# Patient Record
Sex: MALE | Race: BLACK OR AFRICAN AMERICAN | Employment: OTHER | URBAN - NONMETROPOLITAN AREA
[De-identification: names, ages, dates, MRNs, and addresses within clinical notes are randomized per-mention and may not be internally consistent; named-entity substitution may affect disease eponyms.]

---

## 2018-09-13 ENCOUNTER — HOSPITAL ENCOUNTER (OUTPATIENT)
Dept: CT IMAGING | Age: 63
Discharge: HOME | End: 2018-09-13
Payer: MEDICARE

## 2018-09-13 DIAGNOSIS — J44.9 CHRONIC OBSTRUCTIVE PULMONARY DISEASE, UNSPECIFIED COPD TYPE (HCC): ICD-10-CM

## 2018-09-13 DIAGNOSIS — R93.89 ABNORMAL CXR: ICD-10-CM

## 2018-09-13 LAB — POC CREATININE WHOLE BLOOD: 1 MG/DL (ref 0.5–1.2)

## 2018-09-13 PROCEDURE — 6360000004 HC RX CONTRAST MEDICATION: Performed by: NURSE PRACTITIONER

## 2018-09-13 PROCEDURE — 82565 ASSAY OF CREATININE: CPT

## 2018-09-13 PROCEDURE — 71260 CT THORAX DX C+: CPT

## 2018-09-13 RX ADMIN — IOPAMIDOL 85 ML: 755 INJECTION, SOLUTION INTRAVENOUS at 10:29

## 2018-12-12 ENCOUNTER — HOSPITAL ENCOUNTER (OUTPATIENT)
Dept: PULMONOLOGY | Age: 63
Discharge: HOME | End: 2018-12-12
Payer: MEDICARE

## 2018-12-12 PROCEDURE — 94729 DIFFUSING CAPACITY: CPT

## 2018-12-12 PROCEDURE — 94010 BREATHING CAPACITY TEST: CPT

## 2018-12-12 PROCEDURE — 94726 PLETHYSMOGRAPHY LUNG VOLUMES: CPT

## 2018-12-26 ENCOUNTER — HOSPITAL ENCOUNTER (OUTPATIENT)
Age: 63
Setting detail: SPECIMEN
Discharge: HOME | End: 2018-12-26
Payer: MEDICARE

## 2018-12-26 LAB — TESTOSTERONE TOTAL: 730 NG/DL (ref 220–1000)

## 2019-01-01 ENCOUNTER — TELEPHONE (OUTPATIENT)
Dept: PULMONOLOGY | Age: 64
End: 2019-01-01

## 2019-01-01 DIAGNOSIS — G47.33 OSA (OBSTRUCTIVE SLEEP APNEA): Primary | ICD-10-CM

## 2019-01-10 RX ORDER — BACLOFEN 10 MG/1
10 TABLET ORAL DAILY
COMMUNITY

## 2019-01-10 RX ORDER — PREGABALIN 75 MG/1
75 CAPSULE ORAL 2 TIMES DAILY
COMMUNITY

## 2019-01-10 RX ORDER — PIOGLITAZONEHYDROCHLORIDE 30 MG/1
30 TABLET ORAL DAILY
Status: ON HOLD | COMMUNITY
End: 2020-01-01 | Stop reason: HOSPADM

## 2019-01-10 RX ORDER — METOPROLOL TARTRATE 100 MG/1
100 TABLET ORAL 2 TIMES DAILY
Status: ON HOLD | COMMUNITY
End: 2020-01-01 | Stop reason: HOSPADM

## 2019-01-10 RX ORDER — MODAFINIL 100 MG/1
100 TABLET ORAL DAILY
COMMUNITY

## 2019-01-10 RX ORDER — BUSPIRONE HYDROCHLORIDE 5 MG/1
5 TABLET ORAL 3 TIMES DAILY
COMMUNITY
End: 2020-01-01 | Stop reason: ALTCHOICE

## 2019-01-10 RX ORDER — DUTASTERIDE 0.5 MG/1
0.5 CAPSULE, LIQUID FILLED ORAL DAILY
COMMUNITY

## 2019-01-10 RX ORDER — LOSARTAN POTASSIUM 50 MG/1
50 TABLET ORAL DAILY
Status: ON HOLD | COMMUNITY
End: 2020-01-01 | Stop reason: ALTCHOICE

## 2019-01-10 RX ORDER — HOMATROPINE HYDROBROMIDE OPHTHALMIC 50 MG/ML
1 SOLUTION OPHTHALMIC
COMMUNITY

## 2019-01-10 RX ORDER — ASPIRIN 81 MG/1
81 TABLET ORAL DAILY
COMMUNITY

## 2019-01-10 RX ORDER — ZOLPIDEM TARTRATE 10 MG/1
TABLET ORAL NIGHTLY PRN
COMMUNITY

## 2019-01-10 RX ORDER — SULFAMETHOXAZOLE AND TRIMETHOPRIM 800; 160 MG/1; MG/1
1 TABLET ORAL 2 TIMES DAILY
COMMUNITY
End: 2019-01-11

## 2019-01-10 RX ORDER — FLUTICASONE PROPIONATE 110 UG/1
1 AEROSOL, METERED RESPIRATORY (INHALATION) 2 TIMES DAILY
COMMUNITY
End: 2019-01-11

## 2019-01-10 RX ORDER — ALBUTEROL SULFATE 90 UG/1
2 AEROSOL, METERED RESPIRATORY (INHALATION) EVERY 6 HOURS PRN
COMMUNITY

## 2019-01-10 RX ORDER — METFORMIN HYDROCHLORIDE 500 MG/1
500 TABLET, EXTENDED RELEASE ORAL 2 TIMES DAILY
Status: ON HOLD | COMMUNITY
End: 2020-01-01 | Stop reason: HOSPADM

## 2019-01-10 RX ORDER — OMEPRAZOLE 20 MG/1
20 CAPSULE, DELAYED RELEASE ORAL EVERY OTHER DAY
COMMUNITY

## 2019-01-11 ENCOUNTER — OFFICE VISIT (OUTPATIENT)
Dept: PULMONOLOGY | Age: 64
End: 2019-01-11
Payer: MEDICARE

## 2019-01-11 VITALS
SYSTOLIC BLOOD PRESSURE: 136 MMHG | DIASTOLIC BLOOD PRESSURE: 76 MMHG | HEART RATE: 80 BPM | BODY MASS INDEX: 30.06 KG/M2 | OXYGEN SATURATION: 99 % | TEMPERATURE: 97.7 F | HEIGHT: 70 IN | WEIGHT: 210 LBS

## 2019-01-11 DIAGNOSIS — Z77.9 HISTORY OF INHALATIONAL EXPOSURE TO TOXIN: ICD-10-CM

## 2019-01-11 DIAGNOSIS — G47.33 OSA (OBSTRUCTIVE SLEEP APNEA): ICD-10-CM

## 2019-01-11 DIAGNOSIS — J98.4 MIXED RESTRICTIVE AND OBSTRUCTIVE LUNG DISEASE (HCC): Primary | ICD-10-CM

## 2019-01-11 DIAGNOSIS — J43.9 MIXED RESTRICTIVE AND OBSTRUCTIVE LUNG DISEASE (HCC): Primary | ICD-10-CM

## 2019-01-11 DIAGNOSIS — R94.2 DECREASED DIFFUSION CAPACITY: ICD-10-CM

## 2019-01-11 DIAGNOSIS — R06.02 SOB (SHORTNESS OF BREATH): ICD-10-CM

## 2019-01-11 PROCEDURE — G8484 FLU IMMUNIZE NO ADMIN: HCPCS | Performed by: INTERNAL MEDICINE

## 2019-01-11 PROCEDURE — 1036F TOBACCO NON-USER: CPT | Performed by: INTERNAL MEDICINE

## 2019-01-11 PROCEDURE — 3023F SPIROM DOC REV: CPT | Performed by: INTERNAL MEDICINE

## 2019-01-11 PROCEDURE — 94618 PULMONARY STRESS TESTING: CPT | Performed by: INTERNAL MEDICINE

## 2019-01-11 PROCEDURE — 3017F COLORECTAL CA SCREEN DOC REV: CPT | Performed by: INTERNAL MEDICINE

## 2019-01-11 PROCEDURE — G8926 SPIRO NO PERF OR DOC: HCPCS | Performed by: INTERNAL MEDICINE

## 2019-01-11 PROCEDURE — G8427 DOCREV CUR MEDS BY ELIG CLIN: HCPCS | Performed by: INTERNAL MEDICINE

## 2019-01-11 PROCEDURE — G8417 CALC BMI ABV UP PARAM F/U: HCPCS | Performed by: INTERNAL MEDICINE

## 2019-01-11 PROCEDURE — 99204 OFFICE O/P NEW MOD 45 MIN: CPT | Performed by: INTERNAL MEDICINE

## 2019-01-11 RX ORDER — FLUTICASONE PROPIONATE 220 UG/1
2 AEROSOL, METERED RESPIRATORY (INHALATION) 2 TIMES DAILY
Qty: 1 INHALER | Refills: 3 | Status: SHIPPED | OUTPATIENT
Start: 2019-01-11 | End: 2019-03-05 | Stop reason: ALTCHOICE

## 2019-01-11 ASSESSMENT — ENCOUNTER SYMPTOMS
GASTROINTESTINAL NEGATIVE: 1
WHEEZING: 1
SHORTNESS OF BREATH: 1
COUGH: 1

## 2019-01-17 ENCOUNTER — HOSPITAL ENCOUNTER (OUTPATIENT)
Dept: NON INVASIVE DIAGNOSTICS | Age: 64
Discharge: HOME | End: 2019-01-17
Payer: MEDICARE

## 2019-01-17 LAB
LV EF: 33 %
LVEF MODALITY: NORMAL

## 2019-01-17 PROCEDURE — 93306 TTE W/DOPPLER COMPLETE: CPT

## 2019-03-05 ENCOUNTER — TELEPHONE (OUTPATIENT)
Dept: PULMONOLOGY | Age: 64
End: 2019-03-05

## 2019-03-05 ENCOUNTER — INITIAL CONSULT (OUTPATIENT)
Dept: PULMONOLOGY | Age: 64
End: 2019-03-05
Payer: MEDICARE

## 2019-03-05 VITALS
BODY MASS INDEX: 29.84 KG/M2 | WEIGHT: 208.4 LBS | SYSTOLIC BLOOD PRESSURE: 112 MMHG | HEART RATE: 74 BPM | DIASTOLIC BLOOD PRESSURE: 64 MMHG | OXYGEN SATURATION: 99 % | HEIGHT: 70 IN

## 2019-03-05 DIAGNOSIS — J44.1 COPD EXACERBATION (HCC): Primary | ICD-10-CM

## 2019-03-05 DIAGNOSIS — G47.33 OSA (OBSTRUCTIVE SLEEP APNEA): ICD-10-CM

## 2019-03-05 PROCEDURE — 3023F SPIROM DOC REV: CPT | Performed by: INTERNAL MEDICINE

## 2019-03-05 PROCEDURE — 3017F COLORECTAL CA SCREEN DOC REV: CPT | Performed by: INTERNAL MEDICINE

## 2019-03-05 PROCEDURE — G8417 CALC BMI ABV UP PARAM F/U: HCPCS | Performed by: INTERNAL MEDICINE

## 2019-03-05 PROCEDURE — 99214 OFFICE O/P EST MOD 30 MIN: CPT | Performed by: INTERNAL MEDICINE

## 2019-03-05 PROCEDURE — G8926 SPIRO NO PERF OR DOC: HCPCS | Performed by: INTERNAL MEDICINE

## 2019-03-05 PROCEDURE — 1036F TOBACCO NON-USER: CPT | Performed by: INTERNAL MEDICINE

## 2019-03-05 PROCEDURE — G8484 FLU IMMUNIZE NO ADMIN: HCPCS | Performed by: INTERNAL MEDICINE

## 2019-03-05 PROCEDURE — G8427 DOCREV CUR MEDS BY ELIG CLIN: HCPCS | Performed by: INTERNAL MEDICINE

## 2019-03-05 RX ORDER — PREDNISONE 20 MG/1
40 TABLET ORAL DAILY
Qty: 12 TABLET | Refills: 0 | Status: SHIPPED | OUTPATIENT
Start: 2019-03-05 | End: 2019-03-11

## 2019-03-05 RX ORDER — GUAIFENESIN AND DEXTROMETHORPHAN HYDROBROMIDE 100; 10 MG/5ML; MG/5ML
5 SOLUTION ORAL EVERY 6 HOURS PRN
COMMUNITY
End: 2020-01-01 | Stop reason: ALTCHOICE

## 2019-03-19 ENCOUNTER — HOSPITAL ENCOUNTER (OUTPATIENT)
Age: 64
Setting detail: SPECIMEN
Discharge: HOME | End: 2019-03-19
Payer: MEDICARE

## 2019-03-19 LAB
SEX HORMONE BINDING GLOBULIN: 21 NMOL/L (ref 11–80)
TESTOSTERONE FREE-NONMALE: 120.9 PG/ML (ref 47–244)
TESTOSTERONE TOTAL: 462 NG/DL (ref 220–1000)

## 2019-03-21 ENCOUNTER — OFFICE VISIT (OUTPATIENT)
Dept: PULMONOLOGY | Age: 64
End: 2019-03-21
Payer: MEDICARE

## 2019-03-21 VITALS
OXYGEN SATURATION: 99 % | BODY MASS INDEX: 29.95 KG/M2 | DIASTOLIC BLOOD PRESSURE: 80 MMHG | HEIGHT: 70 IN | HEART RATE: 85 BPM | RESPIRATION RATE: 18 BRPM | WEIGHT: 209.2 LBS | SYSTOLIC BLOOD PRESSURE: 110 MMHG

## 2019-03-21 DIAGNOSIS — G47.33 OSA ON CPAP: ICD-10-CM

## 2019-03-21 DIAGNOSIS — J44.9 CHRONIC OBSTRUCTIVE PULMONARY DISEASE, UNSPECIFIED COPD TYPE (HCC): Primary | ICD-10-CM

## 2019-03-21 DIAGNOSIS — Z99.89 OSA ON CPAP: ICD-10-CM

## 2019-03-21 PROCEDURE — 3023F SPIROM DOC REV: CPT | Performed by: INTERNAL MEDICINE

## 2019-03-21 PROCEDURE — G8417 CALC BMI ABV UP PARAM F/U: HCPCS | Performed by: INTERNAL MEDICINE

## 2019-03-21 PROCEDURE — 99213 OFFICE O/P EST LOW 20 MIN: CPT | Performed by: INTERNAL MEDICINE

## 2019-03-21 PROCEDURE — 3017F COLORECTAL CA SCREEN DOC REV: CPT | Performed by: INTERNAL MEDICINE

## 2019-03-21 PROCEDURE — 1036F TOBACCO NON-USER: CPT | Performed by: INTERNAL MEDICINE

## 2019-03-21 PROCEDURE — G8427 DOCREV CUR MEDS BY ELIG CLIN: HCPCS | Performed by: INTERNAL MEDICINE

## 2019-03-21 PROCEDURE — G8926 SPIRO NO PERF OR DOC: HCPCS | Performed by: INTERNAL MEDICINE

## 2019-03-21 PROCEDURE — G8484 FLU IMMUNIZE NO ADMIN: HCPCS | Performed by: INTERNAL MEDICINE

## 2019-03-21 ASSESSMENT — ENCOUNTER SYMPTOMS
SHORTNESS OF BREATH: 1
COUGH: 1
WHEEZING: 1
GASTROINTESTINAL NEGATIVE: 1

## 2019-05-20 ENCOUNTER — HOSPITAL ENCOUNTER (OUTPATIENT)
Age: 64
Setting detail: SPECIMEN
Discharge: HOME | End: 2019-05-20
Payer: MEDICAID

## 2019-05-20 LAB
-: ABNORMAL
AMORPHOUS: ABNORMAL
BACTERIA: ABNORMAL
BILIRUBIN URINE: NEGATIVE
CASTS UA: ABNORMAL /LPF (ref 0–8)
COLOR: YELLOW
COMMENT UA: ABNORMAL
CRYSTALS, UA: ABNORMAL /HPF
EPITHELIAL CELLS UA: ABNORMAL /HPF (ref 0–5)
GLUCOSE URINE: ABNORMAL
KETONES, URINE: NEGATIVE
LEUKOCYTE ESTERASE, URINE: ABNORMAL
MUCUS: ABNORMAL
NITRITE, URINE: NEGATIVE
OTHER OBSERVATIONS UA: ABNORMAL
PH UA: 6.5 (ref 5–8)
PROTEIN UA: NEGATIVE
RBC UA: ABNORMAL /HPF (ref 0–4)
RENAL EPITHELIAL, UA: ABNORMAL /HPF
SPECIFIC GRAVITY UA: 1.02 (ref 1–1.03)
TRICHOMONAS: ABNORMAL
TURBIDITY: ABNORMAL
URINE HGB: ABNORMAL
UROBILINOGEN, URINE: NORMAL
WBC UA: ABNORMAL /HPF (ref 0–5)
YEAST: ABNORMAL

## 2019-05-22 LAB
CULTURE: ABNORMAL
Lab: ABNORMAL
SPECIMEN DESCRIPTION: ABNORMAL

## 2019-05-23 LAB
CULTURE: ABNORMAL
Lab: ABNORMAL
SPECIMEN DESCRIPTION: ABNORMAL

## 2019-06-05 ENCOUNTER — HOSPITAL ENCOUNTER (OUTPATIENT)
Age: 64
Setting detail: SPECIMEN
Discharge: HOME | End: 2019-06-05
Payer: MEDICARE

## 2019-06-05 LAB
ABSOLUTE EOS #: 0.13 K/UL (ref 0–0.44)
ABSOLUTE IMMATURE GRANULOCYTE: <0.03 K/UL (ref 0–0.3)
ABSOLUTE LYMPH #: 1.7 K/UL (ref 1.1–3.7)
ABSOLUTE MONO #: 0.34 K/UL (ref 0.1–1.2)
ALBUMIN SERPL-MCNC: 4.7 G/DL (ref 3.5–5.2)
ALBUMIN/GLOBULIN RATIO: 1.6 (ref 1–2.5)
ALP BLD-CCNC: 125 U/L (ref 40–129)
ALT SERPL-CCNC: 28 U/L (ref 5–41)
ANION GAP SERPL CALCULATED.3IONS-SCNC: 13 MMOL/L (ref 9–17)
AST SERPL-CCNC: 16 U/L
BASOPHILS # BLD: 1 % (ref 0–2)
BASOPHILS ABSOLUTE: 0.07 K/UL (ref 0–0.2)
BILIRUB SERPL-MCNC: 0.6 MG/DL (ref 0.3–1.2)
BUN BLDV-MCNC: 16 MG/DL (ref 8–23)
BUN/CREAT BLD: ABNORMAL (ref 9–20)
CALCIUM SERPL-MCNC: 9.9 MG/DL (ref 8.6–10.4)
CHLORIDE BLD-SCNC: 104 MMOL/L (ref 98–107)
CO2: 25 MMOL/L (ref 20–31)
CREAT SERPL-MCNC: 1.08 MG/DL (ref 0.7–1.2)
DIFFERENTIAL TYPE: ABNORMAL
EOSINOPHILS RELATIVE PERCENT: 2 % (ref 1–4)
GFR AFRICAN AMERICAN: >60 ML/MIN
GFR NON-AFRICAN AMERICAN: >60 ML/MIN
GFR SERPL CREATININE-BSD FRML MDRD: ABNORMAL ML/MIN/{1.73_M2}
GFR SERPL CREATININE-BSD FRML MDRD: ABNORMAL ML/MIN/{1.73_M2}
GLUCOSE BLD-MCNC: 122 MG/DL (ref 70–99)
HAV IGM SER IA-ACNC: NONREACTIVE
HCT VFR BLD CALC: 47.5 % (ref 40.7–50.3)
HEMOGLOBIN: 13.8 G/DL (ref 13–17)
HEPATITIS B CORE IGM ANTIBODY: NONREACTIVE
HEPATITIS B SURFACE ANTIGEN: NONREACTIVE
HEPATITIS C ANTIBODY: NONREACTIVE
HIV AG/AB: NONREACTIVE
IMMATURE GRANULOCYTES: 0 %
LYMPHOCYTES # BLD: 28 % (ref 24–43)
MCH RBC QN AUTO: 23 PG (ref 25.2–33.5)
MCHC RBC AUTO-ENTMCNC: 29.1 G/DL (ref 28.4–34.8)
MCV RBC AUTO: 79.2 FL (ref 82.6–102.9)
MONOCYTES # BLD: 6 % (ref 3–12)
NRBC AUTOMATED: 0 PER 100 WBC
PDW BLD-RTO: 19.7 % (ref 11.8–14.4)
PLATELET # BLD: ABNORMAL K/UL (ref 138–453)
PLATELET ESTIMATE: ABNORMAL
PLATELET, FLUORESCENCE: 183 K/UL (ref 138–453)
PLATELET, IMMATURE FRACTION: 17.8 % (ref 1.1–10.3)
PMV BLD AUTO: ABNORMAL FL (ref 8.1–13.5)
POTASSIUM SERPL-SCNC: 4.6 MMOL/L (ref 3.7–5.3)
RBC # BLD: 6 M/UL (ref 4.21–5.77)
RBC # BLD: ABNORMAL 10*6/UL
SEG NEUTROPHILS: 63 % (ref 36–65)
SEGMENTED NEUTROPHILS ABSOLUTE COUNT: 3.79 K/UL (ref 1.5–8.1)
SODIUM BLD-SCNC: 142 MMOL/L (ref 135–144)
TOTAL PROTEIN: 7.7 G/DL (ref 6.4–8.3)
WBC # BLD: 6.1 K/UL (ref 3.5–11.3)
WBC # BLD: ABNORMAL 10*3/UL

## 2019-07-11 ENCOUNTER — HOSPITAL ENCOUNTER (OUTPATIENT)
Age: 64
Setting detail: SPECIMEN
Discharge: HOME | End: 2019-07-11
Payer: MEDICARE

## 2019-07-12 ENCOUNTER — HOSPITAL ENCOUNTER (OUTPATIENT)
Age: 64
Setting detail: SPECIMEN
Discharge: HOME | End: 2019-07-12
Payer: MEDICARE

## 2019-07-12 LAB
ABSOLUTE EOS #: 0.23 K/UL (ref 0–0.44)
ABSOLUTE IMMATURE GRANULOCYTE: 0.09 K/UL (ref 0–0.3)
ABSOLUTE LYMPH #: 1.55 K/UL (ref 1.1–3.7)
ABSOLUTE MONO #: 0.61 K/UL (ref 0.1–1.2)
ALBUMIN SERPL-MCNC: 4.4 G/DL (ref 3.5–5.2)
ALBUMIN/GLOBULIN RATIO: 1.5 (ref 1–2.5)
ALP BLD-CCNC: 136 U/L (ref 40–129)
ALT SERPL-CCNC: 23 U/L (ref 5–41)
ANION GAP SERPL CALCULATED.3IONS-SCNC: 13 MMOL/L (ref 9–17)
AST SERPL-CCNC: 19 U/L
BASOPHILS # BLD: 1 % (ref 0–2)
BASOPHILS ABSOLUTE: 0.11 K/UL (ref 0–0.2)
BILIRUB SERPL-MCNC: 0.54 MG/DL (ref 0.3–1.2)
BUN BLDV-MCNC: 10 MG/DL (ref 8–23)
BUN/CREAT BLD: ABNORMAL (ref 9–20)
C. TRACHOMATIS DNA ,URINE: NEGATIVE
CALCIUM SERPL-MCNC: 9.4 MG/DL (ref 8.6–10.4)
CHLORIDE BLD-SCNC: 97 MMOL/L (ref 98–107)
CO2: 26 MMOL/L (ref 20–31)
CREAT SERPL-MCNC: 1.08 MG/DL (ref 0.7–1.2)
DIFFERENTIAL TYPE: ABNORMAL
EOSINOPHILS RELATIVE PERCENT: 2 % (ref 1–4)
FOLATE: >20 NG/ML
GFR AFRICAN AMERICAN: >60 ML/MIN
GFR NON-AFRICAN AMERICAN: >60 ML/MIN
GFR SERPL CREATININE-BSD FRML MDRD: ABNORMAL ML/MIN/{1.73_M2}
GFR SERPL CREATININE-BSD FRML MDRD: ABNORMAL ML/MIN/{1.73_M2}
GLUCOSE BLD-MCNC: 315 MG/DL (ref 70–99)
HCT VFR BLD CALC: 47 % (ref 40.7–50.3)
HEMOGLOBIN: 13.5 G/DL (ref 13–17)
IMMATURE GRANULOCYTES: 1 %
LYMPHOCYTES # BLD: 17 % (ref 24–43)
MAGNESIUM: 2.1 MG/DL (ref 1.6–2.6)
MCH RBC QN AUTO: 23.2 PG (ref 25.2–33.5)
MCHC RBC AUTO-ENTMCNC: 28.7 G/DL (ref 28.4–34.8)
MCV RBC AUTO: 80.8 FL (ref 82.6–102.9)
MONOCYTES # BLD: 7 % (ref 3–12)
N. GONORRHOEAE DNA, URINE: NEGATIVE
NRBC AUTOMATED: 0.2 PER 100 WBC
PDW BLD-RTO: 19.5 % (ref 11.8–14.4)
PLATELET # BLD: ABNORMAL K/UL (ref 138–453)
PLATELET ESTIMATE: ABNORMAL
PLATELET, FLUORESCENCE: 179 K/UL (ref 138–453)
PLATELET, IMMATURE FRACTION: NORMAL % (ref 1.1–10.3)
PMV BLD AUTO: ABNORMAL FL (ref 8.1–13.5)
POTASSIUM SERPL-SCNC: 4.6 MMOL/L (ref 3.7–5.3)
RBC # BLD: 5.82 M/UL (ref 4.21–5.77)
RBC # BLD: ABNORMAL 10*6/UL
SEG NEUTROPHILS: 72 % (ref 36–65)
SEGMENTED NEUTROPHILS ABSOLUTE COUNT: 6.82 K/UL (ref 1.5–8.1)
SODIUM BLD-SCNC: 136 MMOL/L (ref 135–144)
SOURCE: NORMAL
SPECIMEN DESCRIPTION: NORMAL
TESTOSTERONE TOTAL: 928 NG/DL (ref 220–1000)
TOTAL PROTEIN: 7.3 G/DL (ref 6.4–8.3)
TRICHOMONAS VAGINALI, MOLECULAR: NEGATIVE
TSH SERPL DL<=0.05 MIU/L-ACNC: 2.04 MIU/L (ref 0.3–5)
VITAMIN B-12: 551 PG/ML (ref 232–1245)
VITAMIN D 25-HYDROXY: 24.3 NG/ML (ref 30–100)
WBC # BLD: 9.4 K/UL (ref 3.5–11.3)
WBC # BLD: ABNORMAL 10*3/UL

## 2019-07-17 ENCOUNTER — HOSPITAL ENCOUNTER (OUTPATIENT)
Age: 64
Setting detail: SPECIMEN
Discharge: HOME | End: 2019-07-17
Payer: MEDICARE

## 2019-07-17 LAB
-: ABNORMAL
AMORPHOUS: ABNORMAL
BACTERIA: ABNORMAL
BILIRUBIN URINE: NEGATIVE
CASTS UA: ABNORMAL /LPF (ref 0–8)
COLOR: YELLOW
COMMENT UA: ABNORMAL
CRYSTALS, UA: ABNORMAL /HPF
EPITHELIAL CELLS UA: ABNORMAL /HPF (ref 0–5)
GLUCOSE URINE: ABNORMAL
KETONES, URINE: NEGATIVE
LEUKOCYTE ESTERASE, URINE: ABNORMAL
MUCUS: ABNORMAL
NITRITE, URINE: NEGATIVE
OTHER OBSERVATIONS UA: ABNORMAL
PH UA: 5.5 (ref 5–8)
PROTEIN UA: NEGATIVE
RBC UA: ABNORMAL /HPF (ref 0–4)
RENAL EPITHELIAL, UA: ABNORMAL /HPF
SPECIFIC GRAVITY UA: 1.01 (ref 1–1.03)
TRICHOMONAS: ABNORMAL
TURBIDITY: ABNORMAL
URINE HGB: ABNORMAL
UROBILINOGEN, URINE: NORMAL
WBC UA: ABNORMAL /HPF (ref 0–5)
YEAST: ABNORMAL

## 2019-07-19 LAB
CULTURE: ABNORMAL
Lab: ABNORMAL
SPECIMEN DESCRIPTION: ABNORMAL

## 2019-07-22 ENCOUNTER — HOSPITAL ENCOUNTER (OUTPATIENT)
Age: 64
Setting detail: SPECIMEN
Discharge: HOME | End: 2019-07-22
Payer: MEDICARE

## 2019-07-22 LAB
ABSOLUTE RETIC #: 0.17 M/UL (ref 0.03–0.08)
ALBUMIN SERPL-MCNC: 4.7 G/DL (ref 3.5–5.2)
ALBUMIN/GLOBULIN RATIO: 1.6 (ref 1–2.5)
ALP BLD-CCNC: 129 U/L (ref 40–129)
ALT SERPL-CCNC: 25 U/L (ref 5–41)
ANION GAP SERPL CALCULATED.3IONS-SCNC: 14 MMOL/L (ref 9–17)
AST SERPL-CCNC: 23 U/L
BILIRUB SERPL-MCNC: 0.58 MG/DL (ref 0.3–1.2)
BUN BLDV-MCNC: 11 MG/DL (ref 8–23)
BUN/CREAT BLD: ABNORMAL (ref 9–20)
CALCIUM SERPL-MCNC: 9.8 MG/DL (ref 8.6–10.4)
CHLORIDE BLD-SCNC: 99 MMOL/L (ref 98–107)
CO2: 25 MMOL/L (ref 20–31)
CREAT SERPL-MCNC: 1.18 MG/DL (ref 0.7–1.2)
FERRITIN: 29 UG/L (ref 30–400)
GFR AFRICAN AMERICAN: >60 ML/MIN
GFR NON-AFRICAN AMERICAN: >60 ML/MIN
GFR SERPL CREATININE-BSD FRML MDRD: ABNORMAL ML/MIN/{1.73_M2}
GFR SERPL CREATININE-BSD FRML MDRD: ABNORMAL ML/MIN/{1.73_M2}
GLUCOSE BLD-MCNC: 280 MG/DL (ref 70–99)
IMMATURE RETIC FRACT: 29.5 % (ref 2.7–18.3)
IRON SATURATION: 11 % (ref 20–55)
IRON: 50 UG/DL (ref 59–158)
POTASSIUM SERPL-SCNC: 4.7 MMOL/L (ref 3.7–5.3)
RETIC %: 2.9 % (ref 0.5–1.9)
RETIC HEMOGLOBIN: 23 PG (ref 28.2–35.7)
SODIUM BLD-SCNC: 138 MMOL/L (ref 135–144)
TOTAL IRON BINDING CAPACITY: 438 UG/DL (ref 250–450)
TOTAL PROTEIN: 7.7 G/DL (ref 6.4–8.3)
TSH SERPL DL<=0.05 MIU/L-ACNC: 1.43 MIU/L (ref 0.3–5)
UNSATURATED IRON BINDING CAPACITY: 388 UG/DL (ref 112–347)

## 2019-08-02 ENCOUNTER — HOSPITAL ENCOUNTER (OUTPATIENT)
Age: 64
Setting detail: SPECIMEN
Discharge: HOME | End: 2019-08-02
Payer: MEDICARE

## 2019-08-02 LAB
HCT VFR BLD CALC: 48.2 % (ref 40.7–50.3)
HEMOGLOBIN: 13.7 G/DL (ref 13–17)
MCH RBC QN AUTO: 22.6 PG (ref 25.2–33.5)
MCHC RBC AUTO-ENTMCNC: 28.4 G/DL (ref 28.4–34.8)
MCV RBC AUTO: 79.4 FL (ref 82.6–102.9)
NRBC AUTOMATED: 0 PER 100 WBC
PDW BLD-RTO: 19.1 % (ref 11.8–14.4)
PLATELET # BLD: ABNORMAL K/UL (ref 138–453)
PLATELET, FLUORESCENCE: 127 K/UL (ref 138–453)
PLATELET, IMMATURE FRACTION: 16.2 % (ref 1.1–10.3)
PMV BLD AUTO: ABNORMAL FL (ref 8.1–13.5)
PROSTATE SPECIFIC ANTIGEN: 0.45 UG/L
RBC # BLD: 6.07 M/UL (ref 4.21–5.77)
SEX HORMONE BINDING GLOBULIN: 21 NMOL/L (ref 11–80)
TESTOSTERONE FREE-NONMALE: 290.7 PG/ML (ref 47–244)
TESTOSTERONE TOTAL: 985 NG/DL (ref 220–1000)
WBC # BLD: 7.4 K/UL (ref 3.5–11.3)

## 2019-09-06 ENCOUNTER — HOSPITAL ENCOUNTER (OUTPATIENT)
Age: 64
Setting detail: SPECIMEN
Discharge: HOME | End: 2019-09-06
Payer: MEDICARE

## 2019-09-06 LAB
ABSOLUTE EOS #: 0.14 K/UL (ref 0–0.44)
ABSOLUTE IMMATURE GRANULOCYTE: 0.03 K/UL (ref 0–0.3)
ABSOLUTE LYMPH #: 1.65 K/UL (ref 1.1–3.7)
ABSOLUTE MONO #: 0.54 K/UL (ref 0.1–1.2)
BASOPHILS # BLD: 1 % (ref 0–2)
BASOPHILS ABSOLUTE: 0.09 K/UL (ref 0–0.2)
DIFFERENTIAL TYPE: ABNORMAL
EOSINOPHILS RELATIVE PERCENT: 2 % (ref 1–4)
FERRITIN: 95 UG/L (ref 30–400)
HCT VFR BLD CALC: 53.5 % (ref 40.7–50.3)
HEMOGLOBIN: 15.4 G/DL (ref 13–17)
IMMATURE GRANULOCYTES: 0 %
IRON SATURATION: 18 % (ref 20–55)
IRON: 75 UG/DL (ref 59–158)
LYMPHOCYTES # BLD: 24 % (ref 24–43)
MCH RBC QN AUTO: 23.3 PG (ref 25.2–33.5)
MCHC RBC AUTO-ENTMCNC: 28.8 G/DL (ref 28.4–34.8)
MCV RBC AUTO: 81.1 FL (ref 82.6–102.9)
MONOCYTES # BLD: 8 % (ref 3–12)
NRBC AUTOMATED: 0 PER 100 WBC
PDW BLD-RTO: 19.5 % (ref 11.8–14.4)
PLATELET # BLD: ABNORMAL K/UL (ref 138–453)
PLATELET ESTIMATE: ABNORMAL
PLATELET, FLUORESCENCE: 149 K/UL (ref 138–453)
PLATELET, IMMATURE FRACTION: 19.4 % (ref 1.1–10.3)
PMV BLD AUTO: ABNORMAL FL (ref 8.1–13.5)
RBC # BLD: 6.6 M/UL (ref 4.21–5.77)
RBC # BLD: ABNORMAL 10*6/UL
SEG NEUTROPHILS: 64 % (ref 36–65)
SEGMENTED NEUTROPHILS ABSOLUTE COUNT: 4.4 K/UL (ref 1.5–8.1)
TOTAL IRON BINDING CAPACITY: 416 UG/DL (ref 250–450)
UNSATURATED IRON BINDING CAPACITY: 341 UG/DL (ref 112–347)
WBC # BLD: 6.9 K/UL (ref 3.5–11.3)
WBC # BLD: ABNORMAL 10*3/UL

## 2019-09-09 ENCOUNTER — HOSPITAL ENCOUNTER (OUTPATIENT)
Age: 64
Setting detail: SPECIMEN
Discharge: HOME | End: 2019-09-09
Payer: MEDICARE

## 2019-09-10 LAB
HGB ELECTROPHORESIS INTERP: NORMAL
PATHOLOGIST: NORMAL

## 2019-09-26 ENCOUNTER — OFFICE VISIT (OUTPATIENT)
Dept: PULMONOLOGY | Age: 64
End: 2019-09-26
Payer: MEDICARE

## 2019-09-26 ENCOUNTER — HOSPITAL ENCOUNTER (OUTPATIENT)
Dept: INTERVENTIONAL RADIOLOGY/VASCULAR | Age: 64
Discharge: HOME | End: 2019-09-26
Payer: MEDICARE

## 2019-09-26 VITALS
OXYGEN SATURATION: 98 % | DIASTOLIC BLOOD PRESSURE: 84 MMHG | WEIGHT: 226.8 LBS | TEMPERATURE: 97.7 F | HEIGHT: 70 IN | BODY MASS INDEX: 32.47 KG/M2 | SYSTOLIC BLOOD PRESSURE: 136 MMHG | HEART RATE: 82 BPM

## 2019-09-26 DIAGNOSIS — G47.33 OSA ON CPAP: ICD-10-CM

## 2019-09-26 DIAGNOSIS — Z99.89 OSA ON CPAP: ICD-10-CM

## 2019-09-26 DIAGNOSIS — R42 DIZZINESS AND GIDDINESS: ICD-10-CM

## 2019-09-26 DIAGNOSIS — J43.9 MIXED RESTRICTIVE AND OBSTRUCTIVE LUNG DISEASE (HCC): Primary | ICD-10-CM

## 2019-09-26 DIAGNOSIS — J98.4 MIXED RESTRICTIVE AND OBSTRUCTIVE LUNG DISEASE (HCC): Primary | ICD-10-CM

## 2019-09-26 PROCEDURE — 1036F TOBACCO NON-USER: CPT | Performed by: NURSE PRACTITIONER

## 2019-09-26 PROCEDURE — 93880 EXTRACRANIAL BILAT STUDY: CPT

## 2019-09-26 PROCEDURE — G8427 DOCREV CUR MEDS BY ELIG CLIN: HCPCS | Performed by: NURSE PRACTITIONER

## 2019-09-26 PROCEDURE — 3017F COLORECTAL CA SCREEN DOC REV: CPT | Performed by: NURSE PRACTITIONER

## 2019-09-26 PROCEDURE — 99213 OFFICE O/P EST LOW 20 MIN: CPT | Performed by: NURSE PRACTITIONER

## 2019-09-26 PROCEDURE — 4040F PNEUMOC VAC/ADMIN/RCVD: CPT | Performed by: NURSE PRACTITIONER

## 2019-09-26 PROCEDURE — G8926 SPIRO NO PERF OR DOC: HCPCS | Performed by: NURSE PRACTITIONER

## 2019-09-26 PROCEDURE — 3023F SPIROM DOC REV: CPT | Performed by: NURSE PRACTITIONER

## 2019-09-26 PROCEDURE — G8417 CALC BMI ABV UP PARAM F/U: HCPCS | Performed by: NURSE PRACTITIONER

## 2019-09-26 PROCEDURE — 1123F ACP DISCUSS/DSCN MKR DOCD: CPT | Performed by: NURSE PRACTITIONER

## 2019-09-26 ASSESSMENT — ENCOUNTER SYMPTOMS
BACK PAIN: 0
STRIDOR: 0
NAUSEA: 0
WHEEZING: 0
COUGH: 0
DIARRHEA: 0
SHORTNESS OF BREATH: 1
CHEST TIGHTNESS: 0
ALLERGIC/IMMUNOLOGIC NEGATIVE: 1
EYES NEGATIVE: 1

## 2019-09-26 NOTE — PROGRESS NOTES
tablet Take 10 mg by mouth 3 times daily      dutasteride (AVODART) 0.5 MG capsule Take 0.5 mg by mouth daily      Handicap Placard MISC by Does not apply route      homatropine 5 % ophthalmic solution 1 drop every hour       losartan (COZAAR) 50 MG tablet Take 50 mg by mouth daily      pregabalin (LYRICA) 75 MG capsule Take 75 mg by mouth 2 times daily. [place]is [place] metFORMIN (GLUCOPHAGE-XR) 500 MG extended release tablet Take 500 mg by mouth daily (with breakfast)      metoprolol (LOPRESSOR) 100 MG tablet Take 100 mg by mouth 2 times daily      modafinil (PROVIGIL) 100 MG tablet Take 100 mg by mouth daily. [place]is [place] omeprazole (PRILOSEC) 20 MG delayed release capsule Take 20 mg by mouth 4 times daily      pioglitazone (ACTOS) 30 MG tablet Take 30 mg by mouth daily      Respiratory Therapy Supplies (NEBULIZER/ADULT MASK) KIT by Does not apply route      Testosterone (TESTOPEL) 75 MG PLLT by Implant route. [place]is [place] insulin glargine (TOUJEO SOLOSTAR) 300 UNIT/ML injection pen Inject into the skin nightly      blood glucose test strips (TRUE METRIX BLOOD GLUCOSE TEST) strip 1 each by In Vitro route daily As needed.  albuterol sulfate  (90 Base) MCG/ACT inhaler Inhale 2 puffs into the lungs every 6 hours as needed for Wheezing      Liraglutide (VICTOZA) 18 MG/3ML SOPN SC injection Inject 1.2 mg into the skin daily      Dextromethorphan-guaiFENesin  MG/5ML SYRP Take 5 mLs by mouth every 6 hours as needed for Cough      zolpidem (AMBIEN) 10 MG tablet Take by mouth nightly as needed for Sleep. [place]is [place] aspirin 81 MG EC tablet Take 81 mg by mouth daily      busPIRone (BUSPAR) 5 MG tablet Take 5 mg by mouth 3 times daily        No current facility-administered medications for this visit. Demetrius GOMES   Review of Systems   Constitutional: Negative for activity change, appetite change, chills and fever. HENT: Negative for congestion and postnasal drip. Eyes: Negative.     Respiratory: Positive for

## 2019-10-17 ENCOUNTER — HOSPITAL ENCOUNTER (OUTPATIENT)
Age: 64
Setting detail: SPECIMEN
Discharge: HOME | End: 2019-10-17
Payer: MEDICARE

## 2019-10-17 LAB
ABSOLUTE EOS #: 0.12 K/UL (ref 0–0.44)
ABSOLUTE IMMATURE GRANULOCYTE: 0.08 K/UL (ref 0–0.3)
ABSOLUTE LYMPH #: 1.66 K/UL (ref 1.1–3.7)
ABSOLUTE MONO #: 0.55 K/UL (ref 0.1–1.2)
ALBUMIN SERPL-MCNC: 4.4 G/DL (ref 3.5–5.2)
ALBUMIN/GLOBULIN RATIO: 1.5 (ref 1–2.5)
ALP BLD-CCNC: 125 U/L (ref 40–129)
ALT SERPL-CCNC: 34 U/L (ref 5–41)
ANION GAP SERPL CALCULATED.3IONS-SCNC: 13 MMOL/L (ref 9–17)
AST SERPL-CCNC: 30 U/L
BASOPHILS # BLD: 1 % (ref 0–2)
BASOPHILS ABSOLUTE: 0.07 K/UL (ref 0–0.2)
BILIRUB SERPL-MCNC: 1.07 MG/DL (ref 0.3–1.2)
BUN BLDV-MCNC: 11 MG/DL (ref 8–23)
BUN/CREAT BLD: ABNORMAL (ref 9–20)
CALCIUM SERPL-MCNC: 9.4 MG/DL (ref 8.6–10.4)
CHLORIDE BLD-SCNC: 101 MMOL/L (ref 98–107)
CO2: 27 MMOL/L (ref 20–31)
CREAT SERPL-MCNC: 1 MG/DL (ref 0.7–1.2)
DIFFERENTIAL TYPE: ABNORMAL
EOSINOPHILS RELATIVE PERCENT: 2 % (ref 1–4)
GFR AFRICAN AMERICAN: >60 ML/MIN
GFR NON-AFRICAN AMERICAN: >60 ML/MIN
GFR SERPL CREATININE-BSD FRML MDRD: ABNORMAL ML/MIN/{1.73_M2}
GFR SERPL CREATININE-BSD FRML MDRD: ABNORMAL ML/MIN/{1.73_M2}
GLUCOSE BLD-MCNC: 235 MG/DL (ref 70–99)
HCT VFR BLD CALC: 48.8 % (ref 40.7–50.3)
HEMOGLOBIN: 14.4 G/DL (ref 13–17)
IMMATURE GRANULOCYTES: 1 %
IRON SATURATION: 20 % (ref 20–55)
IRON: 73 UG/DL (ref 59–158)
LYMPHOCYTES # BLD: 23 % (ref 24–43)
MCH RBC QN AUTO: 23.6 PG (ref 25.2–33.5)
MCHC RBC AUTO-ENTMCNC: 29.5 G/DL (ref 28.4–34.8)
MCV RBC AUTO: 79.9 FL (ref 82.6–102.9)
MONOCYTES # BLD: 8 % (ref 3–12)
NRBC AUTOMATED: 0.3 PER 100 WBC
PDW BLD-RTO: 19.7 % (ref 11.8–14.4)
PLATELET # BLD: ABNORMAL K/UL (ref 138–453)
PLATELET ESTIMATE: ABNORMAL
PLATELET, FLUORESCENCE: 155 K/UL (ref 138–453)
PLATELET, IMMATURE FRACTION: 17.8 % (ref 1.1–10.3)
PMV BLD AUTO: ABNORMAL FL (ref 8.1–13.5)
POTASSIUM SERPL-SCNC: 4.3 MMOL/L (ref 3.7–5.3)
RBC # BLD: 6.11 M/UL (ref 4.21–5.77)
RBC # BLD: ABNORMAL 10*6/UL
SEG NEUTROPHILS: 66 % (ref 36–65)
SEGMENTED NEUTROPHILS ABSOLUTE COUNT: 4.88 K/UL (ref 1.5–8.1)
SODIUM BLD-SCNC: 141 MMOL/L (ref 135–144)
TOTAL IRON BINDING CAPACITY: 364 UG/DL (ref 250–450)
TOTAL PROTEIN: 7.4 G/DL (ref 6.4–8.3)
TSH SERPL DL<=0.05 MIU/L-ACNC: 1.51 MIU/L (ref 0.3–5)
UNSATURATED IRON BINDING CAPACITY: 291 UG/DL (ref 112–347)
WBC # BLD: 7.4 K/UL (ref 3.5–11.3)
WBC # BLD: ABNORMAL 10*3/UL

## 2020-01-01 ENCOUNTER — APPOINTMENT (OUTPATIENT)
Dept: CT IMAGING | Age: 65
DRG: 871 | End: 2020-01-01
Payer: MEDICARE

## 2020-01-01 ENCOUNTER — APPOINTMENT (OUTPATIENT)
Dept: GENERAL RADIOLOGY | Age: 65
DRG: 177 | End: 2020-01-01
Payer: MEDICARE

## 2020-01-01 ENCOUNTER — TELEPHONE (OUTPATIENT)
Dept: INTERNAL MEDICINE CLINIC | Age: 65
End: 2020-01-01

## 2020-01-01 ENCOUNTER — TELEPHONE (OUTPATIENT)
Dept: CARDIOLOGY CLINIC | Age: 65
End: 2020-01-01

## 2020-01-01 ENCOUNTER — APPOINTMENT (OUTPATIENT)
Dept: GENERAL RADIOLOGY | Age: 65
DRG: 871 | End: 2020-01-01
Payer: MEDICARE

## 2020-01-01 ENCOUNTER — HOSPITAL ENCOUNTER (OUTPATIENT)
Age: 65
Setting detail: SPECIMEN
Discharge: HOME | End: 2020-03-09
Payer: MEDICARE

## 2020-01-01 ENCOUNTER — HOSPITAL ENCOUNTER (OUTPATIENT)
Age: 65
Setting detail: SPECIMEN
Discharge: HOME | End: 2020-07-15
Payer: MEDICARE

## 2020-01-01 ENCOUNTER — HOSPITAL ENCOUNTER (INPATIENT)
Age: 65
LOS: 10 days | Discharge: HOME | DRG: 871 | End: 2020-10-13
Attending: EMERGENCY MEDICINE | Admitting: INTERNAL MEDICINE
Payer: MEDICARE

## 2020-01-01 ENCOUNTER — OFFICE VISIT (OUTPATIENT)
Dept: PULMONOLOGY | Age: 65
End: 2020-01-01
Payer: MEDICARE

## 2020-01-01 ENCOUNTER — PREP FOR PROCEDURE (OUTPATIENT)
Dept: CARDIOLOGY CLINIC | Age: 65
End: 2020-01-01

## 2020-01-01 ENCOUNTER — TELEPHONE (OUTPATIENT)
Dept: UROLOGY | Age: 65
End: 2020-01-01

## 2020-01-01 ENCOUNTER — HOSPITAL ENCOUNTER (OUTPATIENT)
Age: 65
Setting detail: SPECIMEN
Discharge: HOME | End: 2020-10-19
Payer: MEDICARE

## 2020-01-01 ENCOUNTER — PREP FOR PROCEDURE (OUTPATIENT)
Dept: CARDIOLOGY | Age: 65
End: 2020-01-01

## 2020-01-01 ENCOUNTER — HOSPITAL ENCOUNTER (OUTPATIENT)
Dept: INPATIENT UNIT | Age: 65
Discharge: HOME | End: 2020-01-29
Attending: NUCLEAR MEDICINE | Admitting: NUCLEAR MEDICINE
Payer: MEDICARE

## 2020-01-01 ENCOUNTER — OFFICE VISIT (OUTPATIENT)
Dept: CARDIOLOGY CLINIC | Age: 65
End: 2020-01-01
Payer: MEDICARE

## 2020-01-01 ENCOUNTER — HOSPITAL ENCOUNTER (INPATIENT)
Age: 65
LOS: 6 days | DRG: 177 | End: 2020-10-27
Attending: EMERGENCY MEDICINE | Admitting: NURSE PRACTITIONER
Payer: MEDICARE

## 2020-01-01 ENCOUNTER — HOSPITAL ENCOUNTER (OUTPATIENT)
Dept: PULMONOLOGY | Age: 65
Discharge: HOME | End: 2020-02-24
Payer: MEDICARE

## 2020-01-01 ENCOUNTER — APPOINTMENT (OUTPATIENT)
Dept: ULTRASOUND IMAGING | Age: 65
DRG: 177 | End: 2020-01-01
Payer: MEDICARE

## 2020-01-01 ENCOUNTER — HOSPITAL ENCOUNTER (OUTPATIENT)
Dept: INPATIENT UNIT | Age: 65
Discharge: HOME | End: 2020-01-20

## 2020-01-01 ENCOUNTER — HOSPITAL ENCOUNTER (OUTPATIENT)
Age: 65
Setting detail: SPECIMEN
Discharge: HOME | End: 2020-08-19
Payer: MEDICARE

## 2020-01-01 ENCOUNTER — HOSPITAL ENCOUNTER (OUTPATIENT)
Dept: NON INVASIVE DIAGNOSTICS | Age: 65
Discharge: HOME | End: 2020-02-24
Payer: MEDICARE

## 2020-01-01 ENCOUNTER — HOSPITAL ENCOUNTER (OUTPATIENT)
Age: 65
Setting detail: SPECIMEN
Discharge: HOME | End: 2020-01-16
Payer: MEDICARE

## 2020-01-01 ENCOUNTER — VIRTUAL VISIT (OUTPATIENT)
Dept: UROLOGY | Age: 65
End: 2020-01-01

## 2020-01-01 ENCOUNTER — TELEPHONE (OUTPATIENT)
Dept: PULMONOLOGY | Age: 65
End: 2020-01-01

## 2020-01-01 VITALS
OXYGEN SATURATION: 97 % | SYSTOLIC BLOOD PRESSURE: 119 MMHG | TEMPERATURE: 98 F | RESPIRATION RATE: 13 BRPM | BODY MASS INDEX: 32.78 KG/M2 | HEIGHT: 70 IN | WEIGHT: 229 LBS | HEART RATE: 77 BPM | DIASTOLIC BLOOD PRESSURE: 68 MMHG

## 2020-01-01 VITALS
HEIGHT: 70 IN | DIASTOLIC BLOOD PRESSURE: 78 MMHG | BODY MASS INDEX: 30.18 KG/M2 | HEART RATE: 88 BPM | OXYGEN SATURATION: 97 % | RESPIRATION RATE: 24 BRPM | WEIGHT: 210.8 LBS | TEMPERATURE: 98.6 F | SYSTOLIC BLOOD PRESSURE: 198 MMHG

## 2020-01-01 VITALS
HEIGHT: 70 IN | BODY MASS INDEX: 32.78 KG/M2 | WEIGHT: 229 LBS | RESPIRATION RATE: 16 BRPM | HEART RATE: 80 BPM | SYSTOLIC BLOOD PRESSURE: 150 MMHG | TEMPERATURE: 98 F | OXYGEN SATURATION: 100 % | DIASTOLIC BLOOD PRESSURE: 92 MMHG

## 2020-01-01 VITALS
HEIGHT: 70 IN | DIASTOLIC BLOOD PRESSURE: 82 MMHG | SYSTOLIC BLOOD PRESSURE: 138 MMHG | BODY MASS INDEX: 31.12 KG/M2 | HEART RATE: 91 BPM | WEIGHT: 217.4 LBS

## 2020-01-01 VITALS
DIASTOLIC BLOOD PRESSURE: 78 MMHG | HEART RATE: 84 BPM | HEIGHT: 70 IN | BODY MASS INDEX: 32.47 KG/M2 | SYSTOLIC BLOOD PRESSURE: 130 MMHG | WEIGHT: 226.8 LBS | OXYGEN SATURATION: 98 % | TEMPERATURE: 98.7 F

## 2020-01-01 VITALS
HEIGHT: 67 IN | TEMPERATURE: 98 F | WEIGHT: 221 LBS | OXYGEN SATURATION: 95 % | SYSTOLIC BLOOD PRESSURE: 121 MMHG | RESPIRATION RATE: 12 BRPM | HEART RATE: 64 BPM | BODY MASS INDEX: 34.69 KG/M2 | DIASTOLIC BLOOD PRESSURE: 73 MMHG

## 2020-01-01 VITALS — HEIGHT: 70 IN | WEIGHT: 217 LBS | BODY MASS INDEX: 31.07 KG/M2

## 2020-01-01 LAB
-: NORMAL
ABO: NORMAL
ABO: NORMAL
ABSOLUTE EOS #: 0.14 K/UL (ref 0–0.44)
ABSOLUTE EOS #: 0.16 K/UL (ref 0–0.44)
ABSOLUTE EOS #: <0.03 K/UL (ref 0–0.44)
ABSOLUTE IMMATURE GRANULOCYTE: 0.03 K/UL (ref 0–0.3)
ABSOLUTE IMMATURE GRANULOCYTE: 0.06 K/UL (ref 0–0.3)
ABSOLUTE IMMATURE GRANULOCYTE: 0.14 K/UL (ref 0–0.3)
ABSOLUTE LYMPH #: 1.27 K/UL (ref 1.1–3.7)
ABSOLUTE LYMPH #: 1.64 K/UL (ref 1.1–3.7)
ABSOLUTE LYMPH #: 1.74 K/UL (ref 1.1–3.7)
ABSOLUTE MONO #: 0.48 K/UL (ref 0.1–1.2)
ABSOLUTE MONO #: 0.59 K/UL (ref 0.1–1.2)
ABSOLUTE MONO #: 0.97 K/UL (ref 0.1–1.2)
ACINETOBACTER CALCOACETICUS-BAUMANNII BY PCR: NOT DETECTED
ACINETOBACTER CALCOACETICUS-BAUMANNII BY PCR: NOT DETECTED
ADENOVIRUS BY PCR: NOT DETECTED
ADENOVIRUS BY PCR: NOT DETECTED
ALBUMIN SERPL-MCNC: 3.1 G/DL (ref 3.5–5.1)
ALBUMIN SERPL-MCNC: 3.3 G/DL (ref 3.5–5.1)
ALBUMIN SERPL-MCNC: 3.4 G/DL (ref 3.5–5.1)
ALBUMIN SERPL-MCNC: 3.4 G/DL (ref 3.5–5.1)
ALBUMIN SERPL-MCNC: 3.6 G/DL (ref 3.5–5.1)
ALBUMIN SERPL-MCNC: 3.6 G/DL (ref 3.5–5.1)
ALBUMIN SERPL-MCNC: 3.8 G/DL (ref 3.5–5.1)
ALBUMIN SERPL-MCNC: 3.9 G/DL (ref 3.5–5.1)
ALLEN TEST: POSITIVE
ALP BLD-CCNC: 111 U/L (ref 38–126)
ALP BLD-CCNC: 115 U/L (ref 38–126)
ALP BLD-CCNC: 116 U/L (ref 38–126)
ALP BLD-CCNC: 126 U/L (ref 38–126)
ALP BLD-CCNC: 127 U/L (ref 38–126)
ALP BLD-CCNC: 129 U/L (ref 38–126)
ALP BLD-CCNC: 149 U/L (ref 38–126)
ALP BLD-CCNC: 177 U/L (ref 38–126)
ALT SERPL-CCNC: 29 U/L (ref 11–66)
ALT SERPL-CCNC: 35 U/L (ref 11–66)
ALT SERPL-CCNC: 37 U/L (ref 11–66)
ALT SERPL-CCNC: 39 U/L (ref 11–66)
ALT SERPL-CCNC: 40 U/L (ref 11–66)
ALT SERPL-CCNC: 40 U/L (ref 11–66)
ALT SERPL-CCNC: 42 U/L (ref 11–66)
ALT SERPL-CCNC: 45 U/L (ref 11–66)
AMORPHOUS: NORMAL
ANION GAP SERPL CALCULATED.3IONS-SCNC: 10 MEQ/L (ref 8–16)
ANION GAP SERPL CALCULATED.3IONS-SCNC: 11 MEQ/L (ref 8–16)
ANION GAP SERPL CALCULATED.3IONS-SCNC: 12 MEQ/L (ref 8–16)
ANION GAP SERPL CALCULATED.3IONS-SCNC: 12 MEQ/L (ref 8–16)
ANION GAP SERPL CALCULATED.3IONS-SCNC: 13 MEQ/L (ref 8–16)
ANION GAP SERPL CALCULATED.3IONS-SCNC: 13 MEQ/L (ref 8–16)
ANION GAP SERPL CALCULATED.3IONS-SCNC: 14 MEQ/L (ref 8–16)
ANION GAP SERPL CALCULATED.3IONS-SCNC: 14 MEQ/L (ref 8–16)
ANION GAP SERPL CALCULATED.3IONS-SCNC: 15 MEQ/L (ref 8–16)
ANION GAP SERPL CALCULATED.3IONS-SCNC: 16 MEQ/L (ref 8–16)
ANION GAP SERPL CALCULATED.3IONS-SCNC: 16 MMOL/L (ref 9–17)
ANION GAP SERPL CALCULATED.3IONS-SCNC: 17 MEQ/L (ref 8–16)
ANION GAP SERPL CALCULATED.3IONS-SCNC: 18 MEQ/L (ref 8–16)
ANION GAP SERPL CALCULATED.3IONS-SCNC: 8 MEQ/L (ref 8–16)
ANION GAP SERPL CALCULATED.3IONS-SCNC: 9 MEQ/L (ref 8–16)
ANISOCYTOSIS: PRESENT
ANTIBODY SCREEN: NORMAL
ANTIBODY SCREEN: NORMAL
APTT: 23.4 SECONDS (ref 22–38)
APTT: 26.9 SECONDS (ref 22–38)
APTT: 28.2 SECONDS (ref 22–38)
APTT: 29.6 SECONDS (ref 22–38)
APTT: 32.1 SECONDS (ref 22–38)
APTT: 32.9 SECONDS (ref 22–38)
APTT: 32.9 SECONDS (ref 22–38)
AST SERPL-CCNC: 28 U/L (ref 5–40)
AST SERPL-CCNC: 63 U/L (ref 5–40)
AST SERPL-CCNC: 64 U/L (ref 5–40)
AST SERPL-CCNC: 76 U/L (ref 5–40)
AST SERPL-CCNC: 77 U/L (ref 5–40)
AST SERPL-CCNC: 77 U/L (ref 5–40)
AST SERPL-CCNC: 78 U/L (ref 5–40)
AST SERPL-CCNC: 85 U/L (ref 5–40)
BACTERIA: ABNORMAL
BACTERIA: ABNORMAL /HPF
BACTERIA: NORMAL
BASE EXCESS (CALCULATED): -0.1 MMOL/L (ref -2.5–2.5)
BASE EXCESS (CALCULATED): -1.3 MMOL/L (ref -2.5–2.5)
BASE EXCESS (CALCULATED): -3.3 MMOL/L (ref -2.5–2.5)
BASE EXCESS (CALCULATED): 0.1 MMOL/L (ref -2.5–2.5)
BASE EXCESS (CALCULATED): 2.1 MMOL/L (ref -2.5–2.5)
BASE EXCESS MIXED: -6.4 MMOL/L (ref -2–3)
BASOPHILIA: ABNORMAL
BASOPHILS # BLD: 0.2 %
BASOPHILS # BLD: 0.3 %
BASOPHILS # BLD: 0.5 %
BASOPHILS # BLD: 0.6 %
BASOPHILS # BLD: 0.7 %
BASOPHILS # BLD: 1 % (ref 0–2)
BASOPHILS ABSOLUTE: 0 THOU/MM3 (ref 0–0.1)
BASOPHILS ABSOLUTE: 0.09 K/UL (ref 0–0.2)
BASOPHILS ABSOLUTE: 0.1 THOU/MM3 (ref 0–0.1)
BETA-HYDROXYBUTYRATE: 20.14 MG/DL (ref 0.2–2.81)
BILIRUB SERPL-MCNC: 0.9 MG/DL (ref 0.3–1.2)
BILIRUB SERPL-MCNC: 1.1 MG/DL (ref 0.3–1.2)
BILIRUB SERPL-MCNC: 1.2 MG/DL (ref 0.3–1.2)
BILIRUB SERPL-MCNC: 1.4 MG/DL (ref 0.3–1.2)
BILIRUBIN DIRECT: 0.3 MG/DL (ref 0–0.3)
BILIRUBIN URINE: NEGATIVE
BLOOD CULTURE, ROUTINE: NORMAL
BLOOD, URINE: ABNORMAL
BLOOD, URINE: NEGATIVE
BUN BLDV-MCNC: 11 MG/DL (ref 8–23)
BUN BLDV-MCNC: 16 MG/DL (ref 7–22)
BUN BLDV-MCNC: 16 MG/DL (ref 7–22)
BUN BLDV-MCNC: 18 MG/DL (ref 7–22)
BUN BLDV-MCNC: 19 MG/DL (ref 7–22)
BUN BLDV-MCNC: 19 MG/DL (ref 7–22)
BUN BLDV-MCNC: 21 MG/DL (ref 7–22)
BUN BLDV-MCNC: 22 MG/DL (ref 7–22)
BUN BLDV-MCNC: 23 MG/DL (ref 7–22)
BUN BLDV-MCNC: 23 MG/DL (ref 7–22)
BUN BLDV-MCNC: 25 MG/DL (ref 7–22)
BUN BLDV-MCNC: 28 MG/DL (ref 7–22)
BUN BLDV-MCNC: 29 MG/DL (ref 7–22)
BUN BLDV-MCNC: 31 MG/DL (ref 7–22)
BUN BLDV-MCNC: 36 MG/DL (ref 7–22)
BUN BLDV-MCNC: 41 MG/DL (ref 7–22)
BUN BLDV-MCNC: 42 MG/DL (ref 7–22)
BUN BLDV-MCNC: 44 MG/DL (ref 7–22)
BUN BLDV-MCNC: 46 MG/DL (ref 7–22)
BUN BLDV-MCNC: 50 MG/DL (ref 7–22)
BUN BLDV-MCNC: 9 MG/DL (ref 7–22)
BUN/CREAT BLD: ABNORMAL (ref 9–20)
C DIFF TOXIN/ANTIGEN: NEGATIVE
CALCIUM IONIZED SERUM: 1.02 MMOL/L (ref 1.12–1.32)
CALCIUM IONIZED: 1.08 MMOL/L (ref 1.12–1.32)
CALCIUM SERPL-MCNC: 10.1 MG/DL (ref 8.5–10.5)
CALCIUM SERPL-MCNC: 10.1 MG/DL (ref 8.5–10.5)
CALCIUM SERPL-MCNC: 8.3 MG/DL (ref 8.5–10.5)
CALCIUM SERPL-MCNC: 8.3 MG/DL (ref 8.5–10.5)
CALCIUM SERPL-MCNC: 8.4 MG/DL (ref 8.5–10.5)
CALCIUM SERPL-MCNC: 8.5 MG/DL (ref 8.5–10.5)
CALCIUM SERPL-MCNC: 8.6 MG/DL (ref 8.5–10.5)
CALCIUM SERPL-MCNC: 8.7 MG/DL (ref 8.6–10.4)
CALCIUM SERPL-MCNC: 8.9 MG/DL (ref 8.5–10.5)
CALCIUM SERPL-MCNC: 9 MG/DL (ref 8.5–10.5)
CALCIUM SERPL-MCNC: 9 MG/DL (ref 8.5–10.5)
CALCIUM SERPL-MCNC: 9.1 MG/DL (ref 8.5–10.5)
CALCIUM SERPL-MCNC: 9.1 MG/DL (ref 8.5–10.5)
CALCIUM SERPL-MCNC: 9.2 MG/DL (ref 8.5–10.5)
CALCIUM SERPL-MCNC: 9.5 MG/DL (ref 8.5–10.5)
CALCIUM SERPL-MCNC: 9.5 MG/DL (ref 8.5–10.5)
CALCIUM SERPL-MCNC: 9.6 MG/DL (ref 8.5–10.5)
CASTS 2: ABNORMAL /LPF
CASTS 2: ABNORMAL /LPF
CASTS UA: ABNORMAL /LPF
CASTS UA: NORMAL /LPF (ref 0–8)
CASTS: ABNORMAL /LPF
CASTS: ABNORMAL /LPF
CHARACTER, URINE: ABNORMAL
CHARACTER, URINE: ABNORMAL
CHARACTER, URINE: CLEAR
CHARACTER, URINE: CLEAR
CHLAMYDIA PNEUMONIAE BY PCR: NOT DETECTED
CHLAMYDIA PNEUMONIAE BY PCR: NOT DETECTED
CHLORIDE BLD-SCNC: 100 MEQ/L (ref 98–111)
CHLORIDE BLD-SCNC: 101 MEQ/L (ref 98–111)
CHLORIDE BLD-SCNC: 101 MEQ/L (ref 98–111)
CHLORIDE BLD-SCNC: 101 MMOL/L (ref 98–107)
CHLORIDE BLD-SCNC: 102 MEQ/L (ref 98–111)
CHLORIDE BLD-SCNC: 102 MEQ/L (ref 98–111)
CHLORIDE BLD-SCNC: 104 MEQ/L (ref 98–111)
CHLORIDE BLD-SCNC: 104 MEQ/L (ref 98–111)
CHLORIDE BLD-SCNC: 105 MEQ/L (ref 98–111)
CHLORIDE BLD-SCNC: 105 MEQ/L (ref 98–111)
CHLORIDE BLD-SCNC: 106 MEQ/L (ref 98–111)
CHLORIDE BLD-SCNC: 108 MEQ/L (ref 98–111)
CHLORIDE BLD-SCNC: 108 MEQ/L (ref 98–111)
CHLORIDE BLD-SCNC: 110 MEQ/L (ref 98–111)
CHLORIDE BLD-SCNC: 93 MEQ/L (ref 98–111)
CHLORIDE BLD-SCNC: 95 MEQ/L (ref 98–111)
CHLORIDE BLD-SCNC: 98 MEQ/L (ref 98–111)
CHLORIDE BLD-SCNC: 99 MEQ/L (ref 98–111)
CHLORIDE, WHOLE BLOOD: 104 MEQ/L (ref 98–109)
CHOLESTEROL/HDL RATIO: 5.5
CHOLESTEROL: 199 MG/DL
CO2: 17 MEQ/L (ref 23–33)
CO2: 19 MEQ/L (ref 23–33)
CO2: 21 MEQ/L (ref 23–33)
CO2: 22 MEQ/L (ref 23–33)
CO2: 22 MEQ/L (ref 23–33)
CO2: 23 MEQ/L (ref 23–33)
CO2: 23 MMOL/L (ref 20–31)
CO2: 24 MEQ/L (ref 23–33)
CO2: 25 MEQ/L (ref 23–33)
CO2: 26 MEQ/L (ref 23–33)
CO2: 27 MEQ/L (ref 23–33)
CO2: 27 MEQ/L (ref 23–33)
CO2: 28 MEQ/L (ref 23–33)
CO2: 30 MEQ/L (ref 23–33)
COLLECTED BY:: ABNORMAL
COLOR: ABNORMAL
COLOR: YELLOW
COMMENT UA: ABNORMAL
CORONAVIRUS PCR: NOT DETECTED
CORONAVIRUS PCR: NOT DETECTED
CREAT SERPL-MCNC: 1 MG/DL (ref 0.4–1.2)
CREAT SERPL-MCNC: 1 MG/DL (ref 0.4–1.2)
CREAT SERPL-MCNC: 1.2 MG/DL (ref 0.4–1.2)
CREAT SERPL-MCNC: 1.3 MG/DL (ref 0.4–1.2)
CREAT SERPL-MCNC: 1.4 MG/DL (ref 0.4–1.2)
CREAT SERPL-MCNC: 1.4 MG/DL (ref 0.4–1.2)
CREAT SERPL-MCNC: 1.41 MG/DL (ref 0.7–1.2)
CREAT SERPL-MCNC: 1.5 MG/DL (ref 0.4–1.2)
CREAT SERPL-MCNC: 1.5 MG/DL (ref 0.4–1.2)
CREAT SERPL-MCNC: 1.6 MG/DL (ref 0.4–1.2)
CREAT SERPL-MCNC: 1.7 MG/DL (ref 0.4–1.2)
CREATININE URINE: 67.1 MG/DL
CRYSTALS, UA: ABNORMAL
CRYSTALS, UA: NORMAL /HPF
CRYSTALS: ABNORMAL
D-DIMER QUANTITATIVE: 1568 NG/ML FEU (ref 0–500)
D-DIMER QUANTITATIVE: 2244 NG/ML FEU (ref 0–500)
D-DIMER QUANTITATIVE: 2390 NG/ML FEU (ref 0–500)
D-DIMER QUANTITATIVE: 3719 NG/ML FEU (ref 0–500)
D-DIMER QUANTITATIVE: 7220 NG/ML FEU (ref 0–500)
D-DIMER QUANTITATIVE: 940 NG/ML FEU (ref 0–500)
D-DIMER QUANTITATIVE: ABNORMAL NG/ML FEU (ref 0–500)
DEVICE: ABNORMAL
DIFFERENTIAL TYPE: ABNORMAL
EKG ATRIAL RATE: 104 BPM
EKG ATRIAL RATE: 107 BPM
EKG ATRIAL RATE: 109 BPM
EKG ATRIAL RATE: 121 BPM
EKG ATRIAL RATE: 139 BPM
EKG ATRIAL RATE: 71 BPM
EKG ATRIAL RATE: 84 BPM
EKG P AXIS: 105 DEGREES
EKG P AXIS: 54 DEGREES
EKG P AXIS: 59 DEGREES
EKG P AXIS: 61 DEGREES
EKG P AXIS: 62 DEGREES
EKG P AXIS: 62 DEGREES
EKG P AXIS: 69 DEGREES
EKG P-R INTERVAL: 138 MS
EKG P-R INTERVAL: 150 MS
EKG P-R INTERVAL: 154 MS
EKG P-R INTERVAL: 156 MS
EKG P-R INTERVAL: 158 MS
EKG P-R INTERVAL: 158 MS
EKG P-R INTERVAL: 182 MS
EKG Q-T INTERVAL: 290 MS
EKG Q-T INTERVAL: 308 MS
EKG Q-T INTERVAL: 320 MS
EKG Q-T INTERVAL: 356 MS
EKG Q-T INTERVAL: 358 MS
EKG Q-T INTERVAL: 368 MS
EKG Q-T INTERVAL: 420 MS
EKG QRS DURATION: 112 MS
EKG QRS DURATION: 86 MS
EKG QRS DURATION: 88 MS
EKG QRS DURATION: 90 MS
EKG QRS DURATION: 94 MS
EKG QRS DURATION: 96 MS
EKG QRS DURATION: 98 MS
EKG QTC CALCULATION (BAZETT): 399 MS
EKG QTC CALCULATION (BAZETT): 430 MS
EKG QTC CALCULATION (BAZETT): 437 MS
EKG QTC CALCULATION (BAZETT): 441 MS
EKG QTC CALCULATION (BAZETT): 470 MS
EKG QTC CALCULATION (BAZETT): 475 MS
EKG QTC CALCULATION (BAZETT): 496 MS
EKG R AXIS: -27 DEGREES
EKG R AXIS: -34 DEGREES
EKG R AXIS: -34 DEGREES
EKG R AXIS: -39 DEGREES
EKG R AXIS: -42 DEGREES
EKG R AXIS: -46 DEGREES
EKG R AXIS: -90 DEGREES
EKG T AXIS: 100 DEGREES
EKG T AXIS: 124 DEGREES
EKG T AXIS: 50 DEGREES
EKG T AXIS: 52 DEGREES
EKG T AXIS: 64 DEGREES
EKG T AXIS: 90 DEGREES
EKG T AXIS: 95 DEGREES
EKG VENTRICULAR RATE: 104 BPM
EKG VENTRICULAR RATE: 107 BPM
EKG VENTRICULAR RATE: 109 BPM
EKG VENTRICULAR RATE: 121 BPM
EKG VENTRICULAR RATE: 139 BPM
EKG VENTRICULAR RATE: 71 BPM
EKG VENTRICULAR RATE: 84 BPM
ENTEROBACTER CLOACAE COMPLEX BY PCR: NOT DETECTED
ENTEROBACTER CLOACAE COMPLEX BY PCR: NOT DETECTED
EOSINOPHIL # BLD: 0 %
EOSINOPHIL # BLD: 0.1 %
EOSINOPHIL # BLD: 0.5 %
EOSINOPHIL # BLD: 0.8 %
EOSINOPHILS ABSOLUTE: 0 THOU/MM3 (ref 0–0.4)
EOSINOPHILS ABSOLUTE: 0.1 THOU/MM3 (ref 0–0.4)
EOSINOPHILS ABSOLUTE: 0.1 THOU/MM3 (ref 0–0.4)
EOSINOPHILS RELATIVE PERCENT: 0 % (ref 1–4)
EOSINOPHILS RELATIVE PERCENT: 2 % (ref 1–4)
EOSINOPHILS RELATIVE PERCENT: 2 % (ref 1–4)
EPITHELIAL CELLS UA: NORMAL /HPF (ref 0–5)
EPITHELIAL CELLS, UA: ABNORMAL /HPF
ERYTHROCYTE [DISTWIDTH] IN BLOOD BY AUTOMATED COUNT: 17 % (ref 11.5–14.5)
ERYTHROCYTE [DISTWIDTH] IN BLOOD BY AUTOMATED COUNT: 17 % (ref 11.5–14.5)
ERYTHROCYTE [DISTWIDTH] IN BLOOD BY AUTOMATED COUNT: 17.4 % (ref 11.5–14.5)
ERYTHROCYTE [DISTWIDTH] IN BLOOD BY AUTOMATED COUNT: 17.5 % (ref 11.5–14.5)
ERYTHROCYTE [DISTWIDTH] IN BLOOD BY AUTOMATED COUNT: 17.6 % (ref 11.5–14.5)
ERYTHROCYTE [DISTWIDTH] IN BLOOD BY AUTOMATED COUNT: 17.7 % (ref 11.5–14.5)
ERYTHROCYTE [DISTWIDTH] IN BLOOD BY AUTOMATED COUNT: 17.8 % (ref 11.5–14.5)
ERYTHROCYTE [DISTWIDTH] IN BLOOD BY AUTOMATED COUNT: 17.9 % (ref 11.5–14.5)
ERYTHROCYTE [DISTWIDTH] IN BLOOD BY AUTOMATED COUNT: 18.1 % (ref 11.5–14.5)
ERYTHROCYTE [DISTWIDTH] IN BLOOD BY AUTOMATED COUNT: 18.1 % (ref 11.5–14.5)
ERYTHROCYTE [DISTWIDTH] IN BLOOD BY AUTOMATED COUNT: 18.2 % (ref 11.5–14.5)
ERYTHROCYTE [DISTWIDTH] IN BLOOD BY AUTOMATED COUNT: 18.3 % (ref 11.5–14.5)
ERYTHROCYTE [DISTWIDTH] IN BLOOD BY AUTOMATED COUNT: 18.3 % (ref 11.5–14.5)
ERYTHROCYTE [DISTWIDTH] IN BLOOD BY AUTOMATED COUNT: 18.5 % (ref 11.5–14.5)
ERYTHROCYTE [DISTWIDTH] IN BLOOD BY AUTOMATED COUNT: 18.7 % (ref 11.5–14.5)
ERYTHROCYTE [DISTWIDTH] IN BLOOD BY AUTOMATED COUNT: 19.6 % (ref 11.5–14.5)
ERYTHROCYTE [DISTWIDTH] IN BLOOD BY AUTOMATED COUNT: 47.5 FL (ref 35–45)
ERYTHROCYTE [DISTWIDTH] IN BLOOD BY AUTOMATED COUNT: 48.1 FL (ref 35–45)
ERYTHROCYTE [DISTWIDTH] IN BLOOD BY AUTOMATED COUNT: 49.7 FL (ref 35–45)
ERYTHROCYTE [DISTWIDTH] IN BLOOD BY AUTOMATED COUNT: 49.8 FL (ref 35–45)
ERYTHROCYTE [DISTWIDTH] IN BLOOD BY AUTOMATED COUNT: 50 FL (ref 35–45)
ERYTHROCYTE [DISTWIDTH] IN BLOOD BY AUTOMATED COUNT: 50.3 FL (ref 35–45)
ERYTHROCYTE [DISTWIDTH] IN BLOOD BY AUTOMATED COUNT: 50.5 FL (ref 35–45)
ERYTHROCYTE [DISTWIDTH] IN BLOOD BY AUTOMATED COUNT: 51 FL (ref 35–45)
ERYTHROCYTE [DISTWIDTH] IN BLOOD BY AUTOMATED COUNT: 51 FL (ref 35–45)
ERYTHROCYTE [DISTWIDTH] IN BLOOD BY AUTOMATED COUNT: 51.1 FL (ref 35–45)
ERYTHROCYTE [DISTWIDTH] IN BLOOD BY AUTOMATED COUNT: 51.1 FL (ref 35–45)
ERYTHROCYTE [DISTWIDTH] IN BLOOD BY AUTOMATED COUNT: 51.3 FL (ref 35–45)
ERYTHROCYTE [DISTWIDTH] IN BLOOD BY AUTOMATED COUNT: 51.6 FL (ref 35–45)
ERYTHROCYTE [DISTWIDTH] IN BLOOD BY AUTOMATED COUNT: 51.6 FL (ref 35–45)
ERYTHROCYTE [DISTWIDTH] IN BLOOD BY AUTOMATED COUNT: 52.1 FL (ref 35–45)
ERYTHROCYTE [DISTWIDTH] IN BLOOD BY AUTOMATED COUNT: 52.2 FL (ref 35–45)
ERYTHROCYTE [DISTWIDTH] IN BLOOD BY AUTOMATED COUNT: 52.3 FL (ref 35–45)
ERYTHROCYTE [DISTWIDTH] IN BLOOD BY AUTOMATED COUNT: 52.8 FL (ref 35–45)
ESCHERICHIA COLI BY PCR: NOT DETECTED
ESCHERICHIA COLI BY PCR: NOT DETECTED
ESTIMATED AVERAGE GLUCOSE: 203 MG/DL
FERRITIN: 29 UG/L (ref 30–400)
FERRITIN: 2985 NG/ML (ref 22–322)
FERRITIN: 837 UG/L (ref 30–400)
FERRITIN: 857 UG/L (ref 30–400)
FIBRINOGEN: 416 MG/100ML (ref 155–475)
FIBRINOGEN: 532 MG/100ML (ref 155–475)
FIBRINOGEN: 547 MG/100ML (ref 155–475)
FIBRINOGEN: 562 MG/100ML (ref 155–475)
FIBRINOGEN: 616 MG/100ML (ref 155–475)
FIBRINOGEN: 636 MG/100ML (ref 155–475)
GFR AFRICAN AMERICAN: >60 ML/MIN
GFR NON-AFRICAN AMERICAN: 50 ML/MIN
GFR SERPL CREATININE-BSD FRML MDRD: 49 ML/MIN/1.73M2
GFR SERPL CREATININE-BSD FRML MDRD: 53 ML/MIN/1.73M2
GFR SERPL CREATININE-BSD FRML MDRD: 57 ML/MIN/1.73M2
GFR SERPL CREATININE-BSD FRML MDRD: 57 ML/MIN/1.73M2
GFR SERPL CREATININE-BSD FRML MDRD: 61 ML/MIN/1.73M2
GFR SERPL CREATININE-BSD FRML MDRD: 61 ML/MIN/1.73M2
GFR SERPL CREATININE-BSD FRML MDRD: 67 ML/MIN/1.73M2
GFR SERPL CREATININE-BSD FRML MDRD: 73 ML/MIN/1.73M2
GFR SERPL CREATININE-BSD FRML MDRD: > 90 ML/MIN/1.73M2
GFR SERPL CREATININE-BSD FRML MDRD: > 90 ML/MIN/1.73M2
GFR SERPL CREATININE-BSD FRML MDRD: ABNORMAL ML/MIN/{1.73_M2}
GFR SERPL CREATININE-BSD FRML MDRD: ABNORMAL ML/MIN/{1.73_M2}
GLUCOSE BLD-MCNC: 112 MG/DL (ref 70–108)
GLUCOSE BLD-MCNC: 113 MG/DL (ref 70–108)
GLUCOSE BLD-MCNC: 122 MG/DL (ref 70–108)
GLUCOSE BLD-MCNC: 123 MG/DL (ref 70–108)
GLUCOSE BLD-MCNC: 123 MG/DL (ref 70–108)
GLUCOSE BLD-MCNC: 126 MG/DL (ref 70–108)
GLUCOSE BLD-MCNC: 129 MG/DL (ref 70–108)
GLUCOSE BLD-MCNC: 132 MG/DL (ref 70–108)
GLUCOSE BLD-MCNC: 134 MG/DL (ref 70–108)
GLUCOSE BLD-MCNC: 141 MG/DL (ref 70–108)
GLUCOSE BLD-MCNC: 142 MG/DL (ref 70–108)
GLUCOSE BLD-MCNC: 146 MG/DL (ref 70–108)
GLUCOSE BLD-MCNC: 146 MG/DL (ref 70–108)
GLUCOSE BLD-MCNC: 149 MG/DL (ref 70–108)
GLUCOSE BLD-MCNC: 149 MG/DL (ref 70–108)
GLUCOSE BLD-MCNC: 150 MG/DL (ref 70–108)
GLUCOSE BLD-MCNC: 151 MG/DL (ref 70–108)
GLUCOSE BLD-MCNC: 153 MG/DL (ref 70–108)
GLUCOSE BLD-MCNC: 154 MG/DL (ref 70–108)
GLUCOSE BLD-MCNC: 154 MG/DL (ref 70–108)
GLUCOSE BLD-MCNC: 156 MG/DL (ref 70–108)
GLUCOSE BLD-MCNC: 157 MG/DL (ref 70–108)
GLUCOSE BLD-MCNC: 159 MG/DL (ref 70–108)
GLUCOSE BLD-MCNC: 159 MG/DL (ref 70–108)
GLUCOSE BLD-MCNC: 160 MG/DL (ref 70–108)
GLUCOSE BLD-MCNC: 162 MG/DL (ref 70–108)
GLUCOSE BLD-MCNC: 163 MG/DL (ref 70–108)
GLUCOSE BLD-MCNC: 168 MG/DL (ref 70–108)
GLUCOSE BLD-MCNC: 168 MG/DL (ref 70–108)
GLUCOSE BLD-MCNC: 170 MG/DL (ref 70–108)
GLUCOSE BLD-MCNC: 170 MG/DL (ref 70–108)
GLUCOSE BLD-MCNC: 176 MG/DL (ref 70–99)
GLUCOSE BLD-MCNC: 178 MG/DL (ref 70–108)
GLUCOSE BLD-MCNC: 178 MG/DL (ref 70–108)
GLUCOSE BLD-MCNC: 182 MG/DL (ref 70–108)
GLUCOSE BLD-MCNC: 187 MG/DL (ref 70–108)
GLUCOSE BLD-MCNC: 188 MG/DL (ref 70–108)
GLUCOSE BLD-MCNC: 190 MG/DL (ref 70–108)
GLUCOSE BLD-MCNC: 192 MG/DL (ref 70–108)
GLUCOSE BLD-MCNC: 193 MG/DL (ref 70–108)
GLUCOSE BLD-MCNC: 195 MG/DL (ref 70–108)
GLUCOSE BLD-MCNC: 195 MG/DL (ref 70–108)
GLUCOSE BLD-MCNC: 199 MG/DL (ref 70–108)
GLUCOSE BLD-MCNC: 200 MG/DL (ref 70–108)
GLUCOSE BLD-MCNC: 201 MG/DL (ref 70–108)
GLUCOSE BLD-MCNC: 209 MG/DL (ref 70–108)
GLUCOSE BLD-MCNC: 214 MG/DL (ref 70–108)
GLUCOSE BLD-MCNC: 215 MG/DL (ref 70–108)
GLUCOSE BLD-MCNC: 215 MG/DL (ref 70–108)
GLUCOSE BLD-MCNC: 219 MG/DL (ref 70–108)
GLUCOSE BLD-MCNC: 227 MG/DL (ref 70–108)
GLUCOSE BLD-MCNC: 229 MG/DL (ref 70–108)
GLUCOSE BLD-MCNC: 231 MG/DL (ref 70–108)
GLUCOSE BLD-MCNC: 235 MG/DL (ref 70–108)
GLUCOSE BLD-MCNC: 240 MG/DL (ref 70–108)
GLUCOSE BLD-MCNC: 243 MG/DL (ref 70–108)
GLUCOSE BLD-MCNC: 249 MG/DL (ref 70–108)
GLUCOSE BLD-MCNC: 249 MG/DL (ref 70–108)
GLUCOSE BLD-MCNC: 252 MG/DL (ref 70–108)
GLUCOSE BLD-MCNC: 254 MG/DL (ref 70–108)
GLUCOSE BLD-MCNC: 256 MG/DL (ref 70–108)
GLUCOSE BLD-MCNC: 257 MG/DL (ref 70–108)
GLUCOSE BLD-MCNC: 258 MG/DL (ref 70–108)
GLUCOSE BLD-MCNC: 263 MG/DL (ref 70–108)
GLUCOSE BLD-MCNC: 264 MG/DL (ref 70–108)
GLUCOSE BLD-MCNC: 266 MG/DL (ref 70–108)
GLUCOSE BLD-MCNC: 268 MG/DL (ref 70–108)
GLUCOSE BLD-MCNC: 271 MG/DL (ref 70–108)
GLUCOSE BLD-MCNC: 272 MG/DL (ref 70–108)
GLUCOSE BLD-MCNC: 285 MG/DL (ref 70–108)
GLUCOSE BLD-MCNC: 288 MG/DL (ref 70–108)
GLUCOSE BLD-MCNC: 289 MG/DL (ref 70–108)
GLUCOSE BLD-MCNC: 290 MG/DL (ref 70–108)
GLUCOSE BLD-MCNC: 292 MG/DL (ref 70–108)
GLUCOSE BLD-MCNC: 293 MG/DL (ref 70–108)
GLUCOSE BLD-MCNC: 297 MG/DL (ref 70–108)
GLUCOSE BLD-MCNC: 300 MG/DL (ref 70–108)
GLUCOSE BLD-MCNC: 315 MG/DL (ref 70–108)
GLUCOSE BLD-MCNC: 322 MG/DL (ref 70–108)
GLUCOSE BLD-MCNC: 324 MG/DL (ref 70–108)
GLUCOSE BLD-MCNC: 329 MG/DL (ref 70–108)
GLUCOSE BLD-MCNC: 335 MG/DL (ref 70–108)
GLUCOSE BLD-MCNC: 336 MG/DL (ref 70–108)
GLUCOSE BLD-MCNC: 340 MG/DL (ref 70–108)
GLUCOSE BLD-MCNC: 349 MG/DL (ref 70–108)
GLUCOSE BLD-MCNC: 370 MG/DL (ref 70–108)
GLUCOSE BLD-MCNC: 385 MG/DL (ref 70–108)
GLUCOSE BLD-MCNC: 392 MG/DL (ref 70–108)
GLUCOSE BLD-MCNC: 394 MG/DL (ref 70–108)
GLUCOSE BLD-MCNC: 445 MG/DL (ref 70–108)
GLUCOSE BLD-MCNC: 47 MG/DL (ref 70–108)
GLUCOSE BLD-MCNC: 51 MG/DL (ref 70–108)
GLUCOSE BLD-MCNC: 86 MG/DL (ref 70–108)
GLUCOSE BLD-MCNC: 86 MG/DL (ref 70–108)
GLUCOSE BLD-MCNC: 96 MG/DL (ref 70–108)
GLUCOSE URINE: >= 1000 MG/DL
GLUCOSE URINE: >= 1000 MG/DL
GLUCOSE URINE: NEGATIVE
GLUCOSE URINE: NEGATIVE MG/DL
GLUCOSE, URINE: NEGATIVE MG/DL
GLUCOSE, WHOLE BLOOD: 270 MG/DL (ref 70–108)
GRAM STAIN RESULT: ABNORMAL
GRAM STAIN RESULT: NORMAL
HAEMOPHILUS INFLUENZAE BY PCR: NOT DETECTED
HAEMOPHILUS INFLUENZAE BY PCR: NOT DETECTED
HBA1C MFR BLD: 8.7 % (ref 4–6)
HCO3, MIXED: 24 MMOL/L (ref 23–28)
HCO3: 23 MMOL/L (ref 23–28)
HCO3: 24 MMOL/L (ref 23–28)
HCO3: 27 MMOL/L (ref 23–28)
HCO3: 30 MMOL/L (ref 23–28)
HCO3: 31 MMOL/L (ref 23–28)
HCT VFR BLD CALC: 44.5 % (ref 42–52)
HCT VFR BLD CALC: 47 % (ref 42–52)
HCT VFR BLD CALC: 48.6 % (ref 40.7–50.3)
HCT VFR BLD CALC: 48.9 % (ref 42–52)
HCT VFR BLD CALC: 48.9 % (ref 42–52)
HCT VFR BLD CALC: 49.2 % (ref 42–52)
HCT VFR BLD CALC: 49.4 % (ref 42–52)
HCT VFR BLD CALC: 49.7 % (ref 40.7–50.3)
HCT VFR BLD CALC: 50 % (ref 42–52)
HCT VFR BLD CALC: 50.1 % (ref 42–52)
HCT VFR BLD CALC: 50.4 % (ref 42–52)
HCT VFR BLD CALC: 50.7 % (ref 42–52)
HCT VFR BLD CALC: 51.3 % (ref 42–52)
HCT VFR BLD CALC: 51.8 % (ref 42–52)
HCT VFR BLD CALC: 52.6 % (ref 42–52)
HCT VFR BLD CALC: 53 % (ref 42–52)
HCT VFR BLD CALC: 54.8 % (ref 42–52)
HCT VFR BLD CALC: 55.7 % (ref 40.7–50.3)
HCT VFR BLD CALC: 56.4 % (ref 42–52)
HCT VFR BLD CALC: 56.4 % (ref 42–52)
HCT VFR BLD CALC: 56.9 % (ref 42–52)
HDLC SERPL-MCNC: 36 MG/DL
HEMOGLOBIN: 14.1 GM/DL (ref 14–18)
HEMOGLOBIN: 14.2 G/DL (ref 13–17)
HEMOGLOBIN: 14.4 GM/DL (ref 14–18)
HEMOGLOBIN: 14.9 G/DL (ref 13–17)
HEMOGLOBIN: 15 GM/DL (ref 14–18)
HEMOGLOBIN: 15.1 GM/DL (ref 14–18)
HEMOGLOBIN: 15.1 GM/DL (ref 14–18)
HEMOGLOBIN: 15.2 GM/DL (ref 14–18)
HEMOGLOBIN: 15.4 GM/DL (ref 14–18)
HEMOGLOBIN: 15.4 GM/DL (ref 14–18)
HEMOGLOBIN: 15.5 GM/DL (ref 14–18)
HEMOGLOBIN: 15.7 GM/DL (ref 14–18)
HEMOGLOBIN: 15.9 GM/DL (ref 14–18)
HEMOGLOBIN: 16.1 GM/DL (ref 14–18)
HEMOGLOBIN: 16.3 GM/DL (ref 14–18)
HEMOGLOBIN: 16.4 G/DL (ref 13–17)
HEMOGLOBIN: 16.5 GM/DL (ref 14–18)
HEMOGLOBIN: 17.1 GM/DL (ref 14–18)
HEMOGLOBIN: 17.5 GM/DL (ref 14–18)
HEMOGLOBIN: 17.8 GM/DL (ref 14–18)
HEMOGLOBIN: 18 GM/DL (ref 14–18)
IFIO2: 100
IFIO2: 30
IFIO2: 6
IMMATURE GRANS (ABS): 0.04 THOU/MM3 (ref 0–0.07)
IMMATURE GRANS (ABS): 0.04 THOU/MM3 (ref 0–0.07)
IMMATURE GRANS (ABS): 0.05 THOU/MM3 (ref 0–0.07)
IMMATURE GRANS (ABS): 0.06 THOU/MM3 (ref 0–0.07)
IMMATURE GRANS (ABS): 0.07 THOU/MM3 (ref 0–0.07)
IMMATURE GRANS (ABS): 0.07 THOU/MM3 (ref 0–0.07)
IMMATURE GRANS (ABS): 0.08 THOU/MM3 (ref 0–0.07)
IMMATURE GRANS (ABS): 0.1 THOU/MM3 (ref 0–0.07)
IMMATURE GRANS (ABS): 0.1 THOU/MM3 (ref 0–0.07)
IMMATURE GRANS (ABS): 0.11 THOU/MM3 (ref 0–0.07)
IMMATURE GRANS (ABS): 0.22 THOU/MM3 (ref 0–0.07)
IMMATURE GRANULOCYTES: 0 %
IMMATURE GRANULOCYTES: 0.4 %
IMMATURE GRANULOCYTES: 0.5 %
IMMATURE GRANULOCYTES: 0.6 %
IMMATURE GRANULOCYTES: 0.7 %
IMMATURE GRANULOCYTES: 0.7 %
IMMATURE GRANULOCYTES: 0.8 %
IMMATURE GRANULOCYTES: 0.9 %
IMMATURE GRANULOCYTES: 1 %
IMMATURE GRANULOCYTES: 1 %
IMMATURE GRANULOCYTES: 1.8 %
IMMATURE GRANULOCYTES: 2 %
INFLUENZA A BY PCR: NOT DETECTED
INFLUENZA A BY PCR: NOT DETECTED
INFLUENZA B BY PCR: NOT DETECTED
INFLUENZA B BY PCR: NOT DETECTED
INR BLD: 0.86 (ref 0.85–1.13)
INR BLD: 0.94 (ref 0.85–1.13)
INR BLD: 0.95 (ref 0.85–1.13)
INR BLD: 0.97 (ref 0.85–1.13)
INR BLD: 0.99 (ref 0.85–1.13)
INR BLD: 1 (ref 0.85–1.13)
INR BLD: 1.05 (ref 0.85–1.13)
IRON SATURATION: 11 % (ref 20–55)
IRON SATURATION: 15 % (ref 20–55)
IRON: 39 UG/DL (ref 59–158)
IRON: 47 UG/DL (ref 59–158)
IRON: 92 UG/DL (ref 59–158)
KETONES, URINE: 15
KETONES, URINE: NEGATIVE
KLEBSIELLA AEROGENES BY PCR: NOT DETECTED
KLEBSIELLA AEROGENES BY PCR: NOT DETECTED
KLEBSIELLA OXYTOCA BY PCR: NOT DETECTED
KLEBSIELLA OXYTOCA BY PCR: NOT DETECTED
KLEBSIELLA PNEUMONIAE GROUP BY PCR: NOT DETECTED
KLEBSIELLA PNEUMONIAE GROUP BY PCR: NOT DETECTED
LACTIC ACID: 1.3 MMOL/L (ref 0.5–2.2)
LACTIC ACID: 1.5 MMOL/L (ref 0.5–2.2)
LACTIC ACID: 1.7 MMOL/L (ref 0.5–2.2)
LD: 830 U/L (ref 100–190)
LDL CHOLESTEROL DIRECT: 96 MG/DL
LDL CHOLESTEROL: ABNORMAL MG/DL (ref 0–130)
LEGIONELLA PNEUMOPHILIA AG, URINE: NEGATIVE
LEGIONELLA PNEUMOPHILIA AG, URINE: NEGATIVE
LEGIONELLA PNEUMOPHILIA BY PCR: NOT DETECTED
LEGIONELLA PNEUMOPHILIA BY PCR: NOT DETECTED
LEUKOCYTE EST, POC: ABNORMAL
LEUKOCYTE ESTERASE, URINE: ABNORMAL
LEUKOCYTE ESTERASE, URINE: NEGATIVE
LV EF: 38 %
LVEF MODALITY: NORMAL
LYMPHOCYTES # BLD: 12.1 %
LYMPHOCYTES # BLD: 12.4 %
LYMPHOCYTES # BLD: 13.8 %
LYMPHOCYTES # BLD: 14 %
LYMPHOCYTES # BLD: 15 % (ref 24–43)
LYMPHOCYTES # BLD: 21 % (ref 24–43)
LYMPHOCYTES # BLD: 23 % (ref 24–43)
LYMPHOCYTES # BLD: 3.8 %
LYMPHOCYTES # BLD: 4.7 %
LYMPHOCYTES # BLD: 5.3 %
LYMPHOCYTES # BLD: 6.2 %
LYMPHOCYTES # BLD: 8.1 %
LYMPHOCYTES # BLD: 8.2 %
LYMPHOCYTES # BLD: 9.4 %
LYMPHOCYTES ABSOLUTE: 0.4 THOU/MM3 (ref 1–4.8)
LYMPHOCYTES ABSOLUTE: 0.6 THOU/MM3 (ref 1–4.8)
LYMPHOCYTES ABSOLUTE: 0.6 THOU/MM3 (ref 1–4.8)
LYMPHOCYTES ABSOLUTE: 0.7 THOU/MM3 (ref 1–4.8)
LYMPHOCYTES ABSOLUTE: 0.8 THOU/MM3 (ref 1–4.8)
LYMPHOCYTES ABSOLUTE: 0.9 THOU/MM3 (ref 1–4.8)
LYMPHOCYTES ABSOLUTE: 1.1 THOU/MM3 (ref 1–4.8)
LYMPHOCYTES ABSOLUTE: 1.2 THOU/MM3 (ref 1–4.8)
LYMPHOCYTES ABSOLUTE: 1.3 THOU/MM3 (ref 1–4.8)
LYMPHOCYTES ABSOLUTE: 1.4 THOU/MM3 (ref 1–4.8)
LYMPHOCYTES ABSOLUTE: 1.8 THOU/MM3 (ref 1–4.8)
MAGNESIUM: 1.9 MG/DL (ref 1.6–2.4)
MAGNESIUM: 1.9 MG/DL (ref 1.6–2.4)
MAGNESIUM: 2 MG/DL (ref 1.6–2.4)
MAGNESIUM: 2.1 MG/DL (ref 1.6–2.4)
MAGNESIUM: 2.2 MG/DL (ref 1.6–2.4)
MAGNESIUM: 2.2 MG/DL (ref 1.6–2.4)
MAGNESIUM: 2.4 MG/DL (ref 1.6–2.4)
MAGNESIUM: 2.4 MG/DL (ref 1.6–2.4)
MAGNESIUM: 2.5 MG/DL (ref 1.6–2.4)
MCH RBC QN AUTO: 23.1 PG (ref 25.2–33.5)
MCH RBC QN AUTO: 23.9 PG (ref 26–33)
MCH RBC QN AUTO: 24.9 PG (ref 25.2–33.5)
MCH RBC QN AUTO: 25.8 PG (ref 25.2–33.5)
MCH RBC QN AUTO: 25.8 PG (ref 26–33)
MCH RBC QN AUTO: 25.8 PG (ref 26–33)
MCH RBC QN AUTO: 25.9 PG (ref 26–33)
MCH RBC QN AUTO: 26 PG (ref 26–33)
MCH RBC QN AUTO: 26.1 PG (ref 26–33)
MCH RBC QN AUTO: 26.2 PG (ref 26–33)
MCH RBC QN AUTO: 26.2 PG (ref 26–33)
MCH RBC QN AUTO: 26.3 PG (ref 26–33)
MCH RBC QN AUTO: 26.4 PG (ref 26–33)
MCH RBC QN AUTO: 26.5 PG (ref 26–33)
MCH RBC QN AUTO: 26.6 PG (ref 26–33)
MCH RBC QN AUTO: 26.6 PG (ref 26–33)
MCH RBC QN AUTO: 26.7 PG (ref 26–33)
MCH RBC QN AUTO: 26.8 PG (ref 26–33)
MCH RBC QN AUTO: 27.1 PG (ref 26–33)
MCHC RBC AUTO-ENTMCNC: 29.2 G/DL (ref 28.4–34.8)
MCHC RBC AUTO-ENTMCNC: 29.4 G/DL (ref 28.4–34.8)
MCHC RBC AUTO-ENTMCNC: 30 G/DL (ref 28.4–34.8)
MCHC RBC AUTO-ENTMCNC: 30.6 GM/DL (ref 32.2–35.5)
MCHC RBC AUTO-ENTMCNC: 30.6 GM/DL (ref 32.2–35.5)
MCHC RBC AUTO-ENTMCNC: 30.7 GM/DL (ref 32.2–35.5)
MCHC RBC AUTO-ENTMCNC: 30.8 GM/DL (ref 32.2–35.5)
MCHC RBC AUTO-ENTMCNC: 30.8 GM/DL (ref 32.2–35.5)
MCHC RBC AUTO-ENTMCNC: 31 GM/DL (ref 32.2–35.5)
MCHC RBC AUTO-ENTMCNC: 31.1 GM/DL (ref 32.2–35.5)
MCHC RBC AUTO-ENTMCNC: 31.1 GM/DL (ref 32.2–35.5)
MCHC RBC AUTO-ENTMCNC: 31.2 GM/DL (ref 32.2–35.5)
MCHC RBC AUTO-ENTMCNC: 31.4 GM/DL (ref 32.2–35.5)
MCHC RBC AUTO-ENTMCNC: 31.6 GM/DL (ref 32.2–35.5)
MCHC RBC AUTO-ENTMCNC: 31.6 GM/DL (ref 32.2–35.5)
MCHC RBC AUTO-ENTMCNC: 31.7 GM/DL (ref 32.2–35.5)
MCV RBC AUTO: 78 FL (ref 80–94)
MCV RBC AUTO: 78.3 FL (ref 82.6–102.9)
MCV RBC AUTO: 82.9 FL (ref 80–94)
MCV RBC AUTO: 83 FL (ref 80–94)
MCV RBC AUTO: 83.4 FL (ref 80–94)
MCV RBC AUTO: 83.8 FL (ref 80–94)
MCV RBC AUTO: 83.9 FL (ref 80–94)
MCV RBC AUTO: 84.2 FL (ref 80–94)
MCV RBC AUTO: 84.8 FL (ref 80–94)
MCV RBC AUTO: 84.8 FL (ref 80–94)
MCV RBC AUTO: 84.9 FL (ref 80–94)
MCV RBC AUTO: 84.9 FL (ref 80–94)
MCV RBC AUTO: 85.1 FL (ref 82.6–102.9)
MCV RBC AUTO: 85.4 FL (ref 80–94)
MCV RBC AUTO: 85.8 FL (ref 80–94)
MCV RBC AUTO: 85.9 FL (ref 80–94)
MCV RBC AUTO: 86 FL (ref 80–94)
MCV RBC AUTO: 86 FL (ref 82.6–102.9)
MCV RBC AUTO: 86.2 FL (ref 80–94)
MCV RBC AUTO: 86.3 FL (ref 80–94)
MCV RBC AUTO: 86.7 FL (ref 80–94)
METAPNEUMOVIRUS BY PCR: NOT DETECTED
METAPNEUMOVIRUS BY PCR: NOT DETECTED
MISCELLANEOUS 2: ABNORMAL
MISCELLANEOUS 2: ABNORMAL
MISCELLANEOUS LAB TEST RESULT: ABNORMAL
MODE: ABNORMAL
MODE: AC
MONOCYTES # BLD: 11 % (ref 3–12)
MONOCYTES # BLD: 3.5 %
MONOCYTES # BLD: 3.7 %
MONOCYTES # BLD: 3.8 %
MONOCYTES # BLD: 4 %
MONOCYTES # BLD: 4.2 %
MONOCYTES # BLD: 4.3 %
MONOCYTES # BLD: 6.1 %
MONOCYTES # BLD: 6.7 %
MONOCYTES # BLD: 7 % (ref 3–12)
MONOCYTES # BLD: 7 % (ref 3–12)
MONOCYTES # BLD: 7.1 %
MONOCYTES # BLD: 7.7 %
MONOCYTES # BLD: 8.3 %
MONOCYTES ABSOLUTE: 0.3 THOU/MM3 (ref 0.4–1.3)
MONOCYTES ABSOLUTE: 0.4 THOU/MM3 (ref 0.4–1.3)
MONOCYTES ABSOLUTE: 0.6 THOU/MM3 (ref 0.4–1.3)
MONOCYTES ABSOLUTE: 0.7 THOU/MM3 (ref 0.4–1.3)
MONOCYTES ABSOLUTE: 0.8 THOU/MM3 (ref 0.4–1.3)
MONOCYTES ABSOLUTE: 0.8 THOU/MM3 (ref 0.4–1.3)
MONOCYTES ABSOLUTE: 1 THOU/MM3 (ref 0.4–1.3)
MONOCYTES ABSOLUTE: 1.1 THOU/MM3 (ref 0.4–1.3)
MORAXELLA CATARRHALIS BY PCR: NOT DETECTED
MORAXELLA CATARRHALIS BY PCR: NOT DETECTED
MRSA SCREEN RT-PCR: NEGATIVE
MRSA SCREEN: NORMAL
MUCUS: NORMAL
MYCOPLASMA PNEUMONIAE BY PCR: NOT DETECTED
MYCOPLASMA PNEUMONIAE BY PCR: NOT DETECTED
NITRITE, URINE: NEGATIVE
NITRITE, URINE: POSITIVE
NITRITE, URINE: POSITIVE
NRBC AUTOMATED: 0 PER 100 WBC
NRBC AUTOMATED: 0.2 PER 100 WBC
NRBC AUTOMATED: 2.1 PER 100 WBC
NUCLEATED RED BLOOD CELLS: 0 /100 WBC
NUCLEATED RED BLOOD CELLS: 1 /100 WBC
NUCLEATED RED BLOOD CELLS: 1 /100 WBC
NUCLEATED RED BLOOD CELLS: 12 /100 WBC
NUCLEATED RED BLOOD CELLS: 6 /100 WBC
NUCLEATED RED BLOOD CELLS: 7 /100 WBC
O2 SAT, MIXED: 47 %
O2 SATURATION: 89 %
O2 SATURATION: 91 %
O2 SATURATION: 92 %
O2 SATURATION: 93 %
O2 SATURATION: 94 %
ORGANISM: ABNORMAL
OSMOLALITY CALCULATION: 277.6 MOSMOL/KG (ref 275–300)
OSMOLALITY CALCULATION: 282.5 MOSMOL/KG (ref 275–300)
OTHER OBSERVATIONS UA: NORMAL
PARAINFLUENZA VIRUS BY PCR: NOT DETECTED
PARAINFLUENZA VIRUS BY PCR: NOT DETECTED
PATHOLOGIST REVIEW: ABNORMAL
PCO2, MIXED VENOUS: 68 MMHG (ref 41–51)
PCO2: 35 MMHG (ref 35–45)
PCO2: 38 MMHG (ref 35–45)
PCO2: 43 MMHG (ref 35–45)
PCO2: 70 MMHG (ref 35–45)
PCO2: 86 MMHG (ref 35–45)
PDW BLD-RTO: 17.6 % (ref 11.8–14.4)
PDW BLD-RTO: 18 % (ref 11.8–14.4)
PDW BLD-RTO: 19.8 % (ref 11.8–14.4)
PH BLOOD GAS: 7.15 (ref 7.35–7.45)
PH BLOOD GAS: 7.25 (ref 7.35–7.45)
PH BLOOD GAS: 7.4 (ref 7.35–7.45)
PH BLOOD GAS: 7.41 (ref 7.35–7.45)
PH BLOOD GAS: 7.44 (ref 7.35–7.45)
PH UA: 5 (ref 5–8)
PH UA: 5 (ref 5–9)
PH UA: 5 (ref 5–9)
PH UA: 5.5 (ref 5–9)
PH UA: 6.5 (ref 5–9)
PH, MIXED: 7.16 (ref 7.31–7.41)
PHOSPHORUS: 3.5 MG/DL (ref 2.4–4.7)
PLATELET # BLD: 140 THOU/MM3 (ref 130–400)
PLATELET # BLD: 142 THOU/MM3 (ref 130–400)
PLATELET # BLD: 150 THOU/MM3 (ref 130–400)
PLATELET # BLD: 153 THOU/MM3 (ref 130–400)
PLATELET # BLD: 158 THOU/MM3 (ref 130–400)
PLATELET # BLD: 159 THOU/MM3 (ref 130–400)
PLATELET # BLD: 160 THOU/MM3 (ref 130–400)
PLATELET # BLD: 161 THOU/MM3 (ref 130–400)
PLATELET # BLD: 162 THOU/MM3 (ref 130–400)
PLATELET # BLD: 170 THOU/MM3 (ref 130–400)
PLATELET # BLD: 176 THOU/MM3 (ref 130–400)
PLATELET # BLD: 180 THOU/MM3 (ref 130–400)
PLATELET # BLD: 180 THOU/MM3 (ref 130–400)
PLATELET # BLD: 181 THOU/MM3 (ref 130–400)
PLATELET # BLD: 193 THOU/MM3 (ref 130–400)
PLATELET # BLD: 194 THOU/MM3 (ref 130–400)
PLATELET # BLD: 198 THOU/MM3 (ref 130–400)
PLATELET # BLD: 209 THOU/MM3 (ref 130–400)
PLATELET # BLD: ABNORMAL K/UL (ref 138–453)
PLATELET ESTIMATE: ABNORMAL
PLATELET ESTIMATE: ADEQUATE
PLATELET, FLUORESCENCE: 176 K/UL (ref 138–453)
PLATELET, FLUORESCENCE: 181 K/UL (ref 138–453)
PLATELET, FLUORESCENCE: 239 K/UL (ref 138–453)
PLATELET, IMMATURE FRACTION: 14.2 % (ref 1.1–10.3)
PLATELET, IMMATURE FRACTION: 16.1 % (ref 1.1–10.3)
PLATELET, IMMATURE FRACTION: 16.4 % (ref 1.1–10.3)
PMV BLD AUTO: 11.1 FL (ref 9.4–12.4)
PMV BLD AUTO: ABNORMAL FL (ref 8.1–13.5)
PMV BLD AUTO: ABNORMAL FL (ref 9.4–12.4)
PO2 MIXED: 34 MMHG (ref 25–40)
PO2: 53 MMHG (ref 71–104)
PO2: 63 MMHG (ref 71–104)
PO2: 66 MMHG (ref 71–104)
PO2: 72 MMHG (ref 71–104)
PO2: 94 MMHG (ref 71–104)
POC CREATININE WHOLE BLOOD: 2 MG/DL (ref 0.5–1.2)
POC LACTIC ACID: 12 MMOL/L (ref 0.5–1.9)
POIKILOCYTES: ABNORMAL
POTASSIUM REFLEX MAGNESIUM: 4 MEQ/L (ref 3.5–5.2)
POTASSIUM REFLEX MAGNESIUM: 4.2 MEQ/L (ref 3.5–5.2)
POTASSIUM REFLEX MAGNESIUM: 4.7 MEQ/L (ref 3.5–5.2)
POTASSIUM REFLEX MAGNESIUM: 5.1 MEQ/L (ref 3.5–5.2)
POTASSIUM REFLEX MAGNESIUM: 5.1 MEQ/L (ref 3.5–5.2)
POTASSIUM REFLEX MAGNESIUM: 5.5 MEQ/L (ref 3.5–5.2)
POTASSIUM SERPL-SCNC: 2.9 MEQ/L (ref 3.5–5.2)
POTASSIUM SERPL-SCNC: 3.3 MEQ/L (ref 3.5–5.2)
POTASSIUM SERPL-SCNC: 3.4 MEQ/L (ref 3.5–5.2)
POTASSIUM SERPL-SCNC: 3.5 MEQ/L (ref 3.5–5.2)
POTASSIUM SERPL-SCNC: 3.5 MEQ/L (ref 3.5–5.2)
POTASSIUM SERPL-SCNC: 3.6 MEQ/L (ref 3.5–5.2)
POTASSIUM SERPL-SCNC: 3.7 MEQ/L (ref 3.5–5.2)
POTASSIUM SERPL-SCNC: 3.8 MMOL/L (ref 3.7–5.3)
POTASSIUM SERPL-SCNC: 4.1 MEQ/L (ref 3.5–5.2)
POTASSIUM SERPL-SCNC: 4.3 MEQ/L (ref 3.5–5.2)
POTASSIUM SERPL-SCNC: 4.4 MEQ/L (ref 3.5–5.2)
POTASSIUM SERPL-SCNC: 4.4 MEQ/L (ref 3.5–5.2)
POTASSIUM SERPL-SCNC: 4.8 MEQ/L (ref 3.5–5.2)
POTASSIUM SERPL-SCNC: 4.8 MEQ/L (ref 3.5–5.2)
POTASSIUM SERPL-SCNC: 5.1 MEQ/L (ref 3.5–5.2)
POTASSIUM SERPL-SCNC: 5.3 MEQ/L (ref 3.5–5.2)
POTASSIUM SERPL-SCNC: 5.7 MEQ/L (ref 3.5–5.2)
POTASSIUM, WHOLE BLOOD: 6.1 MEQ/L (ref 3.5–4.9)
PRO-BNP: 1637 PG/ML (ref 0–900)
PRO-BNP: 570.5 PG/ML (ref 0–900)
PRO-BNP: 577.9 PG/ML (ref 0–900)
PRO-BNP: 846.6 PG/ML (ref 0–900)
PROCALCITONIN: 0.15 NG/ML (ref 0.01–0.09)
PROCALCITONIN: 0.28 NG/ML (ref 0.01–0.09)
PROCALCITONIN: 0.36 NG/ML (ref 0.01–0.09)
PROCALCITONIN: 4.11 NG/ML (ref 0.01–0.09)
PROTEIN UA: 100
PROTEIN UA: 100
PROTEIN UA: 100 MG/DL
PROTEIN UA: 300
PROTEIN UA: ABNORMAL
PROTEUS SPECIES BY PCR: NOT DETECTED
PROTEUS SPECIES BY PCR: NOT DETECTED
PSEUDOMONAS AERUGINOSA BY PCR: NOT DETECTED
PSEUDOMONAS AERUGINOSA BY PCR: NOT DETECTED
RBC # BLD: 5.21 MILL/MM3 (ref 4.7–6.1)
RBC # BLD: 5.42 MILL/MM3 (ref 4.7–6.1)
RBC # BLD: 5.71 M/UL (ref 4.21–5.77)
RBC # BLD: 5.72 MILL/MM3 (ref 4.7–6.1)
RBC # BLD: 5.78 M/UL (ref 4.21–5.77)
RBC # BLD: 5.82 MILL/MM3 (ref 4.7–6.1)
RBC # BLD: 5.83 MILL/MM3 (ref 4.7–6.1)
RBC # BLD: 5.84 MILL/MM3 (ref 4.7–6.1)
RBC # BLD: 5.89 MILL/MM3 (ref 4.7–6.1)
RBC # BLD: 5.96 MILL/MM3 (ref 4.7–6.1)
RBC # BLD: 6.05 MILL/MM3 (ref 4.7–6.1)
RBC # BLD: 6.1 MILL/MM3 (ref 4.7–6.1)
RBC # BLD: 6.1 MILL/MM3 (ref 4.7–6.1)
RBC # BLD: 6.15 MILL/MM3 (ref 4.7–6.1)
RBC # BLD: 6.25 MILL/MM3 (ref 4.7–6.1)
RBC # BLD: 6.27 MILL/MM3 (ref 4.7–6.1)
RBC # BLD: 6.51 MILL/MM3 (ref 4.7–6.1)
RBC # BLD: 6.57 MILL/MM3 (ref 4.7–6.1)
RBC # BLD: 6.71 MILL/MM3 (ref 4.7–6.1)
RBC # BLD: 6.8 MILL/MM3 (ref 4.7–6.1)
RBC # BLD: 7.11 M/UL (ref 4.21–5.77)
RBC # BLD: ABNORMAL 10*6/UL
RBC UA: NORMAL /HPF (ref 0–4)
RBC URINE: > 100 /HPF
RBC URINE: ABNORMAL /HPF
REASON FOR REJECTION: NORMAL
REJECTED TEST: NORMAL
RENAL EPITHELIAL, UA: ABNORMAL
RENAL EPITHELIAL, UA: NORMAL /HPF
RESISTANT GENE CTX-M BY PCR: NORMAL
RESISTANT GENE CTX-M BY PCR: NOT DETECTED
RESISTANT GENE IMP BY PCR: NORMAL
RESISTANT GENE IMP BY PCR: NOT DETECTED
RESISTANT GENE KPC BY PCR: NORMAL
RESISTANT GENE KPC BY PCR: NOT DETECTED
RESISTANT GENE MECA/C & MREJ BY PCR: NORMAL
RESISTANT GENE MECA/C & MREJ BY PCR: NOT DETECTED
RESISTANT GENE NDM BY PCR: NORMAL
RESISTANT GENE NDM BY PCR: NOT DETECTED
RESISTANT GENE OXA-48-LIKE BY PCR: NORMAL
RESISTANT GENE OXA-48-LIKE BY PCR: NOT DETECTED
RESISTANT GENE VIM BY PCR: NORMAL
RESISTANT GENE VIM BY PCR: NOT DETECTED
RESPIRATORY CULTURE: ABNORMAL
RESPIRATORY CULTURE: ABNORMAL
RESPIRATORY CULTURE: NORMAL
RESPIRATORY SYNCYTIAL VIRUS BY PCR: NOT DETECTED
RESPIRATORY SYNCYTIAL VIRUS BY PCR: NOT DETECTED
RH FACTOR: NORMAL
RH FACTOR: NORMAL
RHINOVIRUS ENTEROVIRUS PCR: NOT DETECTED
RHINOVIRUS ENTEROVIRUS PCR: NOT DETECTED
SARS-COV-2, NAAT: DETECTED
SARS-COV-2, NAAT: NOT DETECTED
SARS-COV-2, NAAT: NOT DETECTED
SCAN OF BLOOD SMEAR: NORMAL
SEG NEUTROPHILS: 66 % (ref 36–65)
SEG NEUTROPHILS: 68 % (ref 36–65)
SEG NEUTROPHILS: 72 % (ref 36–65)
SEG NEUTROPHILS: 75.5 %
SEG NEUTROPHILS: 78.4 %
SEG NEUTROPHILS: 79.7 %
SEG NEUTROPHILS: 81.1 %
SEG NEUTROPHILS: 83.9 %
SEG NEUTROPHILS: 85.5 %
SEG NEUTROPHILS: 86.4 %
SEG NEUTROPHILS: 86.5 %
SEG NEUTROPHILS: 89.5 %
SEG NEUTROPHILS: 90.3 %
SEG NEUTROPHILS: 91.5 %
SEGMENTED NEUTROPHILS ABSOLUTE COUNT: 10.6 THOU/MM3 (ref 1.8–7.7)
SEGMENTED NEUTROPHILS ABSOLUTE COUNT: 11.2 THOU/MM3 (ref 1.8–7.7)
SEGMENTED NEUTROPHILS ABSOLUTE COUNT: 13.6 THOU/MM3 (ref 1.8–7.7)
SEGMENTED NEUTROPHILS ABSOLUTE COUNT: 4.68 K/UL (ref 1.5–8.1)
SEGMENTED NEUTROPHILS ABSOLUTE COUNT: 5.65 K/UL (ref 1.5–8.1)
SEGMENTED NEUTROPHILS ABSOLUTE COUNT: 6.13 K/UL (ref 1.5–8.1)
SEGMENTED NEUTROPHILS ABSOLUTE COUNT: 7.9 THOU/MM3 (ref 1.8–7.7)
SEGMENTED NEUTROPHILS ABSOLUTE COUNT: 7.9 THOU/MM3 (ref 1.8–7.7)
SEGMENTED NEUTROPHILS ABSOLUTE COUNT: 8 THOU/MM3 (ref 1.8–7.7)
SEGMENTED NEUTROPHILS ABSOLUTE COUNT: 8.2 THOU/MM3 (ref 1.8–7.7)
SEGMENTED NEUTROPHILS ABSOLUTE COUNT: 8.5 THOU/MM3 (ref 1.8–7.7)
SEGMENTED NEUTROPHILS ABSOLUTE COUNT: 8.6 THOU/MM3 (ref 1.8–7.7)
SEGMENTED NEUTROPHILS ABSOLUTE COUNT: 8.8 THOU/MM3 (ref 1.8–7.7)
SEGMENTED NEUTROPHILS ABSOLUTE COUNT: 9.6 THOU/MM3 (ref 1.8–7.7)
SERRATIA MARCESCENS BY PCR: NOT DETECTED
SERRATIA MARCESCENS BY PCR: NOT DETECTED
SET PEEP: 10 MMHG
SET PEEP: 10 MMHG
SET PEEP: 14 MMHG
SET PRESS SUPP: 16 CMH2O
SET PRESS SUPP: 18 CMH2O
SET RESPIRATORY RATE: 18 BPM
SET RESPIRATORY RATE: 8 BPM
SET TIDAL VOLUME: 550 ML
SEX HORMONE BINDING GLOBULIN: 19 NMOL/L (ref 11–80)
SODIUM BLD-SCNC: 133 MEQ/L (ref 135–145)
SODIUM BLD-SCNC: 134 MEQ/L (ref 135–145)
SODIUM BLD-SCNC: 134 MEQ/L (ref 135–145)
SODIUM BLD-SCNC: 135 MEQ/L (ref 135–145)
SODIUM BLD-SCNC: 135 MEQ/L (ref 135–145)
SODIUM BLD-SCNC: 136 MEQ/L (ref 135–145)
SODIUM BLD-SCNC: 137 MEQ/L (ref 135–145)
SODIUM BLD-SCNC: 138 MEQ/L (ref 135–145)
SODIUM BLD-SCNC: 139 MEQ/L (ref 135–145)
SODIUM BLD-SCNC: 140 MMOL/L (ref 135–144)
SODIUM BLD-SCNC: 141 MEQ/L (ref 135–145)
SODIUM BLD-SCNC: 143 MEQ/L (ref 135–145)
SODIUM BLD-SCNC: 144 MEQ/L (ref 135–145)
SODIUM BLD-SCNC: 144 MEQ/L (ref 135–145)
SODIUM BLD-SCNC: 145 MEQ/L (ref 135–145)
SODIUM BLD-SCNC: 148 MEQ/L (ref 135–145)
SODIUM URINE: 112 MEQ/L
SODIUM, WHOLE BLOOD: 141 MEQ/L (ref 138–146)
SOURCE, BLOOD GAS: ABNORMAL
SOURCE: ABNORMAL
SOURCE: NORMAL
SPECIFIC GRAVITY UA: 1.02 (ref 1–1.03)
SPECIFIC GRAVITY UA: > 1.03 (ref 1–1.03)
SPECIFIC GRAVITY, URINE: 1.01 (ref 1–1.03)
SPECIFIC GRAVITY, URINE: 1.02 (ref 1–1.03)
SPECIFIC GRAVITY, URINE: 1.02 (ref 1–1.03)
SPECIMEN ACCEPTABILITY: ABNORMAL
SPECIMEN ACCEPTABILITY: NORMAL
STAPH AUREUS BY PCR: DETECTED
STAPH AUREUS BY PCR: NOT DETECTED
STREP AGALACTIAE BY PCR: NOT DETECTED
STREP AGALACTIAE BY PCR: NOT DETECTED
STREP PNEUMO AG, UR: NEGATIVE
STREP PNEUMO AG, UR: NEGATIVE
STREP PNEUMONIAE BY PCR: NOT DETECTED
STREP PNEUMONIAE BY PCR: NOT DETECTED
STREP PYOGENES BY PCR: NOT DETECTED
STREP PYOGENES BY PCR: NOT DETECTED
TESTOSTERONE FREE-NONMALE: 283.9 PG/ML (ref 47–244)
TESTOSTERONE TOTAL: 224 NG/DL (ref 220–1000)
TESTOSTERONE TOTAL: 937 NG/DL (ref 220–1000)
TISSUE TRANSGLUTAMINASE IGA: 0.5 U/ML
TOTAL IRON BINDING CAPACITY: 263 UG/DL (ref 250–450)
TOTAL IRON BINDING CAPACITY: 447 UG/DL (ref 250–450)
TOTAL PROTEIN: 6.9 G/DL (ref 6.1–8)
TOTAL PROTEIN: 6.9 G/DL (ref 6.1–8)
TOTAL PROTEIN: 7.3 G/DL (ref 6.1–8)
TOTAL PROTEIN: 7.4 G/DL (ref 6.1–8)
TOTAL PROTEIN: 7.5 G/DL (ref 6.1–8)
TOTAL PROTEIN: 7.6 G/DL (ref 6.1–8)
TOTAL PROTEIN: 7.7 G/DL (ref 6.1–8)
TOTAL PROTEIN: 8.1 G/DL (ref 6.1–8)
TRICHOMONAS: NORMAL
TRIGL SERPL-MCNC: 504 MG/DL
TROPONIN T: 0.02 NG/ML
TROPONIN T: 0.02 NG/ML
TROPONIN T: < 0.01 NG/ML
TROPONIN T: < 0.01 NG/ML
TSH SERPL DL<=0.05 MIU/L-ACNC: 0.66 UIU/ML (ref 0.4–4.2)
TSH SERPL DL<=0.05 MIU/L-ACNC: 2.02 MIU/L (ref 0.3–5)
TURBIDITY: CLEAR
UNSATURATED IRON BINDING CAPACITY: 224 UG/DL (ref 112–347)
UNSATURATED IRON BINDING CAPACITY: 400 UG/DL (ref 112–347)
URINE CULTURE REFLEX: ABNORMAL
URINE CULTURE REFLEX: ABNORMAL
URINE CULTURE REFLEX: NORMAL
URINE CULTURE, ROUTINE: ABNORMAL
URINE HGB: NEGATIVE
UROBILINOGEN, URINE: 0.2 EU/DL (ref 0–1)
UROBILINOGEN, URINE: NORMAL
VANCOMYCIN PEAK: 32.3 UG/ML (ref 20–40)
VANCOMYCIN RESISTANT ENTEROCOCCUS: NEGATIVE
VANCOMYCIN TROUGH: 36.9 UG/ML (ref 5–15)
VLDLC SERPL CALC-MCNC: ABNORMAL MG/DL (ref 1–30)
WBC # BLD: 10 THOU/MM3 (ref 4.8–10.8)
WBC # BLD: 10.2 THOU/MM3 (ref 4.8–10.8)
WBC # BLD: 10.5 THOU/MM3 (ref 4.8–10.8)
WBC # BLD: 12.2 THOU/MM3 (ref 4.8–10.8)
WBC # BLD: 12.3 THOU/MM3 (ref 4.8–10.8)
WBC # BLD: 12.7 THOU/MM3 (ref 4.8–10.8)
WBC # BLD: 13.1 THOU/MM3 (ref 4.8–10.8)
WBC # BLD: 13.4 THOU/MM3 (ref 4.8–10.8)
WBC # BLD: 13.6 THOU/MM3 (ref 4.8–10.8)
WBC # BLD: 13.9 THOU/MM3 (ref 4.8–10.8)
WBC # BLD: 14.9 THOU/MM3 (ref 4.8–10.8)
WBC # BLD: 7.1 K/UL (ref 3.5–11.3)
WBC # BLD: 7.2 THOU/MM3 (ref 4.8–10.8)
WBC # BLD: 8.4 K/UL (ref 3.5–11.3)
WBC # BLD: 8.5 K/UL (ref 3.5–11.3)
WBC # BLD: 8.9 THOU/MM3 (ref 4.8–10.8)
WBC # BLD: 9.7 THOU/MM3 (ref 4.8–10.8)
WBC # BLD: 9.8 THOU/MM3 (ref 4.8–10.8)
WBC # BLD: 9.8 THOU/MM3 (ref 4.8–10.8)
WBC # BLD: 9.9 THOU/MM3 (ref 4.8–10.8)
WBC # BLD: 9.9 THOU/MM3 (ref 4.8–10.8)
WBC # BLD: ABNORMAL 10*3/UL
WBC UA: ABNORMAL /HPF
WBC UA: NORMAL /HPF (ref 0–5)
YEAST: ABNORMAL
YEAST: NORMAL

## 2020-01-01 PROCEDURE — 85027 COMPLETE CBC AUTOMATED: CPT

## 2020-01-01 PROCEDURE — 6360000002 HC RX W HCPCS: Performed by: EMERGENCY MEDICINE

## 2020-01-01 PROCEDURE — G8417 CALC BMI ABV UP PARAM F/U: HCPCS | Performed by: NURSE PRACTITIONER

## 2020-01-01 PROCEDURE — 82803 BLOOD GASES ANY COMBINATION: CPT

## 2020-01-01 PROCEDURE — 2500000003 HC RX 250 WO HCPCS: Performed by: NURSE PRACTITIONER

## 2020-01-01 PROCEDURE — 94761 N-INVAS EAR/PLS OXIMETRY MLT: CPT

## 2020-01-01 PROCEDURE — 84145 PROCALCITONIN (PCT): CPT

## 2020-01-01 PROCEDURE — 2700000000 HC OXYGEN THERAPY PER DAY

## 2020-01-01 PROCEDURE — 6360000002 HC RX W HCPCS: Performed by: NURSE PRACTITIONER

## 2020-01-01 PROCEDURE — 99223 1ST HOSP IP/OBS HIGH 75: CPT | Performed by: INTERNAL MEDICINE

## 2020-01-01 PROCEDURE — 6370000000 HC RX 637 (ALT 250 FOR IP): Performed by: PHYSICIAN ASSISTANT

## 2020-01-01 PROCEDURE — 6360000002 HC RX W HCPCS: Performed by: INTERNAL MEDICINE

## 2020-01-01 PROCEDURE — 99233 SBSQ HOSP IP/OBS HIGH 50: CPT | Performed by: INTERNAL MEDICINE

## 2020-01-01 PROCEDURE — 82948 REAGENT STRIP/BLOOD GLUCOSE: CPT

## 2020-01-01 PROCEDURE — 6370000000 HC RX 637 (ALT 250 FOR IP): Performed by: NURSE PRACTITIONER

## 2020-01-01 PROCEDURE — 99291 CRITICAL CARE FIRST HOUR: CPT | Performed by: PSYCHIATRY & NEUROLOGY

## 2020-01-01 PROCEDURE — 87205 SMEAR GRAM STAIN: CPT

## 2020-01-01 PROCEDURE — 2580000003 HC RX 258: Performed by: EMERGENCY MEDICINE

## 2020-01-01 PROCEDURE — 97530 THERAPEUTIC ACTIVITIES: CPT

## 2020-01-01 PROCEDURE — 2580000003 HC RX 258: Performed by: INTERNAL MEDICINE

## 2020-01-01 PROCEDURE — 36415 COLL VENOUS BLD VENIPUNCTURE: CPT

## 2020-01-01 PROCEDURE — 71045 X-RAY EXAM CHEST 1 VIEW: CPT

## 2020-01-01 PROCEDURE — 85379 FIBRIN DEGRADATION QUANT: CPT

## 2020-01-01 PROCEDURE — 2500000003 HC RX 250 WO HCPCS: Performed by: STUDENT IN AN ORGANIZED HEALTH CARE EDUCATION/TRAINING PROGRAM

## 2020-01-01 PROCEDURE — 97535 SELF CARE MNGMENT TRAINING: CPT

## 2020-01-01 PROCEDURE — 85610 PROTHROMBIN TIME: CPT

## 2020-01-01 PROCEDURE — 82330 ASSAY OF CALCIUM: CPT

## 2020-01-01 PROCEDURE — 2580000003 HC RX 258: Performed by: NURSE PRACTITIONER

## 2020-01-01 PROCEDURE — 6360000002 HC RX W HCPCS: Performed by: STUDENT IN AN ORGANIZED HEALTH CARE EDUCATION/TRAINING PROGRAM

## 2020-01-01 PROCEDURE — 99232 SBSQ HOSP IP/OBS MODERATE 35: CPT | Performed by: PHYSICIAN ASSISTANT

## 2020-01-01 PROCEDURE — 86850 RBC ANTIBODY SCREEN: CPT

## 2020-01-01 PROCEDURE — 94640 AIRWAY INHALATION TREATMENT: CPT

## 2020-01-01 PROCEDURE — 6370000000 HC RX 637 (ALT 250 FOR IP): Performed by: STUDENT IN AN ORGANIZED HEALTH CARE EDUCATION/TRAINING PROGRAM

## 2020-01-01 PROCEDURE — 85730 THROMBOPLASTIN TIME PARTIAL: CPT

## 2020-01-01 PROCEDURE — 97110 THERAPEUTIC EXERCISES: CPT

## 2020-01-01 PROCEDURE — 85025 COMPLETE CBC W/AUTO DIFF WBC: CPT

## 2020-01-01 PROCEDURE — 71275 CT ANGIOGRAPHY CHEST: CPT

## 2020-01-01 PROCEDURE — 2000000000 HC ICU R&B

## 2020-01-01 PROCEDURE — 6360000002 HC RX W HCPCS

## 2020-01-01 PROCEDURE — 80048 BASIC METABOLIC PNL TOTAL CA: CPT

## 2020-01-01 PROCEDURE — 2060000000 HC ICU INTERMEDIATE R&B

## 2020-01-01 PROCEDURE — U0002 COVID-19 LAB TEST NON-CDC: HCPCS

## 2020-01-01 PROCEDURE — 70496 CT ANGIOGRAPHY HEAD: CPT

## 2020-01-01 PROCEDURE — 89220 SPUTUM SPECIMEN COLLECTION: CPT

## 2020-01-01 PROCEDURE — 96375 TX/PRO/DX INJ NEW DRUG ADDON: CPT

## 2020-01-01 PROCEDURE — 3023F SPIROM DOC REV: CPT | Performed by: NURSE PRACTITIONER

## 2020-01-01 PROCEDURE — 93307 TTE W/O DOPPLER COMPLETE: CPT

## 2020-01-01 PROCEDURE — 36600 WITHDRAWAL OF ARTERIAL BLOOD: CPT

## 2020-01-01 PROCEDURE — 2580000003 HC RX 258: Performed by: PHYSICIAN ASSISTANT

## 2020-01-01 PROCEDURE — 87631 RESP VIRUS 3-5 TARGETS: CPT

## 2020-01-01 PROCEDURE — 6370000000 HC RX 637 (ALT 250 FOR IP): Performed by: INTERNAL MEDICINE

## 2020-01-01 PROCEDURE — 84484 ASSAY OF TROPONIN QUANT: CPT

## 2020-01-01 PROCEDURE — 6360000002 HC RX W HCPCS: Performed by: PHYSICIAN ASSISTANT

## 2020-01-01 PROCEDURE — 94003 VENT MGMT INPAT SUBQ DAY: CPT

## 2020-01-01 PROCEDURE — 85385 FIBRINOGEN ANTIGEN: CPT

## 2020-01-01 PROCEDURE — 2500000003 HC RX 250 WO HCPCS: Performed by: INTERNAL MEDICINE

## 2020-01-01 PROCEDURE — 2580000003 HC RX 258: Performed by: STUDENT IN AN ORGANIZED HEALTH CARE EDUCATION/TRAINING PROGRAM

## 2020-01-01 PROCEDURE — 84443 ASSAY THYROID STIM HORMONE: CPT

## 2020-01-01 PROCEDURE — 93005 ELECTROCARDIOGRAM TRACING: CPT | Performed by: INTERNAL MEDICINE

## 2020-01-01 PROCEDURE — 99291 CRITICAL CARE FIRST HOUR: CPT | Performed by: INTERNAL MEDICINE

## 2020-01-01 PROCEDURE — 83880 ASSAY OF NATRIURETIC PEPTIDE: CPT

## 2020-01-01 PROCEDURE — 80202 ASSAY OF VANCOMYCIN: CPT

## 2020-01-01 PROCEDURE — 99221 1ST HOSP IP/OBS SF/LOW 40: CPT | Performed by: NURSE PRACTITIONER

## 2020-01-01 PROCEDURE — 94770 HC ETCO2 MONITOR DAILY: CPT

## 2020-01-01 PROCEDURE — 87186 SC STD MICRODIL/AGAR DIL: CPT

## 2020-01-01 PROCEDURE — 86900 BLOOD TYPING SEROLOGIC ABO: CPT

## 2020-01-01 PROCEDURE — G8427 DOCREV CUR MEDS BY ELIG CLIN: HCPCS | Performed by: NURSE PRACTITIONER

## 2020-01-01 PROCEDURE — 83735 ASSAY OF MAGNESIUM: CPT

## 2020-01-01 PROCEDURE — 99284 EMERGENCY DEPT VISIT MOD MDM: CPT

## 2020-01-01 PROCEDURE — 93005 ELECTROCARDIOGRAM TRACING: CPT | Performed by: STUDENT IN AN ORGANIZED HEALTH CARE EDUCATION/TRAINING PROGRAM

## 2020-01-01 PROCEDURE — 6360000004 HC RX CONTRAST MEDICATION: Performed by: NUCLEAR MEDICINE

## 2020-01-01 PROCEDURE — G8427 DOCREV CUR MEDS BY ELIG CLIN: HCPCS | Performed by: NUCLEAR MEDICINE

## 2020-01-01 PROCEDURE — 96360 HYDRATION IV INFUSION INIT: CPT

## 2020-01-01 PROCEDURE — 94760 N-INVAS EAR/PLS OXIMETRY 1: CPT

## 2020-01-01 PROCEDURE — 93010 ELECTROCARDIOGRAM REPORT: CPT | Performed by: INTERNAL MEDICINE

## 2020-01-01 PROCEDURE — 31500 INSERT EMERGENCY AIRWAY: CPT | Performed by: INTERNAL MEDICINE

## 2020-01-01 PROCEDURE — 81001 URINALYSIS AUTO W/SCOPE: CPT

## 2020-01-01 PROCEDURE — 2709999900 HC NON-CHARGEABLE SUPPLY

## 2020-01-01 PROCEDURE — C1769 GUIDE WIRE: HCPCS

## 2020-01-01 PROCEDURE — 99232 SBSQ HOSP IP/OBS MODERATE 35: CPT | Performed by: NURSE PRACTITIONER

## 2020-01-01 PROCEDURE — 97166 OT EVAL MOD COMPLEX 45 MIN: CPT

## 2020-01-01 PROCEDURE — 5A1945Z RESPIRATORY VENTILATION, 24-96 CONSECUTIVE HOURS: ICD-10-PCS | Performed by: INTERNAL MEDICINE

## 2020-01-01 PROCEDURE — 84100 ASSAY OF PHOSPHORUS: CPT

## 2020-01-01 PROCEDURE — 74018 RADEX ABDOMEN 1 VIEW: CPT

## 2020-01-01 PROCEDURE — 96361 HYDRATE IV INFUSION ADD-ON: CPT

## 2020-01-01 PROCEDURE — 0BH18EZ INSERTION OF ENDOTRACHEAL AIRWAY INTO TRACHEA, VIA NATURAL OR ARTIFICIAL OPENING ENDOSCOPIC: ICD-10-PCS | Performed by: INTERNAL MEDICINE

## 2020-01-01 PROCEDURE — G8484 FLU IMMUNIZE NO ADMIN: HCPCS | Performed by: NUCLEAR MEDICINE

## 2020-01-01 PROCEDURE — 87147 CULTURE TYPE IMMUNOLOGIC: CPT

## 2020-01-01 PROCEDURE — 87486 CHLMYD PNEUM DNA AMP PROBE: CPT

## 2020-01-01 PROCEDURE — 3017F COLORECTAL CA SCREEN DOC REV: CPT | Performed by: NUCLEAR MEDICINE

## 2020-01-01 PROCEDURE — 93452 LEFT HRT CATH W/VENTRCLGRPHY: CPT | Performed by: NUCLEAR MEDICINE

## 2020-01-01 PROCEDURE — 93458 L HRT ARTERY/VENTRICLE ANGIO: CPT | Performed by: NUCLEAR MEDICINE

## 2020-01-01 PROCEDURE — 84132 ASSAY OF SERUM POTASSIUM: CPT

## 2020-01-01 PROCEDURE — 95012 NITRIC OXIDE EXP GAS DETER: CPT | Performed by: NURSE PRACTITIONER

## 2020-01-01 PROCEDURE — P9059 PLASMA, FRZ BETWEEN 8-24HOUR: HCPCS

## 2020-01-01 PROCEDURE — 83615 LACTATE (LD) (LDH) ENZYME: CPT

## 2020-01-01 PROCEDURE — 80053 COMPREHEN METABOLIC PANEL: CPT

## 2020-01-01 PROCEDURE — 36556 INSERT NON-TUNNEL CV CATH: CPT

## 2020-01-01 PROCEDURE — 76937 US GUIDE VASCULAR ACCESS: CPT

## 2020-01-01 PROCEDURE — 05H933Z INSERTION OF INFUSION DEVICE INTO RIGHT BRACHIAL VEIN, PERCUTANEOUS APPROACH: ICD-10-PCS | Performed by: NURSE PRACTITIONER

## 2020-01-01 PROCEDURE — 82010 KETONE BODYS QUAN: CPT

## 2020-01-01 PROCEDURE — 87070 CULTURE OTHR SPECIMN AEROBIC: CPT

## 2020-01-01 PROCEDURE — 4040F PNEUMOC VAC/ADMIN/RCVD: CPT | Performed by: NUCLEAR MEDICINE

## 2020-01-01 PROCEDURE — 94060 EVALUATION OF WHEEZING: CPT

## 2020-01-01 PROCEDURE — 87150 DNA/RNA AMPLIFIED PROBE: CPT

## 2020-01-01 PROCEDURE — G8417 CALC BMI ABV UP PARAM F/U: HCPCS | Performed by: NUCLEAR MEDICINE

## 2020-01-01 PROCEDURE — 87081 CULTURE SCREEN ONLY: CPT

## 2020-01-01 PROCEDURE — 93005 ELECTROCARDIOGRAM TRACING: CPT | Performed by: PHYSICIAN ASSISTANT

## 2020-01-01 PROCEDURE — 83605 ASSAY OF LACTIC ACID: CPT

## 2020-01-01 PROCEDURE — 2500000003 HC RX 250 WO HCPCS: Performed by: EMERGENCY MEDICINE

## 2020-01-01 PROCEDURE — 99238 HOSP IP/OBS DSCHRG MGMT 30/<: CPT | Performed by: NURSE PRACTITIONER

## 2020-01-01 PROCEDURE — 93005 ELECTROCARDIOGRAM TRACING: CPT | Performed by: NURSE PRACTITIONER

## 2020-01-01 PROCEDURE — 86901 BLOOD TYPING SEROLOGIC RH(D): CPT

## 2020-01-01 PROCEDURE — 87040 BLOOD CULTURE FOR BACTERIA: CPT

## 2020-01-01 PROCEDURE — 94660 CPAP INITIATION&MGMT: CPT

## 2020-01-01 PROCEDURE — 6360000004 HC RX CONTRAST MEDICATION: Performed by: NURSE PRACTITIONER

## 2020-01-01 PROCEDURE — 99214 OFFICE O/P EST MOD 30 MIN: CPT | Performed by: NURSE PRACTITIONER

## 2020-01-01 PROCEDURE — 2580000003 HC RX 258: Performed by: FAMILY MEDICINE

## 2020-01-01 PROCEDURE — 99285 EMERGENCY DEPT VISIT HI MDM: CPT

## 2020-01-01 PROCEDURE — 99232 SBSQ HOSP IP/OBS MODERATE 35: CPT | Performed by: INTERNAL MEDICINE

## 2020-01-01 PROCEDURE — 93000 ELECTROCARDIOGRAM COMPLETE: CPT | Performed by: NUCLEAR MEDICINE

## 2020-01-01 PROCEDURE — 94726 PLETHYSMOGRAPHY LUNG VOLUMES: CPT

## 2020-01-01 PROCEDURE — 6370000000 HC RX 637 (ALT 250 FOR IP): Performed by: FAMILY MEDICINE

## 2020-01-01 PROCEDURE — 1123F ACP DISCUSS/DSCN MKR DOCD: CPT | Performed by: NURSE PRACTITIONER

## 2020-01-01 PROCEDURE — 3017F COLORECTAL CA SCREEN DOC REV: CPT | Performed by: NURSE PRACTITIONER

## 2020-01-01 PROCEDURE — 87086 URINE CULTURE/COLONY COUNT: CPT

## 2020-01-01 PROCEDURE — 94002 VENT MGMT INPAT INIT DAY: CPT

## 2020-01-01 PROCEDURE — 1036F TOBACCO NON-USER: CPT | Performed by: NURSE PRACTITIONER

## 2020-01-01 PROCEDURE — 96374 THER/PROPH/DIAG INJ IV PUSH: CPT

## 2020-01-01 PROCEDURE — 70498 CT ANGIOGRAPHY NECK: CPT

## 2020-01-01 PROCEDURE — 87798 DETECT AGENT NOS DNA AMP: CPT

## 2020-01-01 PROCEDURE — 87641 MR-STAPH DNA AMP PROBE: CPT

## 2020-01-01 PROCEDURE — 87449 NOS EACH ORGANISM AG IA: CPT

## 2020-01-01 PROCEDURE — 6360000004 HC RX CONTRAST MEDICATION: Performed by: STUDENT IN AN ORGANIZED HEALTH CARE EDUCATION/TRAINING PROGRAM

## 2020-01-01 PROCEDURE — 4040F PNEUMOC VAC/ADMIN/RCVD: CPT | Performed by: NURSE PRACTITIONER

## 2020-01-01 PROCEDURE — 87899 AGENT NOS ASSAY W/OPTIC: CPT

## 2020-01-01 PROCEDURE — 87077 CULTURE AEROBIC IDENTIFY: CPT

## 2020-01-01 PROCEDURE — 70450 CT HEAD/BRAIN W/O DYE: CPT

## 2020-01-01 PROCEDURE — 87541 LEGION PNEUMO DNA AMP PROB: CPT

## 2020-01-01 PROCEDURE — 82248 BILIRUBIN DIRECT: CPT

## 2020-01-01 PROCEDURE — 94669 MECHANICAL CHEST WALL OSCILL: CPT

## 2020-01-01 PROCEDURE — 94618 PULMONARY STRESS TESTING: CPT | Performed by: NURSE PRACTITIONER

## 2020-01-01 PROCEDURE — 36430 TRANSFUSION BLD/BLD COMPNT: CPT

## 2020-01-01 PROCEDURE — 99999 6 MIN WALK TEST: CPT | Performed by: NURSE PRACTITIONER

## 2020-01-01 PROCEDURE — 82565 ASSAY OF CREATININE: CPT

## 2020-01-01 PROCEDURE — C1751 CATH, INF, PER/CENT/MIDLINE: HCPCS

## 2020-01-01 PROCEDURE — 76705 ECHO EXAM OF ABDOMEN: CPT

## 2020-01-01 PROCEDURE — 1123F ACP DISCUSS/DSCN MKR DOCD: CPT | Performed by: NUCLEAR MEDICINE

## 2020-01-01 PROCEDURE — 1036F TOBACCO NON-USER: CPT | Performed by: NUCLEAR MEDICINE

## 2020-01-01 PROCEDURE — 2500000003 HC RX 250 WO HCPCS: Performed by: PHYSICIAN ASSISTANT

## 2020-01-01 PROCEDURE — 80076 HEPATIC FUNCTION PANEL: CPT

## 2020-01-01 PROCEDURE — 87500 VANOMYCIN DNA AMP PROBE: CPT

## 2020-01-01 PROCEDURE — 2500000003 HC RX 250 WO HCPCS: Performed by: FAMILY MEDICINE

## 2020-01-01 PROCEDURE — APPSS180 APP SPLIT SHARED TIME > 60 MINUTES: Performed by: NURSE PRACTITIONER

## 2020-01-01 PROCEDURE — 87581 M.PNEUMON DNA AMP PROBE: CPT

## 2020-01-01 PROCEDURE — G8484 FLU IMMUNIZE NO ADMIN: HCPCS | Performed by: NURSE PRACTITIONER

## 2020-01-01 PROCEDURE — 2500000003 HC RX 250 WO HCPCS

## 2020-01-01 PROCEDURE — 84300 ASSAY OF URINE SODIUM: CPT

## 2020-01-01 PROCEDURE — 82435 ASSAY OF BLOOD CHLORIDE: CPT

## 2020-01-01 PROCEDURE — C1894 INTRO/SHEATH, NON-LASER: HCPCS

## 2020-01-01 PROCEDURE — 97163 PT EVAL HIGH COMPLEX 45 MIN: CPT

## 2020-01-01 PROCEDURE — 02HV33Z INSERTION OF INFUSION DEVICE INTO SUPERIOR VENA CAVA, PERCUTANEOUS APPROACH: ICD-10-PCS | Performed by: EMERGENCY MEDICINE

## 2020-01-01 PROCEDURE — 99239 HOSP IP/OBS DSCHRG MGMT >30: CPT | Performed by: INTERNAL MEDICINE

## 2020-01-01 PROCEDURE — 82570 ASSAY OF URINE CREATININE: CPT

## 2020-01-01 PROCEDURE — 93306 TTE W/DOPPLER COMPLETE: CPT

## 2020-01-01 PROCEDURE — 31500 INSERT EMERGENCY AIRWAY: CPT

## 2020-01-01 PROCEDURE — 6360000002 HC RX W HCPCS: Performed by: FAMILY MEDICINE

## 2020-01-01 PROCEDURE — 99204 OFFICE O/P NEW MOD 45 MIN: CPT | Performed by: NUCLEAR MEDICINE

## 2020-01-01 PROCEDURE — 82728 ASSAY OF FERRITIN: CPT

## 2020-01-01 PROCEDURE — 82947 ASSAY GLUCOSE BLOOD QUANT: CPT

## 2020-01-01 PROCEDURE — 83516 IMMUNOASSAY NONANTIBODY: CPT

## 2020-01-01 PROCEDURE — 84295 ASSAY OF SERUM SODIUM: CPT

## 2020-01-01 PROCEDURE — 97116 GAIT TRAINING THERAPY: CPT

## 2020-01-01 PROCEDURE — 97162 PT EVAL MOD COMPLEX 30 MIN: CPT

## 2020-01-01 PROCEDURE — 31500 INSERT EMERGENCY AIRWAY: CPT | Performed by: NURSE PRACTITIONER

## 2020-01-01 PROCEDURE — 93010 ELECTROCARDIOGRAM REPORT: CPT | Performed by: NUCLEAR MEDICINE

## 2020-01-01 PROCEDURE — 94729 DIFFUSING CAPACITY: CPT

## 2020-01-01 PROCEDURE — 99222 1ST HOSP IP/OBS MODERATE 55: CPT | Performed by: FAMILY MEDICINE

## 2020-01-01 PROCEDURE — G8926 SPIRO NO PERF OR DOC: HCPCS | Performed by: NURSE PRACTITIONER

## 2020-01-01 PROCEDURE — 99233 SBSQ HOSP IP/OBS HIGH 50: CPT | Performed by: NURSE PRACTITIONER

## 2020-01-01 PROCEDURE — 36568 INSJ PICC <5 YR W/O IMAGING: CPT

## 2020-01-01 RX ORDER — MODAFINIL 100 MG/1
100 TABLET ORAL DAILY
Status: DISCONTINUED | OUTPATIENT
Start: 2020-01-01 | End: 2020-01-01 | Stop reason: HOSPADM

## 2020-01-01 RX ORDER — SODIUM CHLORIDE 0.9 % (FLUSH) 0.9 %
10 SYRINGE (ML) INJECTION EVERY 12 HOURS SCHEDULED
Status: DISCONTINUED | OUTPATIENT
Start: 2020-01-01 | End: 2020-01-01 | Stop reason: HOSPADM

## 2020-01-01 RX ORDER — LOSARTAN POTASSIUM 50 MG/1
50 TABLET ORAL DAILY
Status: DISCONTINUED | OUTPATIENT
Start: 2020-01-01 | End: 2020-01-01

## 2020-01-01 RX ORDER — SODIUM CHLORIDE 9 MG/ML
INJECTION, SOLUTION INTRAVENOUS CONTINUOUS
Status: DISCONTINUED | OUTPATIENT
Start: 2020-01-01 | End: 2020-01-01

## 2020-01-01 RX ORDER — POLYETHYLENE GLYCOL 3350 17 G/17G
17 POWDER, FOR SOLUTION ORAL DAILY PRN
Status: DISCONTINUED | OUTPATIENT
Start: 2020-01-01 | End: 2020-01-01 | Stop reason: HOSPADM

## 2020-01-01 RX ORDER — BUDESONIDE AND FORMOTEROL FUMARATE DIHYDRATE 80; 4.5 UG/1; UG/1
2 AEROSOL RESPIRATORY (INHALATION) 2 TIMES DAILY
Status: DISCONTINUED | OUTPATIENT
Start: 2020-01-01 | End: 2020-01-01 | Stop reason: HOSPADM

## 2020-01-01 RX ORDER — NICOTINE POLACRILEX 4 MG
15 LOZENGE BUCCAL PRN
Status: DISCONTINUED | OUTPATIENT
Start: 2020-01-01 | End: 2020-01-01 | Stop reason: HOSPADM

## 2020-01-01 RX ORDER — LOSARTAN POTASSIUM AND HYDROCHLOROTHIAZIDE 12.5; 5 MG/1; MG/1
1 TABLET ORAL DAILY
Status: ON HOLD | COMMUNITY
End: 2020-01-01 | Stop reason: HOSPADM

## 2020-01-01 RX ORDER — PROPOFOL 10 MG/ML
10 INJECTION, EMULSION INTRAVENOUS CONTINUOUS
Status: DISCONTINUED | OUTPATIENT
Start: 2020-01-01 | End: 2020-01-01

## 2020-01-01 RX ORDER — SPIRONOLACTONE 25 MG/1
12.5 TABLET ORAL DAILY
Status: DISCONTINUED | OUTPATIENT
Start: 2020-01-01 | End: 2020-01-01 | Stop reason: HOSPADM

## 2020-01-01 RX ORDER — FINASTERIDE 5 MG/1
5 TABLET, FILM COATED ORAL DAILY
Status: DISCONTINUED | OUTPATIENT
Start: 2020-01-01 | End: 2020-01-01 | Stop reason: HOSPADM

## 2020-01-01 RX ORDER — SODIUM CHLORIDE 9 MG/ML
INJECTION, SOLUTION INTRAVENOUS CONTINUOUS
Status: DISCONTINUED | OUTPATIENT
Start: 2020-01-01 | End: 2020-01-01 | Stop reason: SDUPTHER

## 2020-01-01 RX ORDER — METOPROLOL TARTRATE 100 MG/1
100 TABLET ORAL 2 TIMES DAILY
Status: DISCONTINUED | OUTPATIENT
Start: 2020-01-01 | End: 2020-01-01

## 2020-01-01 RX ORDER — BACLOFEN 10 MG/1
10 TABLET ORAL DAILY
Status: DISCONTINUED | OUTPATIENT
Start: 2020-01-01 | End: 2020-01-01 | Stop reason: HOSPADM

## 2020-01-01 RX ORDER — SODIUM CHLORIDE 9 MG/ML
INJECTION, SOLUTION INTRAVENOUS CONTINUOUS
Status: CANCELLED | OUTPATIENT
Start: 2020-01-01

## 2020-01-01 RX ORDER — LIDOCAINE HYDROCHLORIDE 10 MG/ML
5 INJECTION, SOLUTION EPIDURAL; INFILTRATION; INTRACAUDAL; PERINEURAL ONCE
Status: DISCONTINUED | OUTPATIENT
Start: 2020-01-01 | End: 2020-01-01 | Stop reason: HOSPADM

## 2020-01-01 RX ORDER — FUROSEMIDE 40 MG/1
40 TABLET ORAL DAILY
Qty: 60 TABLET | Refills: 3 | Status: SHIPPED | OUTPATIENT
Start: 2020-01-01 | End: 2020-01-01 | Stop reason: SDUPTHER

## 2020-01-01 RX ORDER — POTASSIUM CHLORIDE 20 MEQ/1
40 TABLET, EXTENDED RELEASE ORAL PRN
Status: DISCONTINUED | OUTPATIENT
Start: 2020-01-01 | End: 2020-01-01 | Stop reason: HOSPADM

## 2020-01-01 RX ORDER — METHYLPREDNISOLONE SODIUM SUCCINATE 125 MG/2ML
125 INJECTION, POWDER, LYOPHILIZED, FOR SOLUTION INTRAMUSCULAR; INTRAVENOUS DAILY
Status: DISCONTINUED | OUTPATIENT
Start: 2020-01-01 | End: 2020-01-01

## 2020-01-01 RX ORDER — ALBUTEROL SULFATE 2.5 MG/3ML
2.5 SOLUTION RESPIRATORY (INHALATION) ONCE
Status: COMPLETED | OUTPATIENT
Start: 2020-01-01 | End: 2020-01-01

## 2020-01-01 RX ORDER — EPINEPHRINE 0.1 MG/ML
SYRINGE (ML) INJECTION
Status: COMPLETED | OUTPATIENT
Start: 2020-01-01 | End: 2020-01-01

## 2020-01-01 RX ORDER — ISOSORBIDE MONONITRATE 30 MG/1
30 TABLET, EXTENDED RELEASE ORAL DAILY
Qty: 30 TABLET | Refills: 3 | Status: SHIPPED | OUTPATIENT
Start: 2020-01-01

## 2020-01-01 RX ORDER — CANAGLIFLOZIN 100 MG/1
100 TABLET, FILM COATED ORAL
Qty: 90 TABLET | Refills: 1 | Status: SHIPPED | OUTPATIENT
Start: 2020-01-01

## 2020-01-01 RX ORDER — HYDRALAZINE HYDROCHLORIDE 10 MG/1
10 TABLET, FILM COATED ORAL 2 TIMES DAILY
Qty: 60 TABLET | Refills: 2 | Status: SHIPPED | OUTPATIENT
Start: 2020-01-01

## 2020-01-01 RX ORDER — ONDANSETRON 2 MG/ML
4 INJECTION INTRAMUSCULAR; INTRAVENOUS EVERY 6 HOURS PRN
Status: DISCONTINUED | OUTPATIENT
Start: 2020-01-01 | End: 2020-01-01 | Stop reason: HOSPADM

## 2020-01-01 RX ORDER — MULTIVIT/FOLIC ACID/HERBAL 275 400-200/30
30 LIQUID (ML) ORAL DAILY
Status: DISCONTINUED | OUTPATIENT
Start: 2020-01-01 | End: 2020-01-01 | Stop reason: SDUPTHER

## 2020-01-01 RX ORDER — IPRATROPIUM BROMIDE AND ALBUTEROL SULFATE 2.5; .5 MG/3ML; MG/3ML
1 SOLUTION RESPIRATORY (INHALATION)
Status: DISCONTINUED | OUTPATIENT
Start: 2020-01-01 | End: 2020-01-01

## 2020-01-01 RX ORDER — M-VIT,TX,IRON,MINS/CALC/FOLIC 27MG-0.4MG
1 TABLET ORAL DAILY
Status: DISCONTINUED | OUTPATIENT
Start: 2020-01-01 | End: 2020-01-01 | Stop reason: HOSPADM

## 2020-01-01 RX ORDER — DEXAMETHASONE SODIUM PHOSPHATE 4 MG/ML
6 INJECTION, SOLUTION INTRA-ARTICULAR; INTRALESIONAL; INTRAMUSCULAR; INTRAVENOUS; SOFT TISSUE EVERY 24 HOURS
Status: DISCONTINUED | OUTPATIENT
Start: 2020-01-01 | End: 2020-01-01 | Stop reason: HOSPADM

## 2020-01-01 RX ORDER — 0.9 % SODIUM CHLORIDE 0.9 %
20 INTRAVENOUS SOLUTION INTRAVENOUS ONCE
Status: COMPLETED | OUTPATIENT
Start: 2020-01-01 | End: 2020-01-01

## 2020-01-01 RX ORDER — DICYCLOMINE HCL 20 MG
20 TABLET ORAL EVERY 6 HOURS PRN
Status: DISCONTINUED | OUTPATIENT
Start: 2020-01-01 | End: 2020-01-01 | Stop reason: HOSPADM

## 2020-01-01 RX ORDER — ZOLPIDEM TARTRATE 5 MG/1
10 TABLET ORAL NIGHTLY PRN
Status: DISCONTINUED | OUTPATIENT
Start: 2020-01-01 | End: 2020-01-01 | Stop reason: HOSPADM

## 2020-01-01 RX ORDER — POLYETHYLENE GLYCOL 3350 17 G/17G
17 POWDER, FOR SOLUTION ORAL DAILY PRN
Qty: 527 G | Refills: 1 | Status: SHIPPED | OUTPATIENT
Start: 2020-01-01 | End: 2020-11-12

## 2020-01-01 RX ORDER — 0.9 % SODIUM CHLORIDE 0.9 %
20 INTRAVENOUS SOLUTION INTRAVENOUS ONCE
Status: DISCONTINUED | OUTPATIENT
Start: 2020-01-01 | End: 2020-01-01 | Stop reason: HOSPADM

## 2020-01-01 RX ORDER — MORPHINE SULFATE 2 MG/ML
2 INJECTION, SOLUTION INTRAMUSCULAR; INTRAVENOUS ONCE
Status: COMPLETED | OUTPATIENT
Start: 2020-01-01 | End: 2020-01-01

## 2020-01-01 RX ORDER — POLYETHYLENE GLYCOL 3350 17 G/17G
17 POWDER, FOR SOLUTION ORAL DAILY PRN
Status: CANCELLED | OUTPATIENT
Start: 2020-01-01

## 2020-01-01 RX ORDER — UMECLIDINIUM 62.5 UG/1
AEROSOL, POWDER ORAL
Qty: 30 EACH | Refills: 5 | Status: SHIPPED | OUTPATIENT
Start: 2020-01-01

## 2020-01-01 RX ORDER — DEXAMETHASONE 4 MG/1
6 TABLET ORAL DAILY
Status: CANCELLED | OUTPATIENT
Start: 2020-01-01 | End: 2020-10-31

## 2020-01-01 RX ORDER — PROPOFOL 10 MG/ML
INJECTION, EMULSION INTRAVENOUS
Status: COMPLETED
Start: 2020-01-01 | End: 2020-01-01

## 2020-01-01 RX ORDER — IPRATROPIUM BROMIDE AND ALBUTEROL SULFATE 2.5; .5 MG/3ML; MG/3ML
SOLUTION RESPIRATORY (INHALATION)
Status: DISCONTINUED
Start: 2020-01-01 | End: 2020-01-01

## 2020-01-01 RX ORDER — ASPIRIN 81 MG/1
81 TABLET ORAL DAILY
Status: DISCONTINUED | OUTPATIENT
Start: 2020-01-01 | End: 2020-01-01 | Stop reason: HOSPADM

## 2020-01-01 RX ORDER — SODIUM CHLORIDE 9 MG/ML
INJECTION, SOLUTION INTRAVENOUS CONTINUOUS
Status: DISCONTINUED | OUTPATIENT
Start: 2020-01-01 | End: 2020-01-01 | Stop reason: HOSPADM

## 2020-01-01 RX ORDER — HYDROCHLOROTHIAZIDE 12.5 MG/1
12.5 CAPSULE, GELATIN COATED ORAL DAILY
Status: DISCONTINUED | OUTPATIENT
Start: 2020-01-01 | End: 2020-01-01

## 2020-01-01 RX ORDER — INSULIN GLARGINE 100 [IU]/ML
45 INJECTION, SOLUTION SUBCUTANEOUS NIGHTLY
Status: DISCONTINUED | OUTPATIENT
Start: 2020-01-01 | End: 2020-01-01

## 2020-01-01 RX ORDER — OXYBUTYNIN CHLORIDE 5 MG/1
5 TABLET, EXTENDED RELEASE ORAL NIGHTLY
Qty: 30 TABLET | Refills: 3 | Status: CANCELLED | OUTPATIENT
Start: 2020-01-01

## 2020-01-01 RX ORDER — FUROSEMIDE 10 MG/ML
60 INJECTION INTRAMUSCULAR; INTRAVENOUS ONCE
Status: COMPLETED | OUTPATIENT
Start: 2020-01-01 | End: 2020-01-01

## 2020-01-01 RX ORDER — ACETAMINOPHEN 325 MG/1
650 TABLET ORAL EVERY 4 HOURS PRN
Status: DISCONTINUED | OUTPATIENT
Start: 2020-01-01 | End: 2020-01-01 | Stop reason: HOSPADM

## 2020-01-01 RX ORDER — DEXTROSE MONOHYDRATE 25 G/50ML
12.5 INJECTION, SOLUTION INTRAVENOUS PRN
Status: DISCONTINUED | OUTPATIENT
Start: 2020-01-01 | End: 2020-01-01 | Stop reason: HOSPADM

## 2020-01-01 RX ORDER — ISOSORBIDE MONONITRATE 30 MG/1
30 TABLET, EXTENDED RELEASE ORAL DAILY
Status: DISCONTINUED | OUTPATIENT
Start: 2020-01-01 | End: 2020-01-01 | Stop reason: HOSPADM

## 2020-01-01 RX ORDER — FUROSEMIDE 20 MG/1
20 TABLET ORAL DAILY
Qty: 30 TABLET | Refills: 1 | Status: SHIPPED | OUTPATIENT
Start: 2020-01-01

## 2020-01-01 RX ORDER — DEXTROSE MONOHYDRATE 50 MG/ML
100 INJECTION, SOLUTION INTRAVENOUS PRN
Status: DISCONTINUED | OUTPATIENT
Start: 2020-01-01 | End: 2020-01-01 | Stop reason: SDUPTHER

## 2020-01-01 RX ORDER — ACETAMINOPHEN 325 MG/1
650 TABLET ORAL EVERY 6 HOURS PRN
Status: DISCONTINUED | OUTPATIENT
Start: 2020-01-01 | End: 2020-01-01 | Stop reason: HOSPADM

## 2020-01-01 RX ORDER — 0.9 % SODIUM CHLORIDE 0.9 %
30 INTRAVENOUS SOLUTION INTRAVENOUS PRN
Status: DISCONTINUED | OUTPATIENT
Start: 2020-01-01 | End: 2020-01-01 | Stop reason: HOSPADM

## 2020-01-01 RX ORDER — PROMETHAZINE HYDROCHLORIDE 25 MG/1
12.5 TABLET ORAL EVERY 6 HOURS PRN
Status: DISCONTINUED | OUTPATIENT
Start: 2020-01-01 | End: 2020-01-01 | Stop reason: HOSPADM

## 2020-01-01 RX ORDER — POTASSIUM CHLORIDE 7.45 MG/ML
10 INJECTION INTRAVENOUS PRN
Status: DISCONTINUED | OUTPATIENT
Start: 2020-01-01 | End: 2020-01-01 | Stop reason: HOSPADM

## 2020-01-01 RX ORDER — ROCURONIUM BROMIDE 10 MG/ML
120 INJECTION, SOLUTION INTRAVENOUS ONCE
Status: COMPLETED | OUTPATIENT
Start: 2020-01-01 | End: 2020-01-01

## 2020-01-01 RX ORDER — INSULIN GLARGINE 100 [IU]/ML
20 INJECTION, SOLUTION SUBCUTANEOUS NIGHTLY
Status: DISCONTINUED | OUTPATIENT
Start: 2020-01-01 | End: 2020-01-01

## 2020-01-01 RX ORDER — PROPOFOL 10 MG/ML
50 INJECTION, EMULSION INTRAVENOUS ONCE
Status: DISCONTINUED | OUTPATIENT
Start: 2020-01-01 | End: 2020-01-01

## 2020-01-01 RX ORDER — DEXTROSE MONOHYDRATE 25 G/50ML
12.5 INJECTION, SOLUTION INTRAVENOUS PRN
Status: DISCONTINUED | OUTPATIENT
Start: 2020-01-01 | End: 2020-01-01 | Stop reason: SDUPTHER

## 2020-01-01 RX ORDER — PROPOFOL 10 MG/ML
INJECTION, EMULSION INTRAVENOUS CONTINUOUS PRN
Status: COMPLETED | OUTPATIENT
Start: 2020-01-01 | End: 2020-01-01

## 2020-01-01 RX ORDER — HYDRALAZINE HYDROCHLORIDE 20 MG/ML
5 INJECTION INTRAMUSCULAR; INTRAVENOUS ONCE
Status: COMPLETED | OUTPATIENT
Start: 2020-01-01 | End: 2020-01-01

## 2020-01-01 RX ORDER — NITROGLYCERIN 0.4 MG/1
0.4 TABLET SUBLINGUAL EVERY 5 MIN PRN
Status: CANCELLED | OUTPATIENT
Start: 2020-01-01

## 2020-01-01 RX ORDER — METOPROLOL SUCCINATE 50 MG/1
50 TABLET, EXTENDED RELEASE ORAL ONCE
Status: COMPLETED | OUTPATIENT
Start: 2020-01-01 | End: 2020-01-01

## 2020-01-01 RX ORDER — ASPIRIN 325 MG
325 TABLET ORAL ONCE
Status: DISCONTINUED | OUTPATIENT
Start: 2020-01-01 | End: 2020-01-01 | Stop reason: HOSPADM

## 2020-01-01 RX ORDER — METOPROLOL TARTRATE 5 MG/5ML
5 INJECTION INTRAVENOUS EVERY 6 HOURS PRN
Status: DISCONTINUED | OUTPATIENT
Start: 2020-01-01 | End: 2020-01-01

## 2020-01-01 RX ORDER — KETAMINE HCL IN NACL, ISO-OSM 100MG/10ML
SYRINGE (ML) INJECTION
Status: DISPENSED
Start: 2020-01-01 | End: 2020-01-01

## 2020-01-01 RX ORDER — FUROSEMIDE 20 MG/1
20 TABLET ORAL DAILY
Status: DISCONTINUED | OUTPATIENT
Start: 2020-01-01 | End: 2020-01-01 | Stop reason: HOSPADM

## 2020-01-01 RX ORDER — PREDNISONE 20 MG/1
20 TABLET ORAL 2 TIMES DAILY
Qty: 10 TABLET | Refills: 0 | Status: SHIPPED | OUTPATIENT
Start: 2020-01-01 | End: 2020-01-01

## 2020-01-01 RX ORDER — KETAMINE HCL IN NACL, ISO-OSM 100MG/10ML
SYRINGE (ML) INJECTION
Status: COMPLETED | OUTPATIENT
Start: 2020-01-01 | End: 2020-01-01

## 2020-01-01 RX ORDER — INSULIN GLARGINE 100 [IU]/ML
55 INJECTION, SOLUTION SUBCUTANEOUS NIGHTLY
Status: DISCONTINUED | OUTPATIENT
Start: 2020-01-01 | End: 2020-01-01 | Stop reason: HOSPADM

## 2020-01-01 RX ORDER — ASPIRIN 325 MG
325 TABLET ORAL ONCE
Status: CANCELLED | OUTPATIENT
Start: 2020-01-01 | End: 2020-01-01

## 2020-01-01 RX ORDER — PROPOFOL 10 MG/ML
20 INJECTION, EMULSION INTRAVENOUS CONTINUOUS
Status: DISCONTINUED | OUTPATIENT
Start: 2020-01-01 | End: 2020-01-01

## 2020-01-01 RX ORDER — SCOLOPAMINE TRANSDERMAL SYSTEM 1 MG/1
1 PATCH, EXTENDED RELEASE TRANSDERMAL
Status: DISCONTINUED | OUTPATIENT
Start: 2020-01-01 | End: 2020-01-01

## 2020-01-01 RX ORDER — SODIUM CHLORIDE 0.9 % (FLUSH) 0.9 %
10 SYRINGE (ML) INJECTION PRN
Status: DISCONTINUED | OUTPATIENT
Start: 2020-01-01 | End: 2020-01-01 | Stop reason: HOSPADM

## 2020-01-01 RX ORDER — ATORVASTATIN CALCIUM 10 MG/1
10 TABLET, FILM COATED ORAL 3 TIMES DAILY
Status: DISCONTINUED | OUTPATIENT
Start: 2020-01-01 | End: 2020-01-01 | Stop reason: HOSPADM

## 2020-01-01 RX ORDER — FUROSEMIDE 10 MG/ML
60 INJECTION INTRAMUSCULAR; INTRAVENOUS 2 TIMES DAILY
Status: DISCONTINUED | OUTPATIENT
Start: 2020-01-01 | End: 2020-01-01 | Stop reason: HOSPADM

## 2020-01-01 RX ORDER — ACETAMINOPHEN 650 MG/1
650 SUPPOSITORY RECTAL EVERY 6 HOURS PRN
Status: DISCONTINUED | OUTPATIENT
Start: 2020-01-01 | End: 2020-01-01 | Stop reason: HOSPADM

## 2020-01-01 RX ORDER — PREGABALIN 75 MG/1
75 CAPSULE ORAL 2 TIMES DAILY
Status: DISCONTINUED | OUTPATIENT
Start: 2020-01-01 | End: 2020-01-01 | Stop reason: HOSPADM

## 2020-01-01 RX ORDER — INSULIN GLARGINE 100 [IU]/ML
20 INJECTION, SOLUTION SUBCUTANEOUS ONCE
Status: COMPLETED | OUTPATIENT
Start: 2020-01-01 | End: 2020-01-01

## 2020-01-01 RX ORDER — FENTANYL CITRATE 50 UG/ML
25 INJECTION, SOLUTION INTRAMUSCULAR; INTRAVENOUS
Status: DISCONTINUED | OUTPATIENT
Start: 2020-01-01 | End: 2020-01-01

## 2020-01-01 RX ORDER — SODIUM CHLORIDE 9 MG/ML
1000 INJECTION, SOLUTION INTRAVENOUS CONTINUOUS
Status: DISCONTINUED | OUTPATIENT
Start: 2020-01-01 | End: 2020-01-01

## 2020-01-01 RX ORDER — POTASSIUM CHLORIDE 20 MEQ/1
40 TABLET, EXTENDED RELEASE ORAL ONCE
Status: COMPLETED | OUTPATIENT
Start: 2020-01-01 | End: 2020-01-01

## 2020-01-01 RX ORDER — 0.9 % SODIUM CHLORIDE 0.9 %
1000 INTRAVENOUS SOLUTION INTRAVENOUS ONCE
Status: COMPLETED | OUTPATIENT
Start: 2020-01-01 | End: 2020-01-01

## 2020-01-01 RX ORDER — INSULIN GLARGINE 100 [IU]/ML
60 INJECTION, SOLUTION SUBCUTANEOUS 2 TIMES DAILY
Status: DISCONTINUED | OUTPATIENT
Start: 2020-01-01 | End: 2020-01-01 | Stop reason: HOSPADM

## 2020-01-01 RX ORDER — BENZONATATE 100 MG/1
100 CAPSULE ORAL 3 TIMES DAILY PRN
Status: DISCONTINUED | OUTPATIENT
Start: 2020-01-01 | End: 2020-01-01 | Stop reason: HOSPADM

## 2020-01-01 RX ORDER — BUSPIRONE HYDROCHLORIDE 5 MG/1
5 TABLET ORAL 3 TIMES DAILY
Status: DISCONTINUED | OUTPATIENT
Start: 2020-01-01 | End: 2020-01-01 | Stop reason: HOSPADM

## 2020-01-01 RX ORDER — ALBUTEROL SULFATE 2.5 MG/3ML
2.5 SOLUTION RESPIRATORY (INHALATION) EVERY 4 HOURS PRN
Status: DISCONTINUED | OUTPATIENT
Start: 2020-01-01 | End: 2020-01-01 | Stop reason: HOSPADM

## 2020-01-01 RX ORDER — METOPROLOL SUCCINATE 100 MG/1
100 TABLET, EXTENDED RELEASE ORAL 2 TIMES DAILY
Status: DISCONTINUED | OUTPATIENT
Start: 2020-01-01 | End: 2020-01-01

## 2020-01-01 RX ORDER — MORPHINE SULFATE 2 MG/ML
INJECTION, SOLUTION INTRAMUSCULAR; INTRAVENOUS
Status: COMPLETED
Start: 2020-01-01 | End: 2020-01-01

## 2020-01-01 RX ORDER — SPIRONOLACTONE 25 MG/1
25 TABLET ORAL DAILY
Status: DISCONTINUED | OUTPATIENT
Start: 2020-01-01 | End: 2020-01-01 | Stop reason: HOSPADM

## 2020-01-01 RX ORDER — BISACODYL 10 MG
10 SUPPOSITORY, RECTAL RECTAL DAILY
Status: DISCONTINUED | OUTPATIENT
Start: 2020-01-01 | End: 2020-01-01 | Stop reason: HOSPADM

## 2020-01-01 RX ORDER — NITROGLYCERIN 0.4 MG/1
0.4 TABLET SUBLINGUAL EVERY 5 MIN PRN
Status: DISCONTINUED | OUTPATIENT
Start: 2020-01-01 | End: 2020-01-01 | Stop reason: HOSPADM

## 2020-01-01 RX ORDER — FUROSEMIDE 10 MG/ML
40 INJECTION INTRAMUSCULAR; INTRAVENOUS ONCE
Status: COMPLETED | OUTPATIENT
Start: 2020-01-01 | End: 2020-01-01

## 2020-01-01 RX ORDER — METOPROLOL SUCCINATE 50 MG/1
150 TABLET, EXTENDED RELEASE ORAL 2 TIMES DAILY
Qty: 30 TABLET | Refills: 3 | Status: SHIPPED | OUTPATIENT
Start: 2020-01-01

## 2020-01-01 RX ORDER — ATROPINE SULFATE 0.4 MG/ML
0.5 AMPUL (ML) INJECTION
Status: DISCONTINUED | OUTPATIENT
Start: 2020-01-01 | End: 2020-01-01 | Stop reason: HOSPADM

## 2020-01-01 RX ORDER — HYDRALAZINE HYDROCHLORIDE 10 MG/1
10 TABLET, FILM COATED ORAL EVERY 8 HOURS
Status: DISCONTINUED | OUTPATIENT
Start: 2020-01-01 | End: 2020-01-01 | Stop reason: HOSPADM

## 2020-01-01 RX ORDER — SPIRONOLACTONE 25 MG/1
12.5 TABLET ORAL DAILY
Qty: 30 TABLET | Refills: 3 | Status: SHIPPED | OUTPATIENT
Start: 2020-01-01

## 2020-01-01 RX ORDER — LOPERAMIDE HYDROCHLORIDE 2 MG/1
2 CAPSULE ORAL 4 TIMES DAILY PRN
Status: DISCONTINUED | OUTPATIENT
Start: 2020-01-01 | End: 2020-01-01 | Stop reason: HOSPADM

## 2020-01-01 RX ORDER — METOPROLOL TARTRATE 50 MG/1
50 TABLET, FILM COATED ORAL 2 TIMES DAILY
Status: DISCONTINUED | OUTPATIENT
Start: 2020-01-01 | End: 2020-01-01

## 2020-01-01 RX ORDER — ZINC SULFATE 50(220)MG
50 CAPSULE ORAL DAILY
Status: DISCONTINUED | OUTPATIENT
Start: 2020-01-01 | End: 2020-01-01 | Stop reason: HOSPADM

## 2020-01-01 RX ORDER — ALBUTEROL SULFATE 90 UG/1
2 AEROSOL, METERED RESPIRATORY (INHALATION) EVERY 6 HOURS PRN
Status: DISCONTINUED | OUTPATIENT
Start: 2020-01-01 | End: 2020-01-01 | Stop reason: HOSPADM

## 2020-01-01 RX ORDER — FAMOTIDINE 20 MG/1
20 TABLET, FILM COATED ORAL 2 TIMES DAILY
Status: DISCONTINUED | OUTPATIENT
Start: 2020-01-01 | End: 2020-01-01 | Stop reason: HOSPADM

## 2020-01-01 RX ORDER — ZOLPIDEM TARTRATE 5 MG/1
5 TABLET ORAL NIGHTLY PRN
Status: DISCONTINUED | OUTPATIENT
Start: 2020-01-01 | End: 2020-01-01

## 2020-01-01 RX ORDER — DIPHENHYDRAMINE HYDROCHLORIDE 50 MG/ML
25 INJECTION INTRAMUSCULAR; INTRAVENOUS ONCE
Status: COMPLETED | OUTPATIENT
Start: 2020-01-01 | End: 2020-01-01

## 2020-01-01 RX ORDER — INSULIN GLARGINE 100 [IU]/ML
50 INJECTION, SOLUTION SUBCUTANEOUS 2 TIMES DAILY
Status: DISCONTINUED | OUTPATIENT
Start: 2020-01-01 | End: 2020-01-01

## 2020-01-01 RX ORDER — SODIUM CHLORIDE 0.9 % (FLUSH) 0.9 %
10 SYRINGE (ML) INJECTION EVERY 12 HOURS SCHEDULED
Status: CANCELLED | OUTPATIENT
Start: 2020-01-01

## 2020-01-01 RX ORDER — SODIUM CHLORIDE 0.9 % (FLUSH) 0.9 %
10 SYRINGE (ML) INJECTION PRN
Status: DISCONTINUED | OUTPATIENT
Start: 2020-01-01 | End: 2020-01-01 | Stop reason: SDUPTHER

## 2020-01-01 RX ORDER — INSULIN GLARGINE 100 [IU]/ML
23 INJECTION, SOLUTION SUBCUTANEOUS NIGHTLY
Status: DISCONTINUED | OUTPATIENT
Start: 2020-01-01 | End: 2020-01-01

## 2020-01-01 RX ORDER — VITAMIN B COMPLEX
1000 TABLET ORAL DAILY
Status: DISCONTINUED | OUTPATIENT
Start: 2020-01-01 | End: 2020-01-01 | Stop reason: HOSPADM

## 2020-01-01 RX ORDER — FENTANYL CITRATE 50 UG/ML
INJECTION, SOLUTION INTRAMUSCULAR; INTRAVENOUS
Status: COMPLETED
Start: 2020-01-01 | End: 2020-01-01

## 2020-01-01 RX ORDER — HYDRALAZINE HYDROCHLORIDE 10 MG/1
10 TABLET, FILM COATED ORAL 2 TIMES DAILY
Status: DISCONTINUED | OUTPATIENT
Start: 2020-01-01 | End: 2020-01-01 | Stop reason: HOSPADM

## 2020-01-01 RX ORDER — OXYBUTYNIN CHLORIDE 5 MG/1
5 TABLET, EXTENDED RELEASE ORAL NIGHTLY
Status: DISCONTINUED | OUTPATIENT
Start: 2020-01-01 | End: 2020-01-01 | Stop reason: HOSPADM

## 2020-01-01 RX ORDER — DEXTROSE MONOHYDRATE 50 MG/ML
100 INJECTION, SOLUTION INTRAVENOUS PRN
Status: DISCONTINUED | OUTPATIENT
Start: 2020-01-01 | End: 2020-01-01 | Stop reason: HOSPADM

## 2020-01-01 RX ORDER — DICYCLOMINE HCL 20 MG
20 TABLET ORAL EVERY 6 HOURS PRN
Qty: 120 TABLET | Refills: 3 | Status: SHIPPED | OUTPATIENT
Start: 2020-01-01

## 2020-01-01 RX ORDER — NICOTINE POLACRILEX 4 MG
15 LOZENGE BUCCAL PRN
Status: DISCONTINUED | OUTPATIENT
Start: 2020-01-01 | End: 2020-01-01 | Stop reason: SDUPTHER

## 2020-01-01 RX ORDER — UMECLIDINIUM 62.5 UG/1
AEROSOL, POWDER ORAL
Qty: 30 EACH | Refills: 5 | Status: SHIPPED | OUTPATIENT
Start: 2020-01-01 | End: 2020-01-01 | Stop reason: SDUPTHER

## 2020-01-01 RX ORDER — SODIUM CHLORIDE 0.9 % (FLUSH) 0.9 %
10 SYRINGE (ML) INJECTION EVERY 12 HOURS SCHEDULED
Status: DISCONTINUED | OUTPATIENT
Start: 2020-01-01 | End: 2020-01-01 | Stop reason: SDUPTHER

## 2020-01-01 RX ORDER — INSULIN GLARGINE 100 [IU]/ML
60 INJECTION, SOLUTION SUBCUTANEOUS 2 TIMES DAILY
Status: DISCONTINUED | OUTPATIENT
Start: 2020-01-01 | End: 2020-01-01

## 2020-01-01 RX ORDER — SODIUM CHLORIDE 0.9 % (FLUSH) 0.9 %
10 SYRINGE (ML) INJECTION EVERY 12 HOURS SCHEDULED
Status: DISCONTINUED | OUTPATIENT
Start: 2020-01-01 | End: 2020-01-01

## 2020-01-01 RX ORDER — KETAMINE HCL IN NACL, ISO-OSM 100MG/10ML
100 SYRINGE (ML) INJECTION ONCE
Status: DISCONTINUED | OUTPATIENT
Start: 2020-01-01 | End: 2020-01-01

## 2020-01-01 RX ORDER — INSULIN GLARGINE 100 [IU]/ML
60 INJECTION, SOLUTION SUBCUTANEOUS EVERY MORNING
Status: DISCONTINUED | OUTPATIENT
Start: 2020-01-01 | End: 2020-01-01 | Stop reason: HOSPADM

## 2020-01-01 RX ORDER — DEXAMETHASONE SODIUM PHOSPHATE 4 MG/ML
4 INJECTION, SOLUTION INTRA-ARTICULAR; INTRALESIONAL; INTRAMUSCULAR; INTRAVENOUS; SOFT TISSUE DAILY
Status: COMPLETED | OUTPATIENT
Start: 2020-01-01 | End: 2020-01-01

## 2020-01-01 RX ORDER — SODIUM CHLORIDE 450 MG/100ML
INJECTION, SOLUTION INTRAVENOUS CONTINUOUS
Status: DISCONTINUED | OUTPATIENT
Start: 2020-01-01 | End: 2020-01-01

## 2020-01-01 RX ORDER — MORPHINE SULFATE 4 MG/ML
4 INJECTION, SOLUTION INTRAMUSCULAR; INTRAVENOUS ONCE
Status: DISCONTINUED | OUTPATIENT
Start: 2020-01-01 | End: 2020-01-01

## 2020-01-01 RX ORDER — FENTANYL CITRATE 50 UG/ML
50 INJECTION, SOLUTION INTRAMUSCULAR; INTRAVENOUS ONCE
Status: COMPLETED | OUTPATIENT
Start: 2020-01-01 | End: 2020-01-01

## 2020-01-01 RX ORDER — PANTOPRAZOLE SODIUM 40 MG/1
40 TABLET, DELAYED RELEASE ORAL
Status: DISCONTINUED | OUTPATIENT
Start: 2020-01-01 | End: 2020-01-01 | Stop reason: HOSPADM

## 2020-01-01 RX ORDER — M-VIT,TX,IRON,MINS/CALC/FOLIC 27MG-0.4MG
1 TABLET ORAL DAILY
Status: DISCONTINUED | OUTPATIENT
Start: 2020-01-01 | End: 2020-01-01

## 2020-01-01 RX ORDER — METOCLOPRAMIDE HYDROCHLORIDE 5 MG/ML
10 INJECTION INTRAMUSCULAR; INTRAVENOUS ONCE
Status: COMPLETED | OUTPATIENT
Start: 2020-01-01 | End: 2020-01-01

## 2020-01-01 RX ORDER — LACTOBACILLUS RHAMNOSUS GG 10B CELL
1 CAPSULE ORAL
Status: DISCONTINUED | OUTPATIENT
Start: 2020-01-01 | End: 2020-01-01 | Stop reason: HOSPADM

## 2020-01-01 RX ORDER — ASCORBIC ACID 500 MG
1000 TABLET ORAL DAILY
Status: DISCONTINUED | OUTPATIENT
Start: 2020-01-01 | End: 2020-01-01 | Stop reason: HOSPADM

## 2020-01-01 RX ORDER — LOSARTAN POTASSIUM AND HYDROCHLOROTHIAZIDE 12.5; 5 MG/1; MG/1
1 TABLET ORAL DAILY
Status: DISCONTINUED | OUTPATIENT
Start: 2020-01-01 | End: 2020-01-01 | Stop reason: SDUPTHER

## 2020-01-01 RX ORDER — INSULIN GLARGINE 100 [IU]/ML
35 INJECTION, SOLUTION SUBCUTANEOUS NIGHTLY
Status: DISCONTINUED | OUTPATIENT
Start: 2020-01-01 | End: 2020-01-01

## 2020-01-01 RX ORDER — PROPOFOL 10 MG/ML
20 INJECTION, EMULSION INTRAVENOUS ONCE
Status: DISCONTINUED | OUTPATIENT
Start: 2020-01-01 | End: 2020-01-01

## 2020-01-01 RX ORDER — CHLORHEXIDINE GLUCONATE 0.12 MG/ML
15 RINSE ORAL 2 TIMES DAILY
Status: DISCONTINUED | OUTPATIENT
Start: 2020-01-01 | End: 2020-01-01

## 2020-01-01 RX ORDER — ALBUTEROL SULFATE 2.5 MG/3ML
5 SOLUTION RESPIRATORY (INHALATION) ONCE
Status: COMPLETED | OUTPATIENT
Start: 2020-01-01 | End: 2020-01-01

## 2020-01-01 RX ORDER — BUDESONIDE AND FORMOTEROL FUMARATE DIHYDRATE 160; 4.5 UG/1; UG/1
2 AEROSOL RESPIRATORY (INHALATION) 2 TIMES DAILY
Status: DISCONTINUED | OUTPATIENT
Start: 2020-01-01 | End: 2020-01-01 | Stop reason: HOSPADM

## 2020-01-01 RX ORDER — IPRATROPIUM BROMIDE AND ALBUTEROL SULFATE 2.5; .5 MG/3ML; MG/3ML
1 SOLUTION RESPIRATORY (INHALATION) ONCE
Status: COMPLETED | OUTPATIENT
Start: 2020-01-01 | End: 2020-01-01

## 2020-01-01 RX ORDER — CLONIDINE HYDROCHLORIDE 0.1 MG/1
0.1 TABLET ORAL ONCE
Status: DISCONTINUED | OUTPATIENT
Start: 2020-01-01 | End: 2020-01-01 | Stop reason: HOSPADM

## 2020-01-01 RX ORDER — METHYLPREDNISOLONE SODIUM SUCCINATE 125 MG/2ML
INJECTION, POWDER, LYOPHILIZED, FOR SOLUTION INTRAMUSCULAR; INTRAVENOUS
Status: COMPLETED
Start: 2020-01-01 | End: 2020-01-01

## 2020-01-01 RX ORDER — TAMSULOSIN HYDROCHLORIDE 0.4 MG/1
0.4 CAPSULE ORAL DAILY
Status: DISCONTINUED | OUTPATIENT
Start: 2020-01-01 | End: 2020-01-01 | Stop reason: HOSPADM

## 2020-01-01 RX ORDER — METOPROLOL TARTRATE 5 MG/5ML
INJECTION INTRAVENOUS
Status: COMPLETED
Start: 2020-01-01 | End: 2020-01-01

## 2020-01-01 RX ORDER — FUROSEMIDE 10 MG/ML
INJECTION INTRAMUSCULAR; INTRAVENOUS
Status: COMPLETED
Start: 2020-01-01 | End: 2020-01-01

## 2020-01-01 RX ORDER — SODIUM CHLORIDE 0.9 % (FLUSH) 0.9 %
10 SYRINGE (ML) INJECTION PRN
Status: DISCONTINUED | OUTPATIENT
Start: 2020-01-01 | End: 2020-01-01

## 2020-01-01 RX ORDER — ETOMIDATE 2 MG/ML
20 INJECTION INTRAVENOUS ONCE
Status: COMPLETED | OUTPATIENT
Start: 2020-01-01 | End: 2020-01-01

## 2020-01-01 RX ORDER — PROPOFOL 10 MG/ML
10 INJECTION, EMULSION INTRAVENOUS
Status: DISCONTINUED | OUTPATIENT
Start: 2020-01-01 | End: 2020-01-01

## 2020-01-01 RX ORDER — SIMETHICONE 80 MG
80 TABLET,CHEWABLE ORAL 4 TIMES DAILY
Status: DISCONTINUED | OUTPATIENT
Start: 2020-01-01 | End: 2020-01-01 | Stop reason: HOSPADM

## 2020-01-01 RX ORDER — METOPROLOL TARTRATE 5 MG/5ML
5 INJECTION INTRAVENOUS ONCE
Status: COMPLETED | OUTPATIENT
Start: 2020-01-01 | End: 2020-01-01

## 2020-01-01 RX ORDER — SODIUM CHLORIDE 0.9 % (FLUSH) 0.9 %
10 SYRINGE (ML) INJECTION PRN
Status: CANCELLED | OUTPATIENT
Start: 2020-01-01

## 2020-01-01 RX ORDER — MULTIVIT/FOLIC ACID/HERBAL 275 400-200/30
30 LIQUID (ML) ORAL DAILY
Status: DISCONTINUED | OUTPATIENT
Start: 2020-01-01 | End: 2020-01-01 | Stop reason: RX

## 2020-01-01 RX ORDER — HYDROCODONE POLISTIREX AND CHLORPHENIRAMINE POLISTIREX 10; 8 MG/5ML; MG/5ML
5 SUSPENSION, EXTENDED RELEASE ORAL NIGHTLY
Status: COMPLETED | OUTPATIENT
Start: 2020-01-01 | End: 2020-01-01

## 2020-01-01 RX ADMIN — PSYLLIUM HUSK 1 PACKET: 3.4 GRANULE ORAL at 08:24

## 2020-01-01 RX ADMIN — BISACODYL 10 MG: 10 SUPPOSITORY RECTAL at 09:26

## 2020-01-01 RX ADMIN — PREGABALIN 75 MG: 75 CAPSULE ORAL at 20:36

## 2020-01-01 RX ADMIN — PREGABALIN 75 MG: 75 CAPSULE ORAL at 09:25

## 2020-01-01 RX ADMIN — PANTOPRAZOLE SODIUM 40 MG: 40 TABLET, DELAYED RELEASE ORAL at 08:09

## 2020-01-01 RX ADMIN — PIPERACILLIN AND TAZOBACTAM 3.38 G: 3; .375 INJECTION, POWDER, LYOPHILIZED, FOR SOLUTION INTRAVENOUS at 17:14

## 2020-01-01 RX ADMIN — ENOXAPARIN SODIUM 40 MG: 40 INJECTION SUBCUTANEOUS at 20:49

## 2020-01-01 RX ADMIN — FUROSEMIDE 20 MG: 20 TABLET ORAL at 08:08

## 2020-01-01 RX ADMIN — SACUBITRIL AND VALSARTAN 1 TABLET: 49; 51 TABLET, FILM COATED ORAL at 08:39

## 2020-01-01 RX ADMIN — ISOSORBIDE MONONITRATE 30 MG: 30 TABLET ORAL at 08:08

## 2020-01-01 RX ADMIN — Medication 1 MG: at 03:50

## 2020-01-01 RX ADMIN — ATORVASTATIN CALCIUM 10 MG: 10 TABLET, FILM COATED ORAL at 23:28

## 2020-01-01 RX ADMIN — Medication 1 CAPSULE: at 08:58

## 2020-01-01 RX ADMIN — DEXMEDETOMIDINE 1.2 MCG/KG/HR: 100 INJECTION, SOLUTION, CONCENTRATE INTRAVENOUS at 12:23

## 2020-01-01 RX ADMIN — PROPOFOL 45 MCG/KG/MIN: 10 INJECTION, EMULSION INTRAVENOUS at 01:58

## 2020-01-01 RX ADMIN — SIMETHICONE 80 MG: 80 TABLET, CHEWABLE ORAL at 20:35

## 2020-01-01 RX ADMIN — INSULIN LISPRO 3 UNITS: 100 INJECTION, SOLUTION INTRAVENOUS; SUBCUTANEOUS at 00:05

## 2020-01-01 RX ADMIN — INSULIN LISPRO 12 UNITS: 100 INJECTION, SOLUTION INTRAVENOUS; SUBCUTANEOUS at 10:15

## 2020-01-01 RX ADMIN — ASPIRIN 81 MG: 81 TABLET ORAL at 08:35

## 2020-01-01 RX ADMIN — INSULIN GLARGINE 50 UNITS: 100 INJECTION, SOLUTION SUBCUTANEOUS at 12:43

## 2020-01-01 RX ADMIN — POTASSIUM CHLORIDE 10 MEQ: 7.46 INJECTION, SOLUTION INTRAVENOUS at 08:17

## 2020-01-01 RX ADMIN — INSULIN LISPRO 1 UNITS: 100 INJECTION, SOLUTION INTRAVENOUS; SUBCUTANEOUS at 17:44

## 2020-01-01 RX ADMIN — METOPROLOL TARTRATE 50 MG: 100 TABLET, FILM COATED ORAL at 08:17

## 2020-01-01 RX ADMIN — BUSPIRONE HYDROCHLORIDE 5 MG: 5 TABLET ORAL at 14:26

## 2020-01-01 RX ADMIN — ROCURONIUM BROMIDE 100 MG: 10 INJECTION, SOLUTION INTRAVENOUS at 18:33

## 2020-01-01 RX ADMIN — METOPROLOL SUCCINATE 100 MG: 100 TABLET, FILM COATED, EXTENDED RELEASE ORAL at 08:27

## 2020-01-01 RX ADMIN — Medication 1000 UNITS: at 08:36

## 2020-01-01 RX ADMIN — PREGABALIN 75 MG: 75 CAPSULE ORAL at 08:11

## 2020-01-01 RX ADMIN — FENTANYL CITRATE 25 MCG: 50 INJECTION, SOLUTION INTRAMUSCULAR; INTRAVENOUS at 14:11

## 2020-01-01 RX ADMIN — ALBUTEROL SULFATE 5 MG: 2.5 SOLUTION RESPIRATORY (INHALATION) at 13:00

## 2020-01-01 RX ADMIN — PROPOFOL 45 MCG/KG/MIN: 10 INJECTION, EMULSION INTRAVENOUS at 05:08

## 2020-01-01 RX ADMIN — METOPROLOL SUCCINATE 150 MG: 100 TABLET, FILM COATED, EXTENDED RELEASE ORAL at 08:11

## 2020-01-01 RX ADMIN — FAMOTIDINE 20 MG: 20 TABLET, FILM COATED ORAL at 22:08

## 2020-01-01 RX ADMIN — Medication 50 MG: at 08:34

## 2020-01-01 RX ADMIN — FUROSEMIDE 60 MG: 10 INJECTION, SOLUTION INTRAMUSCULAR; INTRAVENOUS at 08:49

## 2020-01-01 RX ADMIN — FINASTERIDE 5 MG: 5 TABLET, FILM COATED ORAL at 08:09

## 2020-01-01 RX ADMIN — Medication 1 CAPSULE: at 09:15

## 2020-01-01 RX ADMIN — ENOXAPARIN SODIUM 40 MG: 40 INJECTION SUBCUTANEOUS at 08:24

## 2020-01-01 RX ADMIN — CEFEPIME 2 G: 2 INJECTION, POWDER, FOR SOLUTION INTRAVENOUS at 09:25

## 2020-01-01 RX ADMIN — INSULIN GLARGINE 23 UNITS: 100 INJECTION, SOLUTION SUBCUTANEOUS at 21:43

## 2020-01-01 RX ADMIN — METOPROLOL SUCCINATE 100 MG: 100 TABLET, FILM COATED, EXTENDED RELEASE ORAL at 22:34

## 2020-01-01 RX ADMIN — PROPOFOL 30 MCG/KG/MIN: 10 INJECTION, EMULSION INTRAVENOUS at 04:51

## 2020-01-01 RX ADMIN — ISOSORBIDE MONONITRATE 30 MG: 30 TABLET ORAL at 08:35

## 2020-01-01 RX ADMIN — ALBUTEROL SULFATE 2 PUFF: 90 AEROSOL, METERED RESPIRATORY (INHALATION) at 09:47

## 2020-01-01 RX ADMIN — ENOXAPARIN SODIUM 40 MG: 40 INJECTION SUBCUTANEOUS at 20:29

## 2020-01-01 RX ADMIN — PSYLLIUM HUSK 1 PACKET: 3.4 GRANULE ORAL at 08:39

## 2020-01-01 RX ADMIN — Medication 5 ML: at 21:41

## 2020-01-01 RX ADMIN — TAMSULOSIN HYDROCHLORIDE 0.4 MG: 0.4 CAPSULE ORAL at 08:08

## 2020-01-01 RX ADMIN — Medication 1 MG: at 03:44

## 2020-01-01 RX ADMIN — BUDESONIDE AND FORMOTEROL FUMARATE DIHYDRATE 2 PUFF: 160; 4.5 AEROSOL RESPIRATORY (INHALATION) at 09:46

## 2020-01-01 RX ADMIN — FINASTERIDE 5 MG: 5 TABLET, FILM COATED ORAL at 08:03

## 2020-01-01 RX ADMIN — POLYETHYLENE GLYCOL 3350 17 G: 17 POWDER, FOR SOLUTION ORAL at 09:26

## 2020-01-01 RX ADMIN — SIMETHICONE 80 MG: 80 TABLET, CHEWABLE ORAL at 17:12

## 2020-01-01 RX ADMIN — OXYCODONE HYDROCHLORIDE AND ACETAMINOPHEN 1000 MG: 500 TABLET ORAL at 08:35

## 2020-01-01 RX ADMIN — MULTIPLE VITAMINS W/ MINERALS TAB 1 TABLET: TAB at 08:25

## 2020-01-01 RX ADMIN — PROPOFOL 50 MCG/KG/MIN: 10 INJECTION, EMULSION INTRAVENOUS at 22:02

## 2020-01-01 RX ADMIN — FUROSEMIDE 20 MG: 20 TABLET ORAL at 08:33

## 2020-01-01 RX ADMIN — BUSPIRONE HYDROCHLORIDE 5 MG: 5 TABLET ORAL at 14:36

## 2020-01-01 RX ADMIN — INSULIN LISPRO 12 UNITS: 100 INJECTION, SOLUTION INTRAVENOUS; SUBCUTANEOUS at 15:41

## 2020-01-01 RX ADMIN — SACUBITRIL AND VALSARTAN 1 TABLET: 49; 51 TABLET, FILM COATED ORAL at 08:24

## 2020-01-01 RX ADMIN — HYDRALAZINE HYDROCHLORIDE 10 MG: 10 TABLET, FILM COATED ORAL at 21:50

## 2020-01-01 RX ADMIN — SODIUM CHLORIDE, PRESERVATIVE FREE 10 ML: 5 INJECTION INTRAVENOUS at 09:08

## 2020-01-01 RX ADMIN — CALCIUM GLUCONATE 1 G: 94 INJECTION, SOLUTION INTRAVENOUS at 03:43

## 2020-01-01 RX ADMIN — DEXMEDETOMIDINE 0.7 MCG/KG/HR: 100 INJECTION, SOLUTION, CONCENTRATE INTRAVENOUS at 04:10

## 2020-01-01 RX ADMIN — FAMOTIDINE 20 MG: 20 TABLET, FILM COATED ORAL at 21:07

## 2020-01-01 RX ADMIN — PROPOFOL 40 MCG/KG/MIN: 10 INJECTION, EMULSION INTRAVENOUS at 02:34

## 2020-01-01 RX ADMIN — PROPOFOL 40 MCG/KG/MIN: 10 INJECTION, EMULSION INTRAVENOUS at 14:00

## 2020-01-01 RX ADMIN — ALBUTEROL SULFATE 2.5 MG: 2.5 SOLUTION RESPIRATORY (INHALATION) at 19:20

## 2020-01-01 RX ADMIN — Medication 15 ML: at 20:36

## 2020-01-01 RX ADMIN — INSULIN GLARGINE 60 UNITS: 100 INJECTION, SOLUTION SUBCUTANEOUS at 08:24

## 2020-01-01 RX ADMIN — ATORVASTATIN CALCIUM 10 MG: 10 TABLET, FILM COATED ORAL at 16:27

## 2020-01-01 RX ADMIN — Medication 15 ML: at 08:17

## 2020-01-01 RX ADMIN — PREGABALIN 75 MG: 75 CAPSULE ORAL at 20:35

## 2020-01-01 RX ADMIN — PREGABALIN 75 MG: 75 CAPSULE ORAL at 08:48

## 2020-01-01 RX ADMIN — PIPERACILLIN AND TAZOBACTAM 3.38 G: 3; .375 INJECTION, POWDER, LYOPHILIZED, FOR SOLUTION INTRAVENOUS at 09:09

## 2020-01-01 RX ADMIN — VANCOMYCIN HYDROCHLORIDE 1500 MG: 1 INJECTION, POWDER, LYOPHILIZED, FOR SOLUTION INTRAVENOUS at 00:02

## 2020-01-01 RX ADMIN — PREGABALIN 75 MG: 75 CAPSULE ORAL at 20:13

## 2020-01-01 RX ADMIN — ATORVASTATIN CALCIUM 10 MG: 10 TABLET, FILM COATED ORAL at 22:04

## 2020-01-01 RX ADMIN — ENOXAPARIN SODIUM 40 MG: 40 INJECTION SUBCUTANEOUS at 11:34

## 2020-01-01 RX ADMIN — VANCOMYCIN HYDROCHLORIDE 1500 MG: 1 INJECTION, POWDER, LYOPHILIZED, FOR SOLUTION INTRAVENOUS at 00:21

## 2020-01-01 RX ADMIN — SACUBITRIL AND VALSARTAN 1 TABLET: 49; 51 TABLET, FILM COATED ORAL at 02:30

## 2020-01-01 RX ADMIN — FUROSEMIDE 60 MG: 10 INJECTION, SOLUTION INTRAMUSCULAR; INTRAVENOUS at 18:17

## 2020-01-01 RX ADMIN — SIMETHICONE 80 MG: 80 TABLET, CHEWABLE ORAL at 18:34

## 2020-01-01 RX ADMIN — FINASTERIDE 5 MG: 5 TABLET, FILM COATED ORAL at 08:51

## 2020-01-01 RX ADMIN — MODAFINIL 100 MG: 100 TABLET ORAL at 08:11

## 2020-01-01 RX ADMIN — SODIUM CHLORIDE, PRESERVATIVE FREE 10 ML: 5 INJECTION INTRAVENOUS at 08:25

## 2020-01-01 RX ADMIN — SACUBITRIL AND VALSARTAN 1 TABLET: 49; 51 TABLET, FILM COATED ORAL at 21:42

## 2020-01-01 RX ADMIN — SIMETHICONE 80 MG: 80 TABLET, CHEWABLE ORAL at 08:26

## 2020-01-01 RX ADMIN — METOPROLOL SUCCINATE 150 MG: 100 TABLET, FILM COATED, EXTENDED RELEASE ORAL at 20:48

## 2020-01-01 RX ADMIN — PROPOFOL 45 MCG/KG/MIN: 10 INJECTION, EMULSION INTRAVENOUS at 11:05

## 2020-01-01 RX ADMIN — FAMOTIDINE 20 MG: 10 INJECTION INTRAVENOUS at 23:27

## 2020-01-01 RX ADMIN — ISOSORBIDE MONONITRATE 30 MG: 30 TABLET ORAL at 18:34

## 2020-01-01 RX ADMIN — PREGABALIN 75 MG: 75 CAPSULE ORAL at 09:47

## 2020-01-01 RX ADMIN — BUDESONIDE AND FORMOTEROL FUMARATE DIHYDRATE 2 PUFF: 160; 4.5 AEROSOL RESPIRATORY (INHALATION) at 07:49

## 2020-01-01 RX ADMIN — ATORVASTATIN CALCIUM 10 MG: 10 TABLET, FILM COATED ORAL at 08:17

## 2020-01-01 RX ADMIN — PREGABALIN 75 MG: 75 CAPSULE ORAL at 08:40

## 2020-01-01 RX ADMIN — SODIUM CHLORIDE, PRESERVATIVE FREE 10 ML: 5 INJECTION INTRAVENOUS at 08:15

## 2020-01-01 RX ADMIN — Medication 1000 UNITS: at 08:35

## 2020-01-01 RX ADMIN — FINASTERIDE 5 MG: 5 TABLET, FILM COATED ORAL at 08:02

## 2020-01-01 RX ADMIN — ASPIRIN 81 MG: 81 TABLET ORAL at 09:25

## 2020-01-01 RX ADMIN — BACLOFEN 10 MG: 10 TABLET ORAL at 08:03

## 2020-01-01 RX ADMIN — SODIUM CHLORIDE, PRESERVATIVE FREE 10 ML: 5 INJECTION INTRAVENOUS at 20:35

## 2020-01-01 RX ADMIN — BUSPIRONE HYDROCHLORIDE 5 MG: 5 TABLET ORAL at 11:13

## 2020-01-01 RX ADMIN — METOPROLOL SUCCINATE 150 MG: 100 TABLET, FILM COATED, EXTENDED RELEASE ORAL at 09:14

## 2020-01-01 RX ADMIN — SIMETHICONE 80 MG: 80 TABLET, CHEWABLE ORAL at 14:02

## 2020-01-01 RX ADMIN — BUSPIRONE HYDROCHLORIDE 5 MG: 5 TABLET ORAL at 08:51

## 2020-01-01 RX ADMIN — PIPERACILLIN AND TAZOBACTAM 3.38 G: 3; .375 INJECTION, POWDER, LYOPHILIZED, FOR SOLUTION INTRAVENOUS at 09:55

## 2020-01-01 RX ADMIN — PIPERACILLIN AND TAZOBACTAM 3.38 G: 3; .375 INJECTION, POWDER, LYOPHILIZED, FOR SOLUTION INTRAVENOUS at 17:25

## 2020-01-01 RX ADMIN — SACUBITRIL AND VALSARTAN 1 TABLET: 49; 51 TABLET, FILM COATED ORAL at 08:36

## 2020-01-01 RX ADMIN — PREGABALIN 75 MG: 75 CAPSULE ORAL at 08:24

## 2020-01-01 RX ADMIN — PANTOPRAZOLE SODIUM 40 MG: 40 TABLET, DELAYED RELEASE ORAL at 05:25

## 2020-01-01 RX ADMIN — DEXAMETHASONE SODIUM PHOSPHATE 4 MG: 4 INJECTION, SOLUTION INTRAMUSCULAR; INTRAVENOUS at 08:35

## 2020-01-01 RX ADMIN — OXYBUTYNIN CHLORIDE 5 MG: 5 TABLET, EXTENDED RELEASE ORAL at 21:07

## 2020-01-01 RX ADMIN — TIOTROPIUM BROMIDE INHALATION SPRAY 2 PUFF: 3.12 SPRAY, METERED RESPIRATORY (INHALATION) at 07:55

## 2020-01-01 RX ADMIN — FUROSEMIDE 60 MG: 10 INJECTION, SOLUTION INTRAMUSCULAR; INTRAVENOUS at 13:16

## 2020-01-01 RX ADMIN — Medication 75 MCG/HR: at 22:29

## 2020-01-01 RX ADMIN — PROPOFOL 50 MCG/KG/MIN: 10 INJECTION, EMULSION INTRAVENOUS at 20:26

## 2020-01-01 RX ADMIN — FINASTERIDE 5 MG: 5 TABLET, FILM COATED ORAL at 08:26

## 2020-01-01 RX ADMIN — SODIUM CHLORIDE 20 ML: 9 INJECTION, SOLUTION INTRAVENOUS at 22:35

## 2020-01-01 RX ADMIN — BACLOFEN 10 MG: 10 TABLET ORAL at 11:13

## 2020-01-01 RX ADMIN — FAMOTIDINE 20 MG: 20 TABLET, FILM COATED ORAL at 08:14

## 2020-01-01 RX ADMIN — PSYLLIUM HUSK 1 PACKET: 3.4 GRANULE ORAL at 08:27

## 2020-01-01 RX ADMIN — ENOXAPARIN SODIUM 40 MG: 40 INJECTION SUBCUTANEOUS at 11:19

## 2020-01-01 RX ADMIN — DEXMEDETOMIDINE 1.2 MCG/KG/HR: 100 INJECTION, SOLUTION, CONCENTRATE INTRAVENOUS at 02:09

## 2020-01-01 RX ADMIN — BACLOFEN 10 MG: 10 TABLET ORAL at 09:59

## 2020-01-01 RX ADMIN — SODIUM CHLORIDE, PRESERVATIVE FREE 10 ML: 5 INJECTION INTRAVENOUS at 08:12

## 2020-01-01 RX ADMIN — PIPERACILLIN AND TAZOBACTAM 3.38 G: 3; .375 INJECTION, POWDER, LYOPHILIZED, FOR SOLUTION INTRAVENOUS at 10:24

## 2020-01-01 RX ADMIN — PIPERACILLIN AND TAZOBACTAM 3.38 G: 3; .375 INJECTION, POWDER, LYOPHILIZED, FOR SOLUTION INTRAVENOUS at 08:51

## 2020-01-01 RX ADMIN — FAMOTIDINE 20 MG: 20 TABLET, FILM COATED ORAL at 11:12

## 2020-01-01 RX ADMIN — METOPROLOL SUCCINATE 150 MG: 100 TABLET, FILM COATED, EXTENDED RELEASE ORAL at 08:24

## 2020-01-01 RX ADMIN — BUSPIRONE HYDROCHLORIDE 5 MG: 5 TABLET ORAL at 08:26

## 2020-01-01 RX ADMIN — ENOXAPARIN SODIUM 40 MG: 40 INJECTION SUBCUTANEOUS at 08:14

## 2020-01-01 RX ADMIN — FENTANYL CITRATE: 50 INJECTION, SOLUTION INTRAMUSCULAR; INTRAVENOUS at 21:35

## 2020-01-01 RX ADMIN — BUDESONIDE AND FORMOTEROL FUMARATE DIHYDRATE 2 PUFF: 80; 4.5 AEROSOL RESPIRATORY (INHALATION) at 20:24

## 2020-01-01 RX ADMIN — SODIUM CHLORIDE: 9 INJECTION, SOLUTION INTRAVENOUS at 16:00

## 2020-01-01 RX ADMIN — PREGABALIN 75 MG: 75 CAPSULE ORAL at 21:07

## 2020-01-01 RX ADMIN — HYDRALAZINE HYDROCHLORIDE 10 MG: 10 TABLET, FILM COATED ORAL at 08:24

## 2020-01-01 RX ADMIN — ACETAMINOPHEN 650 MG: 325 TABLET ORAL at 20:37

## 2020-01-01 RX ADMIN — BUSPIRONE HYDROCHLORIDE 5 MG: 5 TABLET ORAL at 15:19

## 2020-01-01 RX ADMIN — BUDESONIDE AND FORMOTEROL FUMARATE DIHYDRATE 2 PUFF: 80; 4.5 AEROSOL RESPIRATORY (INHALATION) at 08:40

## 2020-01-01 RX ADMIN — PANTOPRAZOLE SODIUM 40 MG: 40 TABLET, DELAYED RELEASE ORAL at 06:26

## 2020-01-01 RX ADMIN — ALBUTEROL SULFATE 2.5 MG: 2.5 SOLUTION RESPIRATORY (INHALATION) at 19:08

## 2020-01-01 RX ADMIN — FUROSEMIDE 20 MG: 20 TABLET ORAL at 08:12

## 2020-01-01 RX ADMIN — METOPROLOL SUCCINATE 50 MG: 50 TABLET, FILM COATED, EXTENDED RELEASE ORAL at 11:58

## 2020-01-01 RX ADMIN — ATORVASTATIN CALCIUM 10 MG: 10 TABLET, FILM COATED ORAL at 20:13

## 2020-01-01 RX ADMIN — REMDESIVIR 100 MG: 100 INJECTION, POWDER, LYOPHILIZED, FOR SOLUTION INTRAVENOUS at 23:41

## 2020-01-01 RX ADMIN — OXYCODONE HYDROCHLORIDE AND ACETAMINOPHEN 1000 MG: 500 TABLET ORAL at 09:15

## 2020-01-01 RX ADMIN — DEXMEDETOMIDINE 0.7 MCG/KG/HR: 100 INJECTION, SOLUTION, CONCENTRATE INTRAVENOUS at 03:18

## 2020-01-01 RX ADMIN — ATORVASTATIN CALCIUM 10 MG: 10 TABLET, FILM COATED ORAL at 14:01

## 2020-01-01 RX ADMIN — ATORVASTATIN CALCIUM 10 MG: 10 TABLET, FILM COATED ORAL at 08:28

## 2020-01-01 RX ADMIN — ALBUTEROL SULFATE 2.5 MG: 2.5 SOLUTION RESPIRATORY (INHALATION) at 12:45

## 2020-01-01 RX ADMIN — INSULIN LISPRO 1 UNITS: 100 INJECTION, SOLUTION INTRAVENOUS; SUBCUTANEOUS at 22:37

## 2020-01-01 RX ADMIN — BUDESONIDE AND FORMOTEROL FUMARATE DIHYDRATE 2 PUFF: 160; 4.5 AEROSOL RESPIRATORY (INHALATION) at 07:55

## 2020-01-01 RX ADMIN — DEXAMETHASONE SODIUM PHOSPHATE 6 MG: 4 INJECTION, SOLUTION INTRAMUSCULAR; INTRAVENOUS at 20:48

## 2020-01-01 RX ADMIN — OXYCODONE HYDROCHLORIDE AND ACETAMINOPHEN 1000 MG: 500 TABLET ORAL at 22:22

## 2020-01-01 RX ADMIN — ETOMIDATE 20 MG: 2 INJECTION INTRAVENOUS at 18:30

## 2020-01-01 RX ADMIN — ACETAMINOPHEN 650 MG: 325 TABLET ORAL at 20:39

## 2020-01-01 RX ADMIN — SODIUM CHLORIDE: 9 INJECTION, SOLUTION INTRAVENOUS at 11:57

## 2020-01-01 RX ADMIN — ENOXAPARIN SODIUM 40 MG: 40 INJECTION SUBCUTANEOUS at 10:00

## 2020-01-01 RX ADMIN — Medication 1 CAPSULE: at 08:35

## 2020-01-01 RX ADMIN — PREGABALIN 75 MG: 75 CAPSULE ORAL at 20:48

## 2020-01-01 RX ADMIN — MULTIPLE VITAMINS W/ MINERALS TAB 1 TABLET: TAB at 08:39

## 2020-01-01 RX ADMIN — SODIUM CHLORIDE, PRESERVATIVE FREE 10 ML: 5 INJECTION INTRAVENOUS at 08:17

## 2020-01-01 RX ADMIN — METOPROLOL TARTRATE 50 MG: 100 TABLET, FILM COATED ORAL at 08:14

## 2020-01-01 RX ADMIN — Medication 1 MG: at 03:38

## 2020-01-01 RX ADMIN — ENOXAPARIN SODIUM 40 MG: 40 INJECTION SUBCUTANEOUS at 08:48

## 2020-01-01 RX ADMIN — SACUBITRIL AND VALSARTAN 1 TABLET: 49; 51 TABLET, FILM COATED ORAL at 21:43

## 2020-01-01 RX ADMIN — FUROSEMIDE 60 MG: 10 INJECTION, SOLUTION INTRAMUSCULAR; INTRAVENOUS at 20:12

## 2020-01-01 RX ADMIN — METOPROLOL TARTRATE 5 MG: 1 INJECTION, SOLUTION INTRAVENOUS at 01:50

## 2020-01-01 RX ADMIN — HYDRALAZINE HYDROCHLORIDE 10 MG: 10 TABLET, FILM COATED ORAL at 18:34

## 2020-01-01 RX ADMIN — METHYLPREDNISOLONE SODIUM SUCCINATE 125 MG: 125 INJECTION, POWDER, LYOPHILIZED, FOR SOLUTION INTRAMUSCULAR; INTRAVENOUS at 18:14

## 2020-01-01 RX ADMIN — PIPERACILLIN AND TAZOBACTAM 3.38 G: 3; .375 INJECTION, POWDER, LYOPHILIZED, FOR SOLUTION INTRAVENOUS at 01:23

## 2020-01-01 RX ADMIN — SACUBITRIL AND VALSARTAN 1 TABLET: 49; 51 TABLET, FILM COATED ORAL at 08:25

## 2020-01-01 RX ADMIN — MULTIPLE VITAMINS W/ MINERALS TAB 1 TABLET: TAB at 08:27

## 2020-01-01 RX ADMIN — BACLOFEN 10 MG: 10 TABLET ORAL at 08:33

## 2020-01-01 RX ADMIN — TIOTROPIUM BROMIDE INHALATION SPRAY 2 PUFF: 3.12 SPRAY, METERED RESPIRATORY (INHALATION) at 10:28

## 2020-01-01 RX ADMIN — SODIUM CHLORIDE, PRESERVATIVE FREE 10 ML: 5 INJECTION INTRAVENOUS at 09:26

## 2020-01-01 RX ADMIN — SACUBITRIL AND VALSARTAN 1 TABLET: 49; 51 TABLET, FILM COATED ORAL at 08:08

## 2020-01-01 RX ADMIN — METHYLPREDNISOLONE SODIUM SUCCINATE 125 MG: 125 INJECTION, POWDER, FOR SOLUTION INTRAMUSCULAR; INTRAVENOUS at 18:14

## 2020-01-01 RX ADMIN — HYDRALAZINE HYDROCHLORIDE 10 MG: 10 TABLET, FILM COATED ORAL at 22:20

## 2020-01-01 RX ADMIN — DICYCLOMINE HYDROCHLORIDE 20 MG: 20 TABLET ORAL at 21:41

## 2020-01-01 RX ADMIN — PIPERACILLIN AND TAZOBACTAM 3.38 G: 3; .375 INJECTION, POWDER, LYOPHILIZED, FOR SOLUTION INTRAVENOUS at 09:43

## 2020-01-01 RX ADMIN — IPRATROPIUM BROMIDE 0.5 MG: 0.5 SOLUTION RESPIRATORY (INHALATION) at 02:28

## 2020-01-01 RX ADMIN — PREGABALIN 75 MG: 75 CAPSULE ORAL at 21:42

## 2020-01-01 RX ADMIN — Medication 1000 UNITS: at 22:22

## 2020-01-01 RX ADMIN — ENOXAPARIN SODIUM 40 MG: 40 INJECTION SUBCUTANEOUS at 22:36

## 2020-01-01 RX ADMIN — SIMETHICONE 80 MG: 80 TABLET, CHEWABLE ORAL at 17:21

## 2020-01-01 RX ADMIN — ASPIRIN 81 MG: 81 TABLET ORAL at 08:14

## 2020-01-01 RX ADMIN — ISOSORBIDE MONONITRATE 30 MG: 30 TABLET ORAL at 08:23

## 2020-01-01 RX ADMIN — PREGABALIN 75 MG: 75 CAPSULE ORAL at 20:27

## 2020-01-01 RX ADMIN — Medication 10 ML: at 14:40

## 2020-01-01 RX ADMIN — BUSPIRONE HYDROCHLORIDE 5 MG: 5 TABLET ORAL at 21:41

## 2020-01-01 RX ADMIN — FAMOTIDINE 20 MG: 20 TABLET, FILM COATED ORAL at 08:28

## 2020-01-01 RX ADMIN — FAMOTIDINE 20 MG: 10 INJECTION INTRAVENOUS at 08:10

## 2020-01-01 RX ADMIN — INSULIN LISPRO 1 UNITS: 100 INJECTION, SOLUTION INTRAVENOUS; SUBCUTANEOUS at 05:43

## 2020-01-01 RX ADMIN — IOPAMIDOL 80 ML: 755 INJECTION, SOLUTION INTRAVENOUS at 02:07

## 2020-01-01 RX ADMIN — HYDROCORTISONE: 25 CREAM TOPICAL at 08:39

## 2020-01-01 RX ADMIN — INSULIN LISPRO 1 UNITS: 100 INJECTION, SOLUTION INTRAVENOUS; SUBCUTANEOUS at 14:26

## 2020-01-01 RX ADMIN — ATORVASTATIN CALCIUM 10 MG: 10 TABLET, FILM COATED ORAL at 09:59

## 2020-01-01 RX ADMIN — PIPERACILLIN AND TAZOBACTAM 3.38 G: 3; .375 INJECTION, POWDER, LYOPHILIZED, FOR SOLUTION INTRAVENOUS at 18:10

## 2020-01-01 RX ADMIN — ATORVASTATIN CALCIUM 10 MG: 10 TABLET, FILM COATED ORAL at 14:26

## 2020-01-01 RX ADMIN — BACLOFEN 10 MG: 10 TABLET ORAL at 08:12

## 2020-01-01 RX ADMIN — BACLOFEN 10 MG: 10 TABLET ORAL at 08:53

## 2020-01-01 RX ADMIN — SIMETHICONE 80 MG: 80 TABLET, CHEWABLE ORAL at 08:39

## 2020-01-01 RX ADMIN — DEXMEDETOMIDINE 1.2 MCG/KG/HR: 100 INJECTION, SOLUTION, CONCENTRATE INTRAVENOUS at 23:30

## 2020-01-01 RX ADMIN — MODAFINIL 100 MG: 100 TABLET ORAL at 08:48

## 2020-01-01 RX ADMIN — ASPIRIN 81 MG: 81 TABLET ORAL at 11:07

## 2020-01-01 RX ADMIN — METOPROLOL TARTRATE 50 MG: 100 TABLET, FILM COATED ORAL at 20:36

## 2020-01-01 RX ADMIN — DEXMEDETOMIDINE 1.2 MCG/KG/HR: 100 INJECTION, SOLUTION, CONCENTRATE INTRAVENOUS at 22:24

## 2020-01-01 RX ADMIN — FUROSEMIDE 20 MG: 20 TABLET ORAL at 09:15

## 2020-01-01 RX ADMIN — SIMETHICONE 80 MG: 80 TABLET, CHEWABLE ORAL at 08:27

## 2020-01-01 RX ADMIN — Medication 50 MEQ: at 03:34

## 2020-01-01 RX ADMIN — REMDESIVIR 100 MG: 100 INJECTION, POWDER, LYOPHILIZED, FOR SOLUTION INTRAVENOUS at 22:38

## 2020-01-01 RX ADMIN — DEXAMETHASONE SODIUM PHOSPHATE 4 MG: 4 INJECTION, SOLUTION INTRAMUSCULAR; INTRAVENOUS at 08:52

## 2020-01-01 RX ADMIN — MULTIPLE VITAMINS W/ MINERALS TAB 1 TABLET: TAB at 08:24

## 2020-01-01 RX ADMIN — PROPOFOL 20 MCG/KG/MIN: 10 INJECTION, EMULSION INTRAVENOUS at 19:37

## 2020-01-01 RX ADMIN — CEFEPIME 2 G: 2 INJECTION, POWDER, FOR SOLUTION INTRAVENOUS at 23:35

## 2020-01-01 RX ADMIN — SACUBITRIL AND VALSARTAN 1 TABLET: 49; 51 TABLET, FILM COATED ORAL at 21:41

## 2020-01-01 RX ADMIN — OXYCODONE HYDROCHLORIDE AND ACETAMINOPHEN 1000 MG: 500 TABLET ORAL at 08:10

## 2020-01-01 RX ADMIN — Medication 5 ML: at 23:27

## 2020-01-01 RX ADMIN — Medication 1 CAPSULE: at 08:09

## 2020-01-01 RX ADMIN — DEXMEDETOMIDINE 1.2 MCG/KG/HR: 100 INJECTION, SOLUTION, CONCENTRATE INTRAVENOUS at 05:24

## 2020-01-01 RX ADMIN — PROPOFOL 50 MCG/KG/MIN: 10 INJECTION, EMULSION INTRAVENOUS at 12:22

## 2020-01-01 RX ADMIN — LOPERAMIDE HYDROCHLORIDE 2 MG: 2 CAPSULE ORAL at 12:36

## 2020-01-01 RX ADMIN — INSULIN GLARGINE 60 UNITS: 100 INJECTION, SOLUTION SUBCUTANEOUS at 20:52

## 2020-01-01 RX ADMIN — METOPROLOL SUCCINATE 150 MG: 100 TABLET, FILM COATED, EXTENDED RELEASE ORAL at 21:07

## 2020-01-01 RX ADMIN — ENOXAPARIN SODIUM 40 MG: 40 INJECTION SUBCUTANEOUS at 22:39

## 2020-01-01 RX ADMIN — Medication 10 ML: at 20:48

## 2020-01-01 RX ADMIN — INSULIN LISPRO 1 UNITS: 100 INJECTION, SOLUTION INTRAVENOUS; SUBCUTANEOUS at 10:30

## 2020-01-01 RX ADMIN — BACLOFEN 10 MG: 10 TABLET ORAL at 08:14

## 2020-01-01 RX ADMIN — BUDESONIDE AND FORMOTEROL FUMARATE DIHYDRATE 2 PUFF: 160; 4.5 AEROSOL RESPIRATORY (INHALATION) at 09:07

## 2020-01-01 RX ADMIN — PROPOFOL 30 MCG/KG/MIN: 10 INJECTION, EMULSION INTRAVENOUS at 13:24

## 2020-01-01 RX ADMIN — BUDESONIDE AND FORMOTEROL FUMARATE DIHYDRATE 2 PUFF: 160; 4.5 AEROSOL RESPIRATORY (INHALATION) at 18:41

## 2020-01-01 RX ADMIN — BUSPIRONE HYDROCHLORIDE 5 MG: 5 TABLET ORAL at 21:07

## 2020-01-01 RX ADMIN — METOPROLOL SUCCINATE 150 MG: 100 TABLET, FILM COATED, EXTENDED RELEASE ORAL at 21:40

## 2020-01-01 RX ADMIN — ENOXAPARIN SODIUM 40 MG: 40 INJECTION SUBCUTANEOUS at 21:41

## 2020-01-01 RX ADMIN — TIOTROPIUM BROMIDE INHALATION SPRAY 2 PUFF: 3.12 SPRAY, METERED RESPIRATORY (INHALATION) at 07:39

## 2020-01-01 RX ADMIN — Medication 15 ML: at 23:27

## 2020-01-01 RX ADMIN — INSULIN LISPRO 6 UNITS: 100 INJECTION, SOLUTION INTRAVENOUS; SUBCUTANEOUS at 04:25

## 2020-01-01 RX ADMIN — DEXAMETHASONE SODIUM PHOSPHATE 4 MG: 4 INJECTION, SOLUTION INTRAMUSCULAR; INTRAVENOUS at 08:11

## 2020-01-01 RX ADMIN — ISOSORBIDE MONONITRATE 30 MG: 30 TABLET ORAL at 08:12

## 2020-01-01 RX ADMIN — PREGABALIN 75 MG: 75 CAPSULE ORAL at 08:01

## 2020-01-01 RX ADMIN — ATORVASTATIN CALCIUM 10 MG: 10 TABLET, FILM COATED ORAL at 14:58

## 2020-01-01 RX ADMIN — DEXMEDETOMIDINE 1.1 MCG/KG/HR: 100 INJECTION, SOLUTION, CONCENTRATE INTRAVENOUS at 18:31

## 2020-01-01 RX ADMIN — PROPOFOL 45 MCG/KG/MIN: 10 INJECTION, EMULSION INTRAVENOUS at 13:54

## 2020-01-01 RX ADMIN — SODIUM CHLORIDE, PRESERVATIVE FREE 10 ML: 5 INJECTION INTRAVENOUS at 21:48

## 2020-01-01 RX ADMIN — INSULIN GLARGINE 20 UNITS: 100 INJECTION, SOLUTION SUBCUTANEOUS at 14:03

## 2020-01-01 RX ADMIN — INSULIN HUMAN 10 UNITS: 100 INJECTION, SOLUTION PARENTERAL at 03:55

## 2020-01-01 RX ADMIN — PIPERACILLIN AND TAZOBACTAM 3.38 G: 3; .375 INJECTION, POWDER, LYOPHILIZED, FOR SOLUTION INTRAVENOUS at 01:52

## 2020-01-01 RX ADMIN — PREGABALIN 75 MG: 75 CAPSULE ORAL at 21:35

## 2020-01-01 RX ADMIN — DEXAMETHASONE SODIUM PHOSPHATE 6 MG: 4 INJECTION, SOLUTION INTRAMUSCULAR; INTRAVENOUS at 22:03

## 2020-01-01 RX ADMIN — BUSPIRONE HYDROCHLORIDE 5 MG: 5 TABLET ORAL at 08:40

## 2020-01-01 RX ADMIN — INSULIN LISPRO 1 UNITS: 100 INJECTION, SOLUTION INTRAVENOUS; SUBCUTANEOUS at 01:30

## 2020-01-01 RX ADMIN — TIOTROPIUM BROMIDE INHALATION SPRAY 2 PUFF: 3.12 SPRAY, METERED RESPIRATORY (INHALATION) at 08:45

## 2020-01-01 RX ADMIN — Medication 30 ML: at 13:34

## 2020-01-01 RX ADMIN — INSULIN LISPRO 12 UNITS: 100 INJECTION, SOLUTION INTRAVENOUS; SUBCUTANEOUS at 23:36

## 2020-01-01 RX ADMIN — SODIUM CHLORIDE, PRESERVATIVE FREE 10 ML: 5 INJECTION INTRAVENOUS at 20:36

## 2020-01-01 RX ADMIN — PROPOFOL 45 MCG/KG/MIN: 10 INJECTION, EMULSION INTRAVENOUS at 17:50

## 2020-01-01 RX ADMIN — SODIUM CHLORIDE, PRESERVATIVE FREE 10 ML: 5 INJECTION INTRAVENOUS at 21:56

## 2020-01-01 RX ADMIN — OXYCODONE HYDROCHLORIDE AND ACETAMINOPHEN 1000 MG: 500 TABLET ORAL at 08:08

## 2020-01-01 RX ADMIN — CEFEPIME 2 G: 2 INJECTION, POWDER, FOR SOLUTION INTRAVENOUS at 14:39

## 2020-01-01 RX ADMIN — Medication 1 CAPSULE: at 08:12

## 2020-01-01 RX ADMIN — INSULIN GLARGINE 50 UNITS: 100 INJECTION, SOLUTION SUBCUTANEOUS at 20:37

## 2020-01-01 RX ADMIN — SPIRONOLACTONE 25 MG: 25 TABLET ORAL at 08:26

## 2020-01-01 RX ADMIN — METOPROLOL SUCCINATE 150 MG: 100 TABLET, FILM COATED, EXTENDED RELEASE ORAL at 08:08

## 2020-01-01 RX ADMIN — DEXMEDETOMIDINE 0.8 MCG/KG/HR: 100 INJECTION, SOLUTION, CONCENTRATE INTRAVENOUS at 10:22

## 2020-01-01 RX ADMIN — ZOLPIDEM TARTRATE 5 MG: 5 TABLET ORAL at 23:35

## 2020-01-01 RX ADMIN — SODIUM CHLORIDE: 4.5 INJECTION, SOLUTION INTRAVENOUS at 09:41

## 2020-01-01 RX ADMIN — BUSPIRONE HYDROCHLORIDE 5 MG: 5 TABLET ORAL at 10:00

## 2020-01-01 RX ADMIN — BACLOFEN 10 MG: 10 TABLET ORAL at 08:40

## 2020-01-01 RX ADMIN — INSULIN LISPRO 3 UNITS: 100 INJECTION, SOLUTION INTRAVENOUS; SUBCUTANEOUS at 08:13

## 2020-01-01 RX ADMIN — ALBUTEROL SULFATE 2 PUFF: 90 AEROSOL, METERED RESPIRATORY (INHALATION) at 07:47

## 2020-01-01 RX ADMIN — FUROSEMIDE 60 MG: 10 INJECTION, SOLUTION INTRAMUSCULAR; INTRAVENOUS at 08:13

## 2020-01-01 RX ADMIN — DEXMEDETOMIDINE 0.6 MCG/KG/HR: 100 INJECTION, SOLUTION, CONCENTRATE INTRAVENOUS at 10:40

## 2020-01-01 RX ADMIN — FINASTERIDE 5 MG: 5 TABLET, FILM COATED ORAL at 08:14

## 2020-01-01 RX ADMIN — PREGABALIN 75 MG: 75 CAPSULE ORAL at 19:53

## 2020-01-01 RX ADMIN — BUSPIRONE HYDROCHLORIDE 5 MG: 5 TABLET ORAL at 08:31

## 2020-01-01 RX ADMIN — POTASSIUM CHLORIDE 40 MEQ: 1500 TABLET, EXTENDED RELEASE ORAL at 20:35

## 2020-01-01 RX ADMIN — ATORVASTATIN CALCIUM 10 MG: 10 TABLET, FILM COATED ORAL at 08:26

## 2020-01-01 RX ADMIN — MODAFINIL 100 MG: 100 TABLET ORAL at 09:25

## 2020-01-01 RX ADMIN — DEXMEDETOMIDINE 0.5 MCG/KG/HR: 100 INJECTION, SOLUTION, CONCENTRATE INTRAVENOUS at 21:41

## 2020-01-01 RX ADMIN — SPIRONOLACTONE 12.5 MG: 25 TABLET ORAL at 08:51

## 2020-01-01 RX ADMIN — BUDESONIDE AND FORMOTEROL FUMARATE DIHYDRATE 2 PUFF: 80; 4.5 AEROSOL RESPIRATORY (INHALATION) at 10:28

## 2020-01-01 RX ADMIN — SACUBITRIL AND VALSARTAN 1 TABLET: 49; 51 TABLET, FILM COATED ORAL at 08:27

## 2020-01-01 RX ADMIN — ASPIRIN 81 MG: 81 TABLET ORAL at 08:09

## 2020-01-01 RX ADMIN — PANTOPRAZOLE SODIUM 40 MG: 40 TABLET, DELAYED RELEASE ORAL at 08:35

## 2020-01-01 RX ADMIN — PROPOFOL 40 MCG/KG/MIN: 10 INJECTION, EMULSION INTRAVENOUS at 23:35

## 2020-01-01 RX ADMIN — INSULIN GLARGINE 60 UNITS: 100 INJECTION, SOLUTION SUBCUTANEOUS at 09:35

## 2020-01-01 RX ADMIN — METOCLOPRAMIDE 10 MG: 5 INJECTION, SOLUTION INTRAMUSCULAR; INTRAVENOUS at 17:54

## 2020-01-01 RX ADMIN — IOPAMIDOL 65 ML: 755 INJECTION, SOLUTION INTRAVENOUS at 14:36

## 2020-01-01 RX ADMIN — ENOXAPARIN SODIUM 40 MG: 40 INJECTION SUBCUTANEOUS at 09:38

## 2020-01-01 RX ADMIN — BUSPIRONE HYDROCHLORIDE 5 MG: 5 TABLET ORAL at 13:20

## 2020-01-01 RX ADMIN — MULTIPLE VITAMINS W/ MINERALS TAB 1 TABLET: TAB at 08:33

## 2020-01-01 RX ADMIN — BUSPIRONE HYDROCHLORIDE 5 MG: 5 TABLET ORAL at 23:27

## 2020-01-01 RX ADMIN — SODIUM CHLORIDE, PRESERVATIVE FREE 10 ML: 5 INJECTION INTRAVENOUS at 22:09

## 2020-01-01 RX ADMIN — BUSPIRONE HYDROCHLORIDE 5 MG: 5 TABLET ORAL at 20:35

## 2020-01-01 RX ADMIN — PROPOFOL 40 MCG/KG/MIN: 10 INJECTION, EMULSION INTRAVENOUS at 17:11

## 2020-01-01 RX ADMIN — ISOSORBIDE MONONITRATE 30 MG: 30 TABLET ORAL at 08:58

## 2020-01-01 RX ADMIN — BUDESONIDE AND FORMOTEROL FUMARATE DIHYDRATE 2 PUFF: 160; 4.5 AEROSOL RESPIRATORY (INHALATION) at 20:44

## 2020-01-01 RX ADMIN — HYDROCORTISONE: 25 CREAM TOPICAL at 23:50

## 2020-01-01 RX ADMIN — BUSPIRONE HYDROCHLORIDE 5 MG: 5 TABLET ORAL at 13:51

## 2020-01-01 RX ADMIN — TAMSULOSIN HYDROCHLORIDE 0.4 MG: 0.4 CAPSULE ORAL at 08:33

## 2020-01-01 RX ADMIN — ATORVASTATIN CALCIUM 10 MG: 10 TABLET, FILM COATED ORAL at 08:51

## 2020-01-01 RX ADMIN — HYDRALAZINE HYDROCHLORIDE 10 MG: 10 TABLET, FILM COATED ORAL at 23:30

## 2020-01-01 RX ADMIN — METOPROLOL SUCCINATE 150 MG: 100 TABLET, FILM COATED, EXTENDED RELEASE ORAL at 20:35

## 2020-01-01 RX ADMIN — METOPROLOL TARTRATE 50 MG: 100 TABLET, FILM COATED ORAL at 19:53

## 2020-01-01 RX ADMIN — PREGABALIN 75 MG: 75 CAPSULE ORAL at 08:35

## 2020-01-01 RX ADMIN — PREGABALIN 75 MG: 75 CAPSULE ORAL at 08:08

## 2020-01-01 RX ADMIN — SIMETHICONE 80 MG: 80 TABLET, CHEWABLE ORAL at 21:42

## 2020-01-01 RX ADMIN — HYDRALAZINE HYDROCHLORIDE 10 MG: 10 TABLET, FILM COATED ORAL at 20:52

## 2020-01-01 RX ADMIN — PROPOFOL 20 MCG/KG/MIN: 10 INJECTION, EMULSION INTRAVENOUS at 18:49

## 2020-01-01 RX ADMIN — FINASTERIDE 5 MG: 5 TABLET, FILM COATED ORAL at 10:00

## 2020-01-01 RX ADMIN — FENTANYL CITRATE 50 MCG: 50 INJECTION, SOLUTION INTRAMUSCULAR; INTRAVENOUS at 04:43

## 2020-01-01 RX ADMIN — BUSPIRONE HYDROCHLORIDE 5 MG: 5 TABLET ORAL at 14:01

## 2020-01-01 RX ADMIN — FAMOTIDINE 20 MG: 20 TABLET, FILM COATED ORAL at 08:25

## 2020-01-01 RX ADMIN — SPIRONOLACTONE 12.5 MG: 25 TABLET ORAL at 08:11

## 2020-01-01 RX ADMIN — SODIUM CHLORIDE 1000 ML: 9 INJECTION, SOLUTION INTRAVENOUS at 18:00

## 2020-01-01 RX ADMIN — REMDESIVIR 100 MG: 100 INJECTION, POWDER, LYOPHILIZED, FOR SOLUTION INTRAVENOUS at 22:34

## 2020-01-01 RX ADMIN — CEFEPIME 2 G: 2 INJECTION, POWDER, FOR SOLUTION INTRAVENOUS at 11:34

## 2020-01-01 RX ADMIN — BUDESONIDE AND FORMOTEROL FUMARATE DIHYDRATE 2 PUFF: 80; 4.5 AEROSOL RESPIRATORY (INHALATION) at 19:30

## 2020-01-01 RX ADMIN — SODIUM CHLORIDE, PRESERVATIVE FREE 10 ML: 5 INJECTION INTRAVENOUS at 21:41

## 2020-01-01 RX ADMIN — CEFEPIME 2 G: 2 INJECTION, POWDER, FOR SOLUTION INTRAVENOUS at 21:58

## 2020-01-01 RX ADMIN — Medication 5 ML: at 20:52

## 2020-01-01 RX ADMIN — OXYBUTYNIN CHLORIDE 5 MG: 5 TABLET, EXTENDED RELEASE ORAL at 20:35

## 2020-01-01 RX ADMIN — MODAFINIL 100 MG: 100 TABLET ORAL at 08:08

## 2020-01-01 RX ADMIN — SODIUM CHLORIDE, PRESERVATIVE FREE 10 ML: 5 INJECTION INTRAVENOUS at 08:18

## 2020-01-01 RX ADMIN — Medication 30 ML: at 08:52

## 2020-01-01 RX ADMIN — SPIRONOLACTONE 25 MG: 25 TABLET ORAL at 08:24

## 2020-01-01 RX ADMIN — INSULIN GLARGINE 60 UNITS: 100 INJECTION, SOLUTION SUBCUTANEOUS at 21:26

## 2020-01-01 RX ADMIN — SODIUM CHLORIDE, PRESERVATIVE FREE 10 ML: 5 INJECTION INTRAVENOUS at 08:09

## 2020-01-01 RX ADMIN — ATORVASTATIN CALCIUM 10 MG: 10 TABLET, FILM COATED ORAL at 08:40

## 2020-01-01 RX ADMIN — LOPERAMIDE HYDROCHLORIDE 2 MG: 2 CAPSULE ORAL at 20:26

## 2020-01-01 RX ADMIN — INSULIN LISPRO 2 UNITS: 100 INJECTION, SOLUTION INTRAVENOUS; SUBCUTANEOUS at 20:53

## 2020-01-01 RX ADMIN — INSULIN GLARGINE 20 UNITS: 100 INJECTION, SOLUTION SUBCUTANEOUS at 21:43

## 2020-01-01 RX ADMIN — MULTIPLE VITAMINS W/ MINERALS TAB 1 TABLET: TAB at 08:08

## 2020-01-01 RX ADMIN — PIPERACILLIN AND TAZOBACTAM 3.38 G: 3; .375 INJECTION, POWDER, LYOPHILIZED, FOR SOLUTION INTRAVENOUS at 09:35

## 2020-01-01 RX ADMIN — FINASTERIDE 5 MG: 5 TABLET, FILM COATED ORAL at 08:28

## 2020-01-01 RX ADMIN — FINASTERIDE 5 MG: 5 TABLET, FILM COATED ORAL at 09:14

## 2020-01-01 RX ADMIN — ATORVASTATIN CALCIUM 10 MG: 10 TABLET, FILM COATED ORAL at 08:14

## 2020-01-01 RX ADMIN — PREGABALIN 75 MG: 75 CAPSULE ORAL at 22:09

## 2020-01-01 RX ADMIN — INSULIN GLARGINE 20 UNITS: 100 INJECTION, SOLUTION SUBCUTANEOUS at 00:06

## 2020-01-01 RX ADMIN — BUDESONIDE AND FORMOTEROL FUMARATE DIHYDRATE 2 PUFF: 160; 4.5 AEROSOL RESPIRATORY (INHALATION) at 19:46

## 2020-01-01 RX ADMIN — BUDESONIDE AND FORMOTEROL FUMARATE DIHYDRATE 2 PUFF: 160; 4.5 AEROSOL RESPIRATORY (INHALATION) at 07:57

## 2020-01-01 RX ADMIN — MORPHINE SULFATE 2 MG: 2 INJECTION, SOLUTION INTRAMUSCULAR; INTRAVENOUS at 12:40

## 2020-01-01 RX ADMIN — IOPAMIDOL 80 ML: 755 INJECTION, SOLUTION INTRAVENOUS at 19:50

## 2020-01-01 RX ADMIN — ENOXAPARIN SODIUM 40 MG: 40 INJECTION SUBCUTANEOUS at 08:12

## 2020-01-01 RX ADMIN — ACETAMINOPHEN 650 MG: 325 TABLET ORAL at 00:20

## 2020-01-01 RX ADMIN — ENOXAPARIN SODIUM 40 MG: 40 INJECTION SUBCUTANEOUS at 20:51

## 2020-01-01 RX ADMIN — DEXMEDETOMIDINE 1.2 MCG/KG/HR: 100 INJECTION, SOLUTION, CONCENTRATE INTRAVENOUS at 16:36

## 2020-01-01 RX ADMIN — BUSPIRONE HYDROCHLORIDE 5 MG: 5 TABLET ORAL at 19:53

## 2020-01-01 RX ADMIN — INSULIN GLARGINE 50 UNITS: 100 INJECTION, SOLUTION SUBCUTANEOUS at 08:46

## 2020-01-01 RX ADMIN — BUSPIRONE HYDROCHLORIDE 5 MG: 5 TABLET ORAL at 08:01

## 2020-01-01 RX ADMIN — PROPOFOL 40 MCG/KG/MIN: 10 INJECTION, EMULSION INTRAVENOUS at 16:09

## 2020-01-01 RX ADMIN — ENOXAPARIN SODIUM 40 MG: 40 INJECTION SUBCUTANEOUS at 08:26

## 2020-01-01 RX ADMIN — Medication 1 MG: at 03:41

## 2020-01-01 RX ADMIN — PSYLLIUM HUSK 1 PACKET: 3.4 GRANULE ORAL at 14:51

## 2020-01-01 RX ADMIN — SACUBITRIL AND VALSARTAN 1 TABLET: 49; 51 TABLET, FILM COATED ORAL at 20:27

## 2020-01-01 RX ADMIN — DEXMEDETOMIDINE 0.8 MCG/KG/HR: 100 INJECTION, SOLUTION, CONCENTRATE INTRAVENOUS at 16:06

## 2020-01-01 RX ADMIN — INSULIN GLARGINE 45 UNITS: 100 INJECTION, SOLUTION SUBCUTANEOUS at 20:37

## 2020-01-01 RX ADMIN — TIOTROPIUM BROMIDE INHALATION SPRAY 2 PUFF: 3.12 SPRAY, METERED RESPIRATORY (INHALATION) at 10:04

## 2020-01-01 RX ADMIN — BUDESONIDE AND FORMOTEROL FUMARATE DIHYDRATE 2 PUFF: 80; 4.5 AEROSOL RESPIRATORY (INHALATION) at 08:42

## 2020-01-01 RX ADMIN — ATORVASTATIN CALCIUM 10 MG: 10 TABLET, FILM COATED ORAL at 20:36

## 2020-01-01 RX ADMIN — Medication 50 MG: at 22:21

## 2020-01-01 RX ADMIN — DEXMEDETOMIDINE 0.8 MCG/KG/HR: 100 INJECTION, SOLUTION, CONCENTRATE INTRAVENOUS at 05:24

## 2020-01-01 RX ADMIN — SODIUM CHLORIDE, PRESERVATIVE FREE 10 ML: 5 INJECTION INTRAVENOUS at 09:17

## 2020-01-01 RX ADMIN — FUROSEMIDE 60 MG: 10 INJECTION INTRAMUSCULAR; INTRAVENOUS at 13:16

## 2020-01-01 RX ADMIN — DICYCLOMINE HYDROCHLORIDE 20 MG: 20 TABLET ORAL at 08:45

## 2020-01-01 RX ADMIN — FUROSEMIDE 60 MG: 10 INJECTION, SOLUTION INTRAMUSCULAR; INTRAVENOUS at 17:25

## 2020-01-01 RX ADMIN — DEXAMETHASONE SODIUM PHOSPHATE 6 MG: 4 INJECTION, SOLUTION INTRAMUSCULAR; INTRAVENOUS at 22:21

## 2020-01-01 RX ADMIN — TIOTROPIUM BROMIDE INHALATION SPRAY 2 PUFF: 3.12 SPRAY, METERED RESPIRATORY (INHALATION) at 07:45

## 2020-01-01 RX ADMIN — METOPROLOL SUCCINATE 150 MG: 100 TABLET, FILM COATED, EXTENDED RELEASE ORAL at 10:38

## 2020-01-01 RX ADMIN — INSULIN GLARGINE 60 UNITS: 100 INJECTION, SOLUTION SUBCUTANEOUS at 09:36

## 2020-01-01 RX ADMIN — Medication 1 MG: at 03:56

## 2020-01-01 RX ADMIN — PROPOFOL 45 MCG/KG/MIN: 10 INJECTION, EMULSION INTRAVENOUS at 21:42

## 2020-01-01 RX ADMIN — HYDRALAZINE HYDROCHLORIDE 10 MG: 10 TABLET, FILM COATED ORAL at 09:13

## 2020-01-01 RX ADMIN — SACUBITRIL AND VALSARTAN 1 TABLET: 24; 26 TABLET, FILM COATED ORAL at 13:51

## 2020-01-01 RX ADMIN — ISOSORBIDE MONONITRATE 30 MG: 30 TABLET ORAL at 09:14

## 2020-01-01 RX ADMIN — Medication 1 MG: at 03:32

## 2020-01-01 RX ADMIN — Medication 50 MEQ: at 03:28

## 2020-01-01 RX ADMIN — CEFEPIME 2 G: 2 INJECTION, POWDER, FOR SOLUTION INTRAVENOUS at 20:52

## 2020-01-01 RX ADMIN — DEXMEDETOMIDINE 0.2 MCG/KG/HR: 100 INJECTION, SOLUTION, CONCENTRATE INTRAVENOUS at 09:05

## 2020-01-01 RX ADMIN — ALBUTEROL SULFATE 2.5 MG: 2.5 SOLUTION RESPIRATORY (INHALATION) at 18:55

## 2020-01-01 RX ADMIN — SACUBITRIL AND VALSARTAN 1 TABLET: 49; 51 TABLET, FILM COATED ORAL at 08:12

## 2020-01-01 RX ADMIN — HYDROCORTISONE: 25 CREAM TOPICAL at 10:38

## 2020-01-01 RX ADMIN — FINASTERIDE 5 MG: 5 TABLET, FILM COATED ORAL at 11:13

## 2020-01-01 RX ADMIN — BUSPIRONE HYDROCHLORIDE 5 MG: 5 TABLET ORAL at 14:58

## 2020-01-01 RX ADMIN — METOPROLOL SUCCINATE 150 MG: 100 TABLET, FILM COATED, EXTENDED RELEASE ORAL at 22:22

## 2020-01-01 RX ADMIN — SACUBITRIL AND VALSARTAN 1 TABLET: 49; 51 TABLET, FILM COATED ORAL at 20:48

## 2020-01-01 RX ADMIN — MORPHINE SULFATE 4 MG: 2 INJECTION, SOLUTION INTRAMUSCULAR; INTRAVENOUS at 13:15

## 2020-01-01 RX ADMIN — FAMOTIDINE 20 MG: 20 TABLET, FILM COATED ORAL at 20:37

## 2020-01-01 RX ADMIN — Medication 1 MG: at 03:26

## 2020-01-01 RX ADMIN — SPIRONOLACTONE 12.5 MG: 25 TABLET ORAL at 09:17

## 2020-01-01 RX ADMIN — Medication 1000 UNITS: at 08:07

## 2020-01-01 RX ADMIN — PREGABALIN 75 MG: 75 CAPSULE ORAL at 21:41

## 2020-01-01 RX ADMIN — SODIUM CHLORIDE: 9 INJECTION, SOLUTION INTRAVENOUS at 08:09

## 2020-01-01 RX ADMIN — BUSPIRONE HYDROCHLORIDE 5 MG: 5 TABLET ORAL at 21:42

## 2020-01-01 RX ADMIN — BUSPIRONE HYDROCHLORIDE 5 MG: 5 TABLET ORAL at 13:35

## 2020-01-01 RX ADMIN — LOPERAMIDE HYDROCHLORIDE 2 MG: 2 CAPSULE ORAL at 12:48

## 2020-01-01 RX ADMIN — BUDESONIDE AND FORMOTEROL FUMARATE DIHYDRATE 2 PUFF: 80; 4.5 AEROSOL RESPIRATORY (INHALATION) at 17:41

## 2020-01-01 RX ADMIN — BUDESONIDE AND FORMOTEROL FUMARATE DIHYDRATE 2 PUFF: 160; 4.5 AEROSOL RESPIRATORY (INHALATION) at 17:47

## 2020-01-01 RX ADMIN — ASPIRIN 81 MG: 81 TABLET ORAL at 08:25

## 2020-01-01 RX ADMIN — SODIUM CHLORIDE 1000 ML: 9 INJECTION, SOLUTION INTRAVENOUS at 09:03

## 2020-01-01 RX ADMIN — POTASSIUM CHLORIDE 40 MEQ: 1500 TABLET, EXTENDED RELEASE ORAL at 08:28

## 2020-01-01 RX ADMIN — ATORVASTATIN CALCIUM 10 MG: 10 TABLET, FILM COATED ORAL at 13:20

## 2020-01-01 RX ADMIN — ATORVASTATIN CALCIUM 10 MG: 10 TABLET, FILM COATED ORAL at 13:50

## 2020-01-01 RX ADMIN — ATORVASTATIN CALCIUM 10 MG: 10 TABLET, FILM COATED ORAL at 14:36

## 2020-01-01 RX ADMIN — CEFEPIME 2 G: 2 INJECTION, POWDER, FOR SOLUTION INTRAVENOUS at 10:34

## 2020-01-01 RX ADMIN — BUDESONIDE AND FORMOTEROL FUMARATE DIHYDRATE 2 PUFF: 160; 4.5 AEROSOL RESPIRATORY (INHALATION) at 20:48

## 2020-01-01 RX ADMIN — TAMSULOSIN HYDROCHLORIDE 0.4 MG: 0.4 CAPSULE ORAL at 08:10

## 2020-01-01 RX ADMIN — PROPOFOL 50 MCG/KG/MIN: 10 INJECTION, EMULSION INTRAVENOUS at 03:31

## 2020-01-01 RX ADMIN — PIPERACILLIN AND TAZOBACTAM 3.38 G: 3; .375 INJECTION, POWDER, LYOPHILIZED, FOR SOLUTION INTRAVENOUS at 22:19

## 2020-01-01 RX ADMIN — DICYCLOMINE HYDROCHLORIDE 20 MG: 20 TABLET ORAL at 22:22

## 2020-01-01 RX ADMIN — INSULIN LISPRO 15 UNITS: 100 INJECTION, SOLUTION INTRAVENOUS; SUBCUTANEOUS at 20:38

## 2020-01-01 RX ADMIN — BUSPIRONE HYDROCHLORIDE 5 MG: 5 TABLET ORAL at 20:12

## 2020-01-01 RX ADMIN — BUSPIRONE HYDROCHLORIDE 5 MG: 5 TABLET ORAL at 16:29

## 2020-01-01 RX ADMIN — BACLOFEN 10 MG: 10 TABLET ORAL at 08:51

## 2020-01-01 RX ADMIN — HYDRALAZINE HYDROCHLORIDE 10 MG: 10 TABLET, FILM COATED ORAL at 08:08

## 2020-01-01 RX ADMIN — INSULIN GLARGINE 20 UNITS: 100 INJECTION, SOLUTION SUBCUTANEOUS at 20:53

## 2020-01-01 RX ADMIN — OXYCODONE HYDROCHLORIDE AND ACETAMINOPHEN 1000 MG: 500 TABLET ORAL at 08:34

## 2020-01-01 RX ADMIN — Medication 100 MG: at 13:56

## 2020-01-01 RX ADMIN — ATORVASTATIN CALCIUM 10 MG: 10 TABLET, FILM COATED ORAL at 08:01

## 2020-01-01 RX ADMIN — BUDESONIDE AND FORMOTEROL FUMARATE DIHYDRATE 2 PUFF: 80; 4.5 AEROSOL RESPIRATORY (INHALATION) at 08:45

## 2020-01-01 RX ADMIN — MULTIPLE VITAMINS W/ MINERALS TAB 1 TABLET: TAB at 08:11

## 2020-01-01 RX ADMIN — FAMOTIDINE 20 MG: 10 INJECTION INTRAVENOUS at 20:36

## 2020-01-01 RX ADMIN — PROPOFOL 20 MCG/KG/MIN: 10 INJECTION, EMULSION INTRAVENOUS at 07:38

## 2020-01-01 RX ADMIN — DEXMEDETOMIDINE 1.2 MCG/KG/HR: 100 INJECTION, SOLUTION, CONCENTRATE INTRAVENOUS at 09:20

## 2020-01-01 RX ADMIN — SODIUM CHLORIDE: 4.5 INJECTION, SOLUTION INTRAVENOUS at 18:16

## 2020-01-01 RX ADMIN — ENOXAPARIN SODIUM 40 MG: 40 INJECTION SUBCUTANEOUS at 09:26

## 2020-01-01 RX ADMIN — Medication 1 MG: at 03:24

## 2020-01-01 RX ADMIN — SPIRONOLACTONE 25 MG: 25 TABLET ORAL at 14:02

## 2020-01-01 RX ADMIN — Medication 50 MG: at 09:15

## 2020-01-01 RX ADMIN — METOPROLOL TARTRATE 50 MG: 100 TABLET, FILM COATED ORAL at 16:28

## 2020-01-01 RX ADMIN — DIPHENHYDRAMINE HYDROCHLORIDE 25 MG: 50 INJECTION INTRAMUSCULAR; INTRAVENOUS at 08:13

## 2020-01-01 RX ADMIN — FUROSEMIDE 60 MG: 10 INJECTION, SOLUTION INTRAMUSCULAR; INTRAVENOUS at 09:26

## 2020-01-01 RX ADMIN — FAMOTIDINE 20 MG: 20 TABLET, FILM COATED ORAL at 08:40

## 2020-01-01 RX ADMIN — MODAFINIL 100 MG: 100 TABLET ORAL at 08:24

## 2020-01-01 RX ADMIN — METOPROLOL TARTRATE 50 MG: 100 TABLET, FILM COATED ORAL at 08:01

## 2020-01-01 RX ADMIN — DEXAMETHASONE SODIUM PHOSPHATE 4 MG: 4 INJECTION, SOLUTION INTRAMUSCULAR; INTRAVENOUS at 10:00

## 2020-01-01 RX ADMIN — SODIUM CHLORIDE, PRESERVATIVE FREE 10 ML: 5 INJECTION INTRAVENOUS at 09:55

## 2020-01-01 RX ADMIN — ATORVASTATIN CALCIUM 10 MG: 10 TABLET, FILM COATED ORAL at 19:53

## 2020-01-01 RX ADMIN — INSULIN LISPRO 3 UNITS: 100 INJECTION, SOLUTION INTRAVENOUS; SUBCUTANEOUS at 01:38

## 2020-01-01 RX ADMIN — BUDESONIDE AND FORMOTEROL FUMARATE DIHYDRATE 2 PUFF: 80; 4.5 AEROSOL RESPIRATORY (INHALATION) at 07:39

## 2020-01-01 RX ADMIN — PREGABALIN 75 MG: 75 CAPSULE ORAL at 08:26

## 2020-01-01 RX ADMIN — METOPROLOL SUCCINATE 150 MG: 100 TABLET, FILM COATED, EXTENDED RELEASE ORAL at 08:39

## 2020-01-01 RX ADMIN — METOPROLOL TARTRATE 75 MG: 25 TABLET ORAL at 18:06

## 2020-01-01 RX ADMIN — REMDESIVIR 100 MG: 100 INJECTION, POWDER, LYOPHILIZED, FOR SOLUTION INTRAVENOUS at 23:18

## 2020-01-01 RX ADMIN — DICYCLOMINE HYDROCHLORIDE 20 MG: 20 TABLET ORAL at 02:39

## 2020-01-01 RX ADMIN — BACLOFEN 10 MG: 10 TABLET ORAL at 09:18

## 2020-01-01 RX ADMIN — ATORVASTATIN CALCIUM 10 MG: 10 TABLET, FILM COATED ORAL at 15:19

## 2020-01-01 RX ADMIN — PROPOFOL 35 MCG/KG/MIN: 10 INJECTION, EMULSION INTRAVENOUS at 21:43

## 2020-01-01 RX ADMIN — METOPROLOL TARTRATE 5 MG: 5 INJECTION, SOLUTION INTRAVENOUS at 18:22

## 2020-01-01 RX ADMIN — BUSPIRONE HYDROCHLORIDE 5 MG: 5 TABLET ORAL at 20:37

## 2020-01-01 RX ADMIN — FAMOTIDINE 20 MG: 20 TABLET, FILM COATED ORAL at 21:42

## 2020-01-01 RX ADMIN — MULTIPLE VITAMINS W/ MINERALS TAB 1 TABLET: TAB at 08:36

## 2020-01-01 RX ADMIN — FAMOTIDINE 20 MG: 10 INJECTION INTRAVENOUS at 20:12

## 2020-01-01 RX ADMIN — BUSPIRONE HYDROCHLORIDE 5 MG: 5 TABLET ORAL at 08:14

## 2020-01-01 RX ADMIN — PROPOFOL 45 MCG/KG/MIN: 10 INJECTION, EMULSION INTRAVENOUS at 06:18

## 2020-01-01 RX ADMIN — BUSPIRONE HYDROCHLORIDE 5 MG: 5 TABLET ORAL at 20:36

## 2020-01-01 RX ADMIN — ASPIRIN 81 MG: 81 TABLET ORAL at 09:06

## 2020-01-01 RX ADMIN — BUDESONIDE AND FORMOTEROL FUMARATE DIHYDRATE 2 PUFF: 160; 4.5 AEROSOL RESPIRATORY (INHALATION) at 07:47

## 2020-01-01 RX ADMIN — ENOXAPARIN SODIUM 40 MG: 40 INJECTION SUBCUTANEOUS at 08:37

## 2020-01-01 RX ADMIN — PIPERACILLIN AND TAZOBACTAM 3.38 G: 3; .375 INJECTION, POWDER, LYOPHILIZED, FOR SOLUTION INTRAVENOUS at 18:03

## 2020-01-01 RX ADMIN — PROPOFOL 50 MCG/KG/MIN: 10 INJECTION, EMULSION INTRAVENOUS at 19:30

## 2020-01-01 RX ADMIN — METOPROLOL TARTRATE 50 MG: 100 TABLET, FILM COATED ORAL at 20:12

## 2020-01-01 RX ADMIN — SPIRONOLACTONE 12.5 MG: 25 TABLET ORAL at 08:33

## 2020-01-01 RX ADMIN — ATORVASTATIN CALCIUM 10 MG: 10 TABLET, FILM COATED ORAL at 13:35

## 2020-01-01 RX ADMIN — FUROSEMIDE 60 MG: 10 INJECTION, SOLUTION INTRAMUSCULAR; INTRAVENOUS at 17:57

## 2020-01-01 RX ADMIN — FINASTERIDE 5 MG: 5 TABLET, FILM COATED ORAL at 08:24

## 2020-01-01 RX ADMIN — METOPROLOL SUCCINATE 50 MG: 50 TABLET, FILM COATED, EXTENDED RELEASE ORAL at 21:42

## 2020-01-01 RX ADMIN — SODIUM CHLORIDE, PRESERVATIVE FREE 10 ML: 5 INJECTION INTRAVENOUS at 23:28

## 2020-01-01 RX ADMIN — FAMOTIDINE 20 MG: 10 INJECTION INTRAVENOUS at 08:48

## 2020-01-01 RX ADMIN — DEXMEDETOMIDINE 1 MCG/KG/HR: 100 INJECTION, SOLUTION, CONCENTRATE INTRAVENOUS at 14:51

## 2020-01-01 RX ADMIN — MODAFINIL 100 MG: 100 TABLET ORAL at 08:01

## 2020-01-01 RX ADMIN — POTASSIUM CHLORIDE 40 MEQ: 1500 TABLET, EXTENDED RELEASE ORAL at 12:43

## 2020-01-01 RX ADMIN — ATORVASTATIN CALCIUM 10 MG: 10 TABLET, FILM COATED ORAL at 16:29

## 2020-01-01 RX ADMIN — METOPROLOL SUCCINATE 150 MG: 100 TABLET, FILM COATED, EXTENDED RELEASE ORAL at 20:26

## 2020-01-01 RX ADMIN — METOPROLOL TARTRATE 50 MG: 100 TABLET, FILM COATED ORAL at 08:51

## 2020-01-01 RX ADMIN — Medication 50 MEQ: at 03:51

## 2020-01-01 RX ADMIN — ASPIRIN 81 MG: 81 TABLET ORAL at 08:24

## 2020-01-01 RX ADMIN — PREGABALIN 75 MG: 75 CAPSULE ORAL at 08:28

## 2020-01-01 RX ADMIN — FENTANYL CITRATE 25 MCG: 50 INJECTION, SOLUTION INTRAMUSCULAR; INTRAVENOUS at 21:38

## 2020-01-01 RX ADMIN — FENTANYL CITRATE 25 MCG: 50 INJECTION, SOLUTION INTRAMUSCULAR; INTRAVENOUS at 08:34

## 2020-01-01 RX ADMIN — BUDESONIDE AND FORMOTEROL FUMARATE DIHYDRATE 2 PUFF: 160; 4.5 AEROSOL RESPIRATORY (INHALATION) at 07:44

## 2020-01-01 RX ADMIN — DEXMEDETOMIDINE 0.7 MCG/KG/HR: 100 INJECTION, SOLUTION, CONCENTRATE INTRAVENOUS at 22:28

## 2020-01-01 RX ADMIN — ASPIRIN 81 MG: 81 TABLET ORAL at 08:12

## 2020-01-01 RX ADMIN — Medication 1 MG: at 03:47

## 2020-01-01 RX ADMIN — METOPROLOL SUCCINATE 150 MG: 100 TABLET, FILM COATED, EXTENDED RELEASE ORAL at 08:33

## 2020-01-01 RX ADMIN — SODIUM CHLORIDE, PRESERVATIVE FREE 10 ML: 5 INJECTION INTRAVENOUS at 08:59

## 2020-01-01 RX ADMIN — DICYCLOMINE HYDROCHLORIDE 20 MG: 20 TABLET ORAL at 09:16

## 2020-01-01 RX ADMIN — VANCOMYCIN HYDROCHLORIDE 1500 MG: 1 INJECTION, POWDER, LYOPHILIZED, FOR SOLUTION INTRAVENOUS at 20:45

## 2020-01-01 RX ADMIN — INSULIN LISPRO 1 UNITS: 100 INJECTION, SOLUTION INTRAVENOUS; SUBCUTANEOUS at 05:51

## 2020-01-01 RX ADMIN — LOPERAMIDE HYDROCHLORIDE 2 MG: 2 CAPSULE ORAL at 12:44

## 2020-01-01 RX ADMIN — BUDESONIDE AND FORMOTEROL FUMARATE DIHYDRATE 2 PUFF: 160; 4.5 AEROSOL RESPIRATORY (INHALATION) at 17:51

## 2020-01-01 RX ADMIN — PROPOFOL 40 MCG/KG/MIN: 10 INJECTION, EMULSION INTRAVENOUS at 13:57

## 2020-01-01 RX ADMIN — SACUBITRIL AND VALSARTAN 1 TABLET: 49; 51 TABLET, FILM COATED ORAL at 20:35

## 2020-01-01 RX ADMIN — ENOXAPARIN SODIUM 40 MG: 40 INJECTION SUBCUTANEOUS at 09:03

## 2020-01-01 RX ADMIN — SIMETHICONE 80 MG: 80 TABLET, CHEWABLE ORAL at 12:25

## 2020-01-01 RX ADMIN — ACETAMINOPHEN 650 MG: 325 TABLET ORAL at 02:40

## 2020-01-01 RX ADMIN — BUDESONIDE AND FORMOTEROL FUMARATE DIHYDRATE 2 PUFF: 80; 4.5 AEROSOL RESPIRATORY (INHALATION) at 22:20

## 2020-01-01 RX ADMIN — FAMOTIDINE 20 MG: 20 TABLET, FILM COATED ORAL at 20:35

## 2020-01-01 RX ADMIN — SACUBITRIL AND VALSARTAN 1 TABLET: 49; 51 TABLET, FILM COATED ORAL at 21:35

## 2020-01-01 RX ADMIN — MODAFINIL 100 MG: 100 TABLET ORAL at 08:35

## 2020-01-01 RX ADMIN — BUSPIRONE HYDROCHLORIDE 5 MG: 5 TABLET ORAL at 08:03

## 2020-01-01 RX ADMIN — SODIUM CHLORIDE: 9 INJECTION, SOLUTION INTRAVENOUS at 15:46

## 2020-01-01 RX ADMIN — ENOXAPARIN SODIUM 40 MG: 40 INJECTION SUBCUTANEOUS at 09:25

## 2020-01-01 RX ADMIN — TIOTROPIUM BROMIDE INHALATION SPRAY 2 PUFF: 3.12 SPRAY, METERED RESPIRATORY (INHALATION) at 07:40

## 2020-01-01 RX ADMIN — BUSPIRONE HYDROCHLORIDE 5 MG: 5 TABLET ORAL at 16:27

## 2020-01-01 RX ADMIN — INSULIN GLARGINE 23 UNITS: 100 INJECTION, SOLUTION SUBCUTANEOUS at 01:17

## 2020-01-01 RX ADMIN — SODIUM CHLORIDE, PRESERVATIVE FREE 10 ML: 5 INJECTION INTRAVENOUS at 20:12

## 2020-01-01 RX ADMIN — MODAFINIL 100 MG: 100 TABLET ORAL at 12:50

## 2020-01-01 RX ADMIN — SODIUM CHLORIDE, PRESERVATIVE FREE 10 ML: 5 INJECTION INTRAVENOUS at 09:30

## 2020-01-01 RX ADMIN — SIMETHICONE 80 MG: 80 TABLET, CHEWABLE ORAL at 12:44

## 2020-01-01 RX ADMIN — FAMOTIDINE 20 MG: 20 TABLET, FILM COATED ORAL at 09:26

## 2020-01-01 RX ADMIN — SACUBITRIL AND VALSARTAN 1 TABLET: 49; 51 TABLET, FILM COATED ORAL at 09:15

## 2020-01-01 RX ADMIN — FUROSEMIDE 40 MG: 10 INJECTION, SOLUTION INTRAMUSCULAR; INTRAVENOUS at 11:37

## 2020-01-01 RX ADMIN — ASPIRIN 81 MG: 81 TABLET ORAL at 08:01

## 2020-01-01 RX ADMIN — INSULIN GLARGINE 60 UNITS: 100 INJECTION, SOLUTION SUBCUTANEOUS at 09:26

## 2020-01-01 RX ADMIN — SODIUM CHLORIDE, PRESERVATIVE FREE 10 ML: 5 INJECTION INTRAVENOUS at 08:21

## 2020-01-01 RX ADMIN — TAMSULOSIN HYDROCHLORIDE 0.4 MG: 0.4 CAPSULE ORAL at 09:15

## 2020-01-01 RX ADMIN — PIPERACILLIN AND TAZOBACTAM 3.38 G: 3; .375 INJECTION, POWDER, LYOPHILIZED, FOR SOLUTION INTRAVENOUS at 02:44

## 2020-01-01 RX ADMIN — BACLOFEN 10 MG: 10 TABLET ORAL at 08:09

## 2020-01-01 RX ADMIN — FAMOTIDINE 20 MG: 10 INJECTION INTRAVENOUS at 08:17

## 2020-01-01 RX ADMIN — PIPERACILLIN AND TAZOBACTAM 3.38 G: 3; .375 INJECTION, POWDER, LYOPHILIZED, FOR SOLUTION INTRAVENOUS at 01:30

## 2020-01-01 RX ADMIN — METOPROLOL SUCCINATE 150 MG: 100 TABLET, FILM COATED, EXTENDED RELEASE ORAL at 20:52

## 2020-01-01 RX ADMIN — ACETAMINOPHEN 650 MG: 325 TABLET ORAL at 04:00

## 2020-01-01 RX ADMIN — ATORVASTATIN CALCIUM 10 MG: 10 TABLET, FILM COATED ORAL at 20:35

## 2020-01-01 RX ADMIN — Medication 1 MG: at 03:35

## 2020-01-01 RX ADMIN — PIPERACILLIN AND TAZOBACTAM 3.38 G: 3; .375 INJECTION, POWDER, LYOPHILIZED, FOR SOLUTION INTRAVENOUS at 17:44

## 2020-01-01 RX ADMIN — Medication 50 MCG/HR: at 14:35

## 2020-01-01 RX ADMIN — BUDESONIDE AND FORMOTEROL FUMARATE DIHYDRATE 2 PUFF: 160; 4.5 AEROSOL RESPIRATORY (INHALATION) at 19:45

## 2020-01-01 RX ADMIN — ACETAMINOPHEN 650 MG: 325 TABLET ORAL at 20:26

## 2020-01-01 RX ADMIN — HYDRALAZINE HYDROCHLORIDE 10 MG: 10 TABLET, FILM COATED ORAL at 21:41

## 2020-01-01 RX ADMIN — FUROSEMIDE 60 MG: 10 INJECTION, SOLUTION INTRAMUSCULAR; INTRAVENOUS at 18:24

## 2020-01-01 RX ADMIN — METOPROLOL SUCCINATE 100 MG: 100 TABLET, FILM COATED, EXTENDED RELEASE ORAL at 11:13

## 2020-01-01 RX ADMIN — LOPERAMIDE HYDROCHLORIDE 2 MG: 2 CAPSULE ORAL at 10:00

## 2020-01-01 RX ADMIN — BUSPIRONE HYDROCHLORIDE 5 MG: 5 TABLET ORAL at 08:28

## 2020-01-01 RX ADMIN — IPRATROPIUM BROMIDE AND ALBUTEROL SULFATE 1 AMPULE: .5; 3 SOLUTION RESPIRATORY (INHALATION) at 18:17

## 2020-01-01 RX ADMIN — SODIUM CHLORIDE: 9 INJECTION, SOLUTION INTRAVENOUS at 12:46

## 2020-01-01 RX ADMIN — HYDRALAZINE HYDROCHLORIDE 10 MG: 10 TABLET, FILM COATED ORAL at 20:26

## 2020-01-01 RX ADMIN — ATORVASTATIN CALCIUM 10 MG: 10 TABLET, FILM COATED ORAL at 20:37

## 2020-01-01 RX ADMIN — PREGABALIN 75 MG: 75 CAPSULE ORAL at 11:12

## 2020-01-01 RX ADMIN — BUDESONIDE AND FORMOTEROL FUMARATE DIHYDRATE 2 PUFF: 160; 4.5 AEROSOL RESPIRATORY (INHALATION) at 19:58

## 2020-01-01 RX ADMIN — Medication 1 MG: at 03:53

## 2020-01-01 RX ADMIN — PROPOFOL 50 MCG/KG/MIN: 10 INJECTION, EMULSION INTRAVENOUS at 09:18

## 2020-01-01 RX ADMIN — DEXAMETHASONE SODIUM PHOSPHATE 6 MG: 4 INJECTION, SOLUTION INTRAMUSCULAR; INTRAVENOUS at 21:50

## 2020-01-01 RX ADMIN — BISACODYL 10 MG: 10 SUPPOSITORY RECTAL at 13:07

## 2020-01-01 RX ADMIN — INSULIN LISPRO 2 UNITS: 100 INJECTION, SOLUTION INTRAVENOUS; SUBCUTANEOUS at 14:57

## 2020-01-01 RX ADMIN — BACLOFEN 10 MG: 10 TABLET ORAL at 08:01

## 2020-01-01 RX ADMIN — FAMOTIDINE 20 MG: 20 TABLET, FILM COATED ORAL at 19:53

## 2020-01-01 RX ADMIN — CEFEPIME 2 G: 2 INJECTION, POWDER, FOR SOLUTION INTRAVENOUS at 21:35

## 2020-01-01 RX ADMIN — BUDESONIDE AND FORMOTEROL FUMARATE DIHYDRATE 2 PUFF: 160; 4.5 AEROSOL RESPIRATORY (INHALATION) at 10:05

## 2020-01-01 RX ADMIN — DEXMEDETOMIDINE 1.2 MCG/KG/HR: 100 INJECTION, SOLUTION, CONCENTRATE INTRAVENOUS at 20:05

## 2020-01-01 RX ADMIN — FUROSEMIDE 60 MG: 10 INJECTION, SOLUTION INTRAMUSCULAR; INTRAVENOUS at 11:20

## 2020-01-01 RX ADMIN — METOPROLOL TARTRATE 5 MG: 1 INJECTION, SOLUTION INTRAVENOUS at 08:10

## 2020-01-01 RX ADMIN — HYDRALAZINE HYDROCHLORIDE 10 MG: 10 TABLET, FILM COATED ORAL at 08:54

## 2020-01-01 RX ADMIN — SACUBITRIL AND VALSARTAN 1 TABLET: 49; 51 TABLET, FILM COATED ORAL at 20:52

## 2020-01-01 RX ADMIN — PREGABALIN 75 MG: 75 CAPSULE ORAL at 20:37

## 2020-01-01 RX ADMIN — ATORVASTATIN CALCIUM 10 MG: 10 TABLET, FILM COATED ORAL at 11:08

## 2020-01-01 RX ADMIN — INSULIN LISPRO 1 UNITS: 100 INJECTION, SOLUTION INTRAVENOUS; SUBCUTANEOUS at 10:33

## 2020-01-01 RX ADMIN — INSULIN GLARGINE 60 UNITS: 100 INJECTION, SOLUTION SUBCUTANEOUS at 08:13

## 2020-01-01 RX ADMIN — TIOTROPIUM BROMIDE INHALATION SPRAY 2 PUFF: 3.12 SPRAY, METERED RESPIRATORY (INHALATION) at 08:42

## 2020-01-01 RX ADMIN — BUDESONIDE AND FORMOTEROL FUMARATE DIHYDRATE 2 PUFF: 160; 4.5 AEROSOL RESPIRATORY (INHALATION) at 07:54

## 2020-01-01 RX ADMIN — SIMETHICONE 80 MG: 80 TABLET, CHEWABLE ORAL at 08:23

## 2020-01-01 RX ADMIN — INSULIN LISPRO 1 UNITS: 100 INJECTION, SOLUTION INTRAVENOUS; SUBCUTANEOUS at 20:21

## 2020-01-01 RX ADMIN — ATORVASTATIN CALCIUM 10 MG: 10 TABLET, FILM COATED ORAL at 21:42

## 2020-01-01 RX ADMIN — FAMOTIDINE 20 MG: 20 TABLET, FILM COATED ORAL at 08:24

## 2020-01-01 RX ADMIN — ALBUTEROL SULFATE 2.5 MG: 2.5 SOLUTION RESPIRATORY (INHALATION) at 17:35

## 2020-01-01 RX ADMIN — PIPERACILLIN AND TAZOBACTAM 3.38 G: 3; .375 INJECTION, POWDER, LYOPHILIZED, FOR SOLUTION INTRAVENOUS at 17:52

## 2020-01-01 RX ADMIN — PROPOFOL 35 MCG/KG/MIN: 10 INJECTION, EMULSION INTRAVENOUS at 05:13

## 2020-01-01 RX ADMIN — HYDRALAZINE HYDROCHLORIDE 10 MG: 10 TABLET, FILM COATED ORAL at 20:48

## 2020-01-01 RX ADMIN — PROPOFOL 40 MCG/KG/MIN: 10 INJECTION, EMULSION INTRAVENOUS at 01:24

## 2020-01-01 RX ADMIN — HYDRALAZINE HYDROCHLORIDE 5 MG: 20 INJECTION, SOLUTION INTRAMUSCULAR; INTRAVENOUS at 17:02

## 2020-01-01 RX ADMIN — CALCIUM GLUCONATE 1 G: 94 INJECTION, SOLUTION INTRAVENOUS at 03:53

## 2020-01-01 RX ADMIN — Medication 1000 UNITS: at 09:16

## 2020-01-01 RX ADMIN — METOPROLOL SUCCINATE 150 MG: 100 TABLET, FILM COATED, EXTENDED RELEASE ORAL at 21:50

## 2020-01-01 RX ADMIN — DEXAMETHASONE SODIUM PHOSPHATE 6 MG: 4 INJECTION, SOLUTION INTRAMUSCULAR; INTRAVENOUS at 21:41

## 2020-01-01 RX ADMIN — HYDRALAZINE HYDROCHLORIDE 10 MG: 10 TABLET, FILM COATED ORAL at 08:12

## 2020-01-01 RX ADMIN — METOPROLOL SUCCINATE 150 MG: 100 TABLET, FILM COATED, EXTENDED RELEASE ORAL at 08:53

## 2020-01-01 RX ADMIN — SPIRONOLACTONE 25 MG: 25 TABLET ORAL at 08:39

## 2020-01-01 RX ADMIN — Medication 1000 UNITS: at 08:10

## 2020-01-01 RX ADMIN — BUDESONIDE AND FORMOTEROL FUMARATE DIHYDRATE 2 PUFF: 80; 4.5 AEROSOL RESPIRATORY (INHALATION) at 20:31

## 2020-01-01 RX ADMIN — SACUBITRIL AND VALSARTAN 1 TABLET: 49; 51 TABLET, FILM COATED ORAL at 21:07

## 2020-01-01 RX ADMIN — ATORVASTATIN CALCIUM 10 MG: 10 TABLET, FILM COATED ORAL at 08:24

## 2020-01-01 RX ADMIN — Medication 15 ML: at 08:06

## 2020-01-01 RX ADMIN — FINASTERIDE 5 MG: 5 TABLET, FILM COATED ORAL at 08:12

## 2020-01-01 RX ADMIN — TIOTROPIUM BROMIDE INHALATION SPRAY 2 PUFF: 3.12 SPRAY, METERED RESPIRATORY (INHALATION) at 08:40

## 2020-01-01 RX ADMIN — BUDESONIDE AND FORMOTEROL FUMARATE DIHYDRATE 2 PUFF: 160; 4.5 AEROSOL RESPIRATORY (INHALATION) at 07:40

## 2020-01-01 RX ADMIN — CEFEPIME 2 G: 2 INJECTION, POWDER, FOR SOLUTION INTRAVENOUS at 22:01

## 2020-01-01 RX ADMIN — SACUBITRIL AND VALSARTAN 1 TABLET: 24; 26 TABLET, FILM COATED ORAL at 15:57

## 2020-01-01 RX ADMIN — CEFEPIME 2 G: 2 INJECTION, POWDER, FOR SOLUTION INTRAVENOUS at 09:06

## 2020-01-01 RX ADMIN — MODAFINIL 100 MG: 100 TABLET ORAL at 08:14

## 2020-01-01 RX ADMIN — HYDROCORTISONE: 25 CREAM TOPICAL at 21:07

## 2020-01-01 RX ADMIN — FINASTERIDE 5 MG: 5 TABLET, FILM COATED ORAL at 08:54

## 2020-01-01 RX ADMIN — DEXAMETHASONE SODIUM PHOSPHATE 6 MG: 4 INJECTION, SOLUTION INTRAMUSCULAR; INTRAVENOUS at 20:52

## 2020-01-01 RX ADMIN — Medication 30 ML: at 11:45

## 2020-01-01 RX ADMIN — FINASTERIDE 5 MG: 5 TABLET, FILM COATED ORAL at 08:36

## 2020-01-01 RX ADMIN — PREGABALIN 75 MG: 75 CAPSULE ORAL at 23:27

## 2020-01-01 RX ADMIN — PIPERACILLIN AND TAZOBACTAM 3.38 G: 3; .375 INJECTION, POWDER, LYOPHILIZED, FOR SOLUTION INTRAVENOUS at 02:26

## 2020-01-01 RX ADMIN — INSULIN GLARGINE 60 UNITS: 100 INJECTION, SOLUTION SUBCUTANEOUS at 21:57

## 2020-01-01 RX ADMIN — PANTOPRAZOLE SODIUM 40 MG: 40 TABLET, DELAYED RELEASE ORAL at 09:47

## 2020-01-01 RX ADMIN — BUDESONIDE AND FORMOTEROL FUMARATE DIHYDRATE 2 PUFF: 160; 4.5 AEROSOL RESPIRATORY (INHALATION) at 17:45

## 2020-01-01 RX ADMIN — ASPIRIN 81 MG: 81 TABLET ORAL at 08:28

## 2020-01-01 RX ADMIN — SIMETHICONE 80 MG: 80 TABLET, CHEWABLE ORAL at 23:35

## 2020-01-01 RX ADMIN — SPIRONOLACTONE 12.5 MG: 25 TABLET ORAL at 08:07

## 2020-01-01 RX ADMIN — Medication 50 MG: at 08:36

## 2020-01-01 RX ADMIN — PREGABALIN 75 MG: 75 CAPSULE ORAL at 08:14

## 2020-01-01 RX ADMIN — ACETAMINOPHEN 650 MG: 325 TABLET ORAL at 03:42

## 2020-01-01 RX ADMIN — SODIUM CHLORIDE: 9 INJECTION, SOLUTION INTRAVENOUS at 22:18

## 2020-01-01 RX ADMIN — SIMETHICONE 80 MG: 80 TABLET, CHEWABLE ORAL at 21:07

## 2020-01-01 RX ADMIN — SODIUM CHLORIDE, PRESERVATIVE FREE 10 ML: 5 INJECTION INTRAVENOUS at 21:07

## 2020-01-01 RX ADMIN — TAMSULOSIN HYDROCHLORIDE 0.4 MG: 0.4 CAPSULE ORAL at 08:52

## 2020-01-01 RX ADMIN — INSULIN LISPRO 1 UNITS: 100 INJECTION, SOLUTION INTRAVENOUS; SUBCUTANEOUS at 21:27

## 2020-01-01 RX ADMIN — METOPROLOL TARTRATE 5 MG: 5 INJECTION INTRAVENOUS at 18:22

## 2020-01-01 RX ADMIN — HYDRALAZINE HYDROCHLORIDE 10 MG: 10 TABLET, FILM COATED ORAL at 08:36

## 2020-01-01 RX ADMIN — PREGABALIN 75 MG: 75 CAPSULE ORAL at 21:02

## 2020-01-01 RX ADMIN — METOPROLOL TARTRATE 50 MG: 100 TABLET, FILM COATED ORAL at 22:06

## 2020-01-01 RX ADMIN — INSULIN LISPRO 1 UNITS: 100 INJECTION, SOLUTION INTRAVENOUS; SUBCUTANEOUS at 22:08

## 2020-01-01 RX ADMIN — Medication 1 MG: at 03:59

## 2020-01-01 RX ADMIN — Medication 50 MG: at 08:10

## 2020-01-01 RX ADMIN — ATORVASTATIN CALCIUM 10 MG: 10 TABLET, FILM COATED ORAL at 21:07

## 2020-01-01 RX ADMIN — PREGABALIN 75 MG: 75 CAPSULE ORAL at 22:36

## 2020-01-01 RX ADMIN — FUROSEMIDE 40 MG: 10 INJECTION, SOLUTION INTRAMUSCULAR; INTRAVENOUS at 00:16

## 2020-01-01 RX ADMIN — FUROSEMIDE 20 MG: 20 TABLET ORAL at 08:36

## 2020-01-01 RX ADMIN — REMDESIVIR 200 MG: 5 INJECTION INTRAVENOUS at 22:49

## 2020-01-01 RX ADMIN — PROPOFOL 50 MCG/KG/MIN: 10 INJECTION, EMULSION INTRAVENOUS at 15:39

## 2020-01-01 RX ADMIN — ASPIRIN 81 MG: 81 TABLET ORAL at 08:48

## 2020-01-01 RX ADMIN — BUSPIRONE HYDROCHLORIDE 5 MG: 5 TABLET ORAL at 22:04

## 2020-01-01 RX ADMIN — METOPROLOL SUCCINATE 100 MG: 100 TABLET, FILM COATED, EXTENDED RELEASE ORAL at 21:41

## 2020-01-01 RX ADMIN — LOPERAMIDE HYDROCHLORIDE 2 MG: 2 CAPSULE ORAL at 17:45

## 2020-01-01 RX ADMIN — Medication 50 MG: at 08:07

## 2020-01-01 RX ADMIN — MULTIPLE VITAMINS W/ MINERALS TAB 1 TABLET: TAB at 09:15

## 2020-01-01 RX ADMIN — TIOTROPIUM BROMIDE INHALATION SPRAY 2 PUFF: 3.12 SPRAY, METERED RESPIRATORY (INHALATION) at 09:08

## 2020-01-01 RX ADMIN — ONDANSETRON 4 MG: 2 INJECTION INTRAMUSCULAR; INTRAVENOUS at 22:08

## 2020-01-01 RX ADMIN — INSULIN GLARGINE 20 UNITS: 100 INJECTION, SOLUTION SUBCUTANEOUS at 20:36

## 2020-01-01 RX ADMIN — SODIUM CHLORIDE, PRESERVATIVE FREE 10 ML: 5 INJECTION INTRAVENOUS at 21:43

## 2020-01-01 RX ADMIN — Medication 1 MG: at 03:29

## 2020-01-01 RX ADMIN — VANCOMYCIN HYDROCHLORIDE 1500 MG: 1 INJECTION, POWDER, LYOPHILIZED, FOR SOLUTION INTRAVENOUS at 00:47

## 2020-01-01 RX ADMIN — INSULIN GLARGINE 23 UNITS: 100 INJECTION, SOLUTION SUBCUTANEOUS at 21:44

## 2020-01-01 RX ADMIN — INSULIN GLARGINE 60 UNITS: 100 INJECTION, SOLUTION SUBCUTANEOUS at 21:08

## 2020-01-01 RX ADMIN — SODIUM CHLORIDE, PRESERVATIVE FREE 10 ML: 5 INJECTION INTRAVENOUS at 20:56

## 2020-01-01 ASSESSMENT — PAIN SCALES - GENERAL
PAINLEVEL_OUTOF10: 4
PAINLEVEL_OUTOF10: 0
PAINLEVEL_OUTOF10: 1
PAINLEVEL_OUTOF10: 1
PAINLEVEL_OUTOF10: 0
PAINLEVEL_OUTOF10: 4
PAINLEVEL_OUTOF10: 0
PAINLEVEL_OUTOF10: 2
PAINLEVEL_OUTOF10: 0
PAINLEVEL_OUTOF10: 0
PAINLEVEL_OUTOF10: 4
PAINLEVEL_OUTOF10: 0
PAINLEVEL_OUTOF10: 0
PAINLEVEL_OUTOF10: 4
PAINLEVEL_OUTOF10: 0
PAINLEVEL_OUTOF10: 8
PAINLEVEL_OUTOF10: 0
PAINLEVEL_OUTOF10: 0
PAINLEVEL_OUTOF10: 4
PAINLEVEL_OUTOF10: 2
PAINLEVEL_OUTOF10: 0

## 2020-01-01 ASSESSMENT — ENCOUNTER SYMPTOMS
COUGH: 0
DIARRHEA: 1
SHORTNESS OF BREATH: 1
ABDOMINAL PAIN: 0
BACK PAIN: 0
SHORTNESS OF BREATH: 1
ABDOMINAL DISTENTION: 0
VOMITING: 0
SORE THROAT: 0
ABDOMINAL DISTENTION: 0
NAUSEA: 1
BACK PAIN: 0
SORE THROAT: 0
SHORTNESS OF BREATH: 1
DIARRHEA: 0
EYE REDNESS: 0
CONSTIPATION: 0
VOMITING: 0
DIARRHEA: 0
VOICE CHANGE: 0
COUGH: 0
RECTAL PAIN: 0
VOICE CHANGE: 0
SINUS PRESSURE: 0
BLOOD IN STOOL: 0
DIARRHEA: 0
SHORTNESS OF BREATH: 0
CHEST TIGHTNESS: 0
ANAL BLEEDING: 0
CONSTIPATION: 0
VOMITING: 1
TROUBLE SWALLOWING: 0
ABDOMINAL PAIN: 0
CONSTIPATION: 0
EYES NEGATIVE: 1
NAUSEA: 0
NAUSEA: 0
WHEEZING: 0
BACK PAIN: 0
EYE REDNESS: 0
NAUSEA: 0
CHEST TIGHTNESS: 0
WHEEZING: 1
COLOR CHANGE: 0
DIARRHEA: 0
COUGH: 0
ABDOMINAL DISTENTION: 0
ABDOMINAL PAIN: 0
VOMITING: 0
EYES NEGATIVE: 1
SINUS PRESSURE: 0
CHEST TIGHTNESS: 0
SORE THROAT: 0
CHEST TIGHTNESS: 0
ABDOMINAL PAIN: 1
WHEEZING: 0
TROUBLE SWALLOWING: 0
TROUBLE SWALLOWING: 0
EYE PAIN: 0
NAUSEA: 0
COUGH: 1
ABDOMINAL PAIN: 0
ABDOMINAL PAIN: 0
COLOR CHANGE: 0
COUGH: 1
CONSTIPATION: 0
COLOR CHANGE: 0
DIARRHEA: 0
SHORTNESS OF BREATH: 1
WHEEZING: 0
SHORTNESS OF BREATH: 1
BACK PAIN: 0
DIARRHEA: 1
WHEEZING: 0
COUGH: 0
COUGH: 1
BLOOD IN STOOL: 0
WHEEZING: 1
NAUSEA: 1
CHEST TIGHTNESS: 0
SINUS PRESSURE: 0
CONSTIPATION: 0
NAUSEA: 0
RHINORRHEA: 0
SHORTNESS OF BREATH: 1
VOMITING: 0
VOMITING: 0
SHORTNESS OF BREATH: 0
ABDOMINAL PAIN: 0
VOMITING: 0

## 2020-01-01 ASSESSMENT — PULMONARY FUNCTION TESTS
PIF_VALUE: 14
PIF_VALUE: 30
PIF_VALUE: 22
PIF_VALUE: 20
PIF_VALUE: 16
PIF_VALUE: 17
PIF_VALUE: 22
PIF_VALUE: 15
PIF_VALUE: 14
PIF_VALUE: 15
PIF_VALUE: 15
PIF_VALUE: 14
PIF_VALUE: 15
PIF_VALUE: 19
PIF_VALUE: 12
PIF_VALUE: 26
PIF_VALUE: 15
PIF_VALUE: 14
PIF_VALUE: 22
PIF_VALUE: 12
PIF_VALUE: 14
PIF_VALUE: 14
PIF_VALUE: 15
PIF_VALUE: 16
PIF_VALUE: 14
PIF_VALUE: 12
PIF_VALUE: 14
PIF_VALUE: 19
PIF_VALUE: 14
PIF_VALUE: 15
PIF_VALUE: 14
PIF_VALUE: 13
PIF_VALUE: 14
PIF_VALUE: 13
PIF_VALUE: 14
PIF_VALUE: 38
PIF_VALUE: 15
PIF_VALUE: 17
PIF_VALUE: 14

## 2020-01-01 ASSESSMENT — PAIN DESCRIPTION - PAIN TYPE
TYPE: ACUTE PAIN
TYPE: ACUTE PAIN

## 2020-01-01 ASSESSMENT — PAIN DESCRIPTION - ONSET: ONSET: ON-GOING

## 2020-01-01 ASSESSMENT — PAIN DESCRIPTION - FREQUENCY: FREQUENCY: CONTINUOUS

## 2020-01-01 ASSESSMENT — PAIN DESCRIPTION - DESCRIPTORS
DESCRIPTORS: ACHING
DESCRIPTORS: HEADACHE

## 2020-01-01 ASSESSMENT — PAIN DESCRIPTION - ORIENTATION: ORIENTATION: MID

## 2020-01-01 ASSESSMENT — PAIN DESCRIPTION - LOCATION
LOCATION: HEAD
LOCATION: ABDOMEN

## 2020-01-13 NOTE — PROGRESS NOTES
[facility]     Patient: Ana Smith, 71 y.o.   : 1955    Pt of Dr. Malcom Benedict   Patient presents with    Follow-up     pt requested appt due to sob last 4-5 months    Other     neck  18 mp 4        HPI  Pamela Last is here for c/o increased dyspnea. When I saw him in Sept he was going great, rarely using his rescue inhaler and exercising regularly - now has not been able to exercise 2/2 sweating and SOB    He admits to shortness of breath. His shortness of breath is getting worse for the last 2 months,  His shortness of breath is gradual in onset. Started about 2 months ago, saw PCP and was diagnosed with URI took an antibiotic, took away the wheezing and cough  but not the fatigue, sweating or SOB. He denies orthopnea. He denies paroxysmal nocturnal dyspnea. He admits to decline in his  functional status due to shortness of breath in the recent past. His current functional status is 1 blocks on level ground, would cause perfuse sweating, denies chest pain, arm or back pain. On CPAP at night for JOSE A tx. PMH anemia, has had iron infusions with a hematologist.   Denies any cardiac history. Denies any family history of cardiac disease   Sister and brother have COPD, A1A MM (normal) - both were smokers . He is former smoker, 15 PYH quit in [address] [facility] he may have a history of thyroid disease. States a  in past was giving him a pill and he thinks it was for his thyroid but he lost contact that   When living in [place]. He denies any recent travel, denies any h/o DV/PE, denies any family h/o bleeding or clotting disorders.    Past Medical hx   PMH:  Past Medical History:   Diagnosis Date    Anxiety     Arthritis     in foot     [facility]     Depression     Hyperlipidemia     Hypertension     Hypogonadism male     Migraine     Neuralgia     Sleep apnea     Testosterone deficiency in male     [facility]      SURGICAL HISTORY:  Past Surgical History:   Procedure Laterality Date    EYE SURGERY Right 2019    HERNIA REPAIR       SOCIAL HISTORY:  Social History     Tobacco Use    Smoking status: Former Smoker     Packs/day: 1.00     Years: 15.00     Pack years: 15.00     Types: Cigarettes     Start date: 1977     Last attempt to quit: 1992     Years since quittin.0    Smokeless tobacco: Never Used   Substance Use Topics    Alcohol use: Not on file    Drug use: Not on file     ALLERGIES:No Known Allergies  FAMILY HISTORY:No family history on file. CURRENT MEDICATIONS:  Current Outpatient Medications   Medication Sig Dispense Refill    UNABLE TO FIND Pt takes a pill for cough for about two weeks not sure of the name      Umeclidinium Bromide (INCRUSE ELLIPTA) 62.5 MCG/INH AEPB inhalation daily 30 each 5    fluticasone-salmeterol (ADVAIR DISKUS) 250-50 MCG/DOSE AEPB Inhale 1 puff into the lungs every 12 hours 60 each 3    zolpidem (AMBIEN) 10 MG tablet Take by mouth nightly as needed for Sleep. Tommas Pean aspirin 81 MG EC tablet Take 81 mg by mouth daily      baclofen (LIORESAL) 10 MG tablet Take 10 mg by mouth 3 times daily      busPIRone (BUSPAR) 5 MG tablet Take 5 mg by mouth 3 times daily       dutasteride (AVODART) 0.5 MG capsule Take 0.5 mg by mouth daily      Handicap Placard MISC by Does not apply route      homatropine 5 % ophthalmic solution 1 drop every hour       losartan (COZAAR) 50 MG tablet Take 50 mg by mouth daily      pregabalin (LYRICA) 75 MG capsule Take 75 mg by mouth 2 times daily. Tommas Pean metFORMIN (GLUCOPHAGE-XR) 500 MG extended release tablet Take 500 mg by mouth daily (with breakfast)      metoprolol (LOPRESSOR) 100 MG tablet Take 100 mg by mouth 2 times daily      modafinil (PROVIGIL) 100 MG tablet Take 100 mg by mouth daily. Tommas Pean omeprazole (PRILOSEC) 20 MG delayed release capsule Take 20 mg by Exam  Vitals signs and nursing note reviewed. Constitutional:       General: He is not in acute distress. Appearance: He is well-developed and overweight. HENT:      Mouth/Throat:      Pharynx: No oropharyngeal exudate. Eyes:      General: No scleral icterus. Right eye: No discharge. Conjunctiva/sclera: Conjunctivae normal.   Neck:      Musculoskeletal: Neck supple. Vascular: No JVD. Cardiovascular:      Rate and Rhythm: Normal rate and regular rhythm. Heart sounds: No murmur. No friction rub. Pulmonary:      Effort: Pulmonary effort is normal. No accessory muscle usage or respiratory distress. Breath sounds: Normal breath sounds and air entry. No wheezing or rales. Chest:      Chest wall: No tenderness. Abdominal:      Palpations: Abdomen is soft. Musculoskeletal:         General: No tenderness. Lymphadenopathy:      Cervical: No cervical adenopathy. Skin:     General: Skin is warm and moist.      Capillary Refill: Capillary refill takes less than 2 seconds. Findings: No erythema. Nails: There is no clubbing. Neurological:      Mental Status: He is alert and oriented to person, place, and time. Psychiatric:         Behavior: Behavior normal.         Thought Content: Thought content normal.         Judgment: Judgment normal.          Test results   Lung Nodule Screening     [] Qualifies    [x]Does not qualify   [] Declined    [] Completed             ECHO 1/17/2019   Conclusions      Summary   Left ventricle size is normal.   Mildly increased left ventricle wall thickness. There was severe global hypokinesis of the left ventricle. Systolic function was severely reduced. Ejection fraction is visually estimated in the range of 30% to 35%. Doppler parameters were consistent with abnormal left ventricular   relaxation (grade 1 diastolic dysfunction). The left atrium is Mildly dilated.       Signature changes to call 911     Electronically signed by KRYSTAL Echevarria CNP on 1/13/2020 at 3:31 PM

## 2020-01-16 NOTE — PROGRESS NOTES
Pt here for NP appointment. EKG done today. Pt c/o SOB on exertion, lightheadedness, dizziness. Pt states he feels very hot all the time. Pt denies chest pain, palpitations.

## 2020-01-16 NOTE — PROGRESS NOTES
[address] [place] [address],[place] [id number]  Dept: [phone]  Dept Fax: [phone]  Loc: [phone]    Visit Date: 2020    Felipe Curran is a 71 y.o. male who presents todayfor:  Chief Complaint   Patient presents with    New Patient    Shortness of Breath    Hypertension    Diabetes   here for the first time  Referred by pulmonary   Does have multiple symptoms  More fatigue  More sweating and hot feeling  Exertional dyspnea  Dyspnea as baseline due to COPD  Worse lately than usual  Atypical chest symptoms  No other triggers   Did have smoking history   Chemical exposure  Does have DM since [address] to high numbers  No known CAD before  Does have HTN   No cath before  Family history of CAD      HPI:  HPI  Past Medical History:   Diagnosis Date    Anxiety     Arthritis     in foot     [facility] disease) [facility]     Depression     Hyperlipidemia     Hypertension     Hypogonadism male     Migraine     Neuralgia     Sleep apnea     Testosterone deficiency in male     Type 2 diabetes mellitus ([facility]       Past Surgical History:   Procedure Laterality Date    EYE SURGERY Right 2019    HERNIA REPAIR       History reviewed. No pertinent family history.   Social History     Tobacco Use    Smoking status: Former Smoker     Packs/day: 1.00     Years: 15.00     Pack years: 15.00     Types: Cigarettes     Start date: 1977     Last attempt to quit: 1992     Years since quittin.0    Smokeless tobacco: Never Used   Substance Use Topics    Alcohol use: Not on file      Current Outpatient Medications   Medication Sig Dispense Refill    UNABLE TO FIND Pt takes a pill for cough for about two weeks not sure of the name      Umeclidinium Bromide (INCRUSE ELLIPTA) 62.5 MCG/INH AEPB inhalation daily 30 each 5    fluticasone-salmeterol (ADVAIR DISKUS) 250-50 MCG/DOSE AEPB Inhale 1 puff into the lungs every 12 hours 60 each 3    zolpidem (AMBIEN) 10 MG tablet Take by mouth nightly as needed for Sleep. Eli Orange aspirin 81 MG EC tablet Take 81 mg by mouth daily      baclofen (LIORESAL) 10 MG tablet Take 10 mg by mouth 3 times daily      busPIRone (BUSPAR) 5 MG tablet Take 5 mg by mouth 3 times daily       dutasteride (AVODART) 0.5 MG capsule Take 0.5 mg by mouth daily      Handicap Placard MISC by Does not apply route      homatropine 5 % ophthalmic solution 1 drop every hour       losartan (COZAAR) 50 MG tablet Take 50 mg by mouth daily      pregabalin (LYRICA) 75 MG capsule Take 75 mg by mouth 2 times daily. Eli Orange metFORMIN (GLUCOPHAGE-XR) 500 MG extended release tablet Take 500 mg by mouth daily (with breakfast)      metoprolol (LOPRESSOR) 100 MG tablet Take 100 mg by mouth 2 times daily      modafinil (PROVIGIL) 100 MG tablet Take 100 mg by mouth daily. Eli Orange omeprazole (PRILOSEC) 20 MG delayed release capsule Take 20 mg by mouth 4 times daily      pioglitazone (ACTOS) 30 MG tablet Take 30 mg by mouth daily      Respiratory Therapy Supplies (NEBULIZER/ADULT MASK) KIT by Does not apply route      Testosterone (TESTOPEL) 75 MG PLLT by Implant route. Eli Orange insulin glargine (TOUJEO SOLOSTAR) 300 UNIT/ML injection pen Inject into the skin nightly      blood glucose test strips (TRUE METRIX BLOOD GLUCOSE TEST) strip 1 each by In Vitro route daily As needed.  albuterol sulfate  (90 Base) MCG/ACT inhaler Inhale 2 puffs into the lungs every 6 hours as needed for Wheezing      Liraglutide (VICTOZA) 18 MG/3ML SOPN SC injection Inject 1.2 mg into the skin daily       No current facility-administered medications for this visit.       No Known Allergies  Health Maintenance   Topic Date Due    AAA screen  1955    DTaP/Tdap/Td vaccine (1 - Tdap) 05/06/1965    Lipid screen  05/06/1994    Diabetes screen  05/06/1994    Colon cancer screen colonoscopy  05/06/2004    Pneumococcal 65+ years Vaccine (1 of 1 - PPSV23) 05/06/2019    Annual Wellness Visit (AWV)  06/23/2019    Potassium monitoring  10/17/2020    Creatinine monitoring  10/17/2020    Flu vaccine  Completed    Shingles Vaccine  Completed    Hepatitis C screen  Completed    HIV screen  Completed       Subjective:  Review of Systems   Constitutional: Positive for fatigue. HENT: Negative for ear pain and mouth sores. Respiratory: Positive for shortness of breath. Negative for chest tightness. Cardiovascular: Negative for chest pain, palpitations and leg swelling. Gastrointestinal: Negative for abdominal distention, abdominal pain, anal bleeding, blood in stool, constipation, diarrhea, nausea, rectal pain and vomiting. Genitourinary: Negative for dysuria, hematuria and urgency. Musculoskeletal: Negative for arthralgias, back pain, gait problem, joint swelling, myalgias, neck pain and neck stiffness. Skin: Negative for color change, pallor and rash. Neurological: Negative for dizziness, syncope and light-headedness. Hematological: Negative for adenopathy. Psychiatric/Behavioral: Negative for behavioral problems, confusion, decreased concentration, dysphoric mood and hallucinations. The patient is not nervous/anxious and is not hyperactive. Objective:  Physical Exam  HENT:      Head: Normocephalic. Nose: Nose normal.      Mouth/Throat:      Mouth: Mucous membranes are moist.   Eyes:      Extraocular Movements: Extraocular movements intact. Pupils: Pupils are equal, round, and reactive to light. Neck:      Musculoskeletal: Normal range of motion. Cardiovascular:      Rate and Rhythm: Normal rate and regular rhythm. Pulses: Normal pulses. Heart sounds: Murmur present. No friction rub. No gallop. Pulmonary:      Effort: No respiratory distress. Breath sounds: No stridor. No wheezing, rhonchi or rales. Chest:      Chest wall: No tenderness.    Abdominal:      General: There is no distension. Palpations: There is no mass. Tenderness: There is no tenderness. There is no right CVA tenderness, left CVA tenderness, guarding or rebound. Hernia: No hernia is present. Musculoskeletal:         General: No swelling, tenderness, deformity or signs of injury. Right lower leg: No edema. Left lower leg: No edema. Skin:     Coloration: Skin is not jaundiced or pale. Findings: No bruising, erythema, lesion or rash. Neurological:      Cranial Nerves: No cranial nerve deficit. Sensory: No sensory deficit. Motor: No weakness. Coordination: Coordination normal.      Gait: Gait normal.      Deep Tendon Reflexes: Reflexes normal.   Psychiatric:         Mood and Affect: Mood normal.         Thought Content: Thought content normal.       /82   Pulse 91   Ht 5' 10\" (1.778 m)   Wt 217 lb 6.4 oz (98.6 kg)   BMI 31.19 kg/m²     Assessment:      Diagnosis Orders   1. SOB (shortness of breath)  EKG 12 lead   2. Essential hypertension     3. Familial hypercholesterolemia     concerning patient   Higher risk patient  Abnormal ECG   Likely underlying ischemic heart disease  ECG in office was done today. I reviewed the ECG. Abnormal ECG       Plan:  No follow-ups on file. Discussed  Options discussed   Cath vs stress test   More than 25 years of DM is certainly concerning   Cardiac cathterizaion, risks and benefits discussed with the patient and family at length. Patient was agreeable. Echo  Continue risk factor modification and medical management  Thank you for allowing me to participate in the care of your patient. Please don't hesitate to contact me regarding any further issues related to the patient care      Orders Placed:  Orders Placed This Encounter   Procedures    EKG 12 lead     Order Specific Question:   Reason for Exam?     Answer: Other       Medications Prescribed:  No orders of the defined types were placed in this encounter.          Discussed use, benefit, and side effects of prescribed medications. All patient questions answered. Pt voicedunderstanding. Instructed to continue current medications, diet and exercise. Continue risk factor modification and medical management. Patient agreed with treatment plan. Follow up as directed.     Electronically signedby Aidan Blount MD on 1/16/2020 at 8:49 AM

## 2020-01-20 NOTE — TELEPHONE ENCOUNTER
Per Dr. Bajwa Marking patient called cath lab to reschedule cath today due to not feeling well. Qwen in scheduling notified to call patient to reschedule cath.

## 2020-01-29 NOTE — PROGRESS NOTES
Returned to [phone]. Monitor attached showing SR. Vasc-band in place to right wrist.  No bleeding, swelling, or edema noted.   0.9nss infusing with approx 500 ml remaining

## 2020-01-29 NOTE — H&P
Once, Mateo Baugh PA-C    nitroGLYCERIN (NITROSTAT) SL tablet 0.4 mg, 0.4 mg, Sublingual, Q5 Min PRN, Mateo Baugh PA-C    sodium chloride flush 0.9 % injection 10 mL, 10 mL, Intravenous, 2 times per day, Mateo Baugh PA-C    sodium chloride flush 0.9 % injection 10 mL, 10 mL, Intravenous, PRN, Mateo Baugh PA-C  Prior to Admission medications    Medication Sig Start Date End Date Taking? Authorizing Provider   losartan-hydrochlorothiazide (HYZAAR) 50-12.5 MG per tablet Take 1 tablet by mouth daily   Yes Historical Provider, MD   Multiple Vitamins-Minerals (MULTIVITAMIN ADULT PO) Take 1 tablet by mouth daily   Yes Historical Provider, MD   Umeclidinium Bromide (INCRUSE ELLIPTA) 62.5 MCG/INH AEPB inhalation daily 11/19/19  Yes Alejandra Chen MD   fluticasone-salmeterol (ADVAIR DISKUS) 250-50 MCG/DOSE AEPB Inhale 1 puff into the lungs every 12 hours 3/5/19  Yes Alejandra Chen MD   zolpidem (AMBIEN) 10 MG tablet Take by mouth nightly as needed for Sleep. .   Yes Historical Provider, MD   baclofen (LIORESAL) 10 MG tablet Take 10 mg by mouth daily    Yes Historical Provider, MD   busPIRone (BUSPAR) 5 MG tablet Take 5 mg by mouth 3 times daily    Yes Historical Provider, MD   dutasteride (AVODART) 0.5 MG capsule Take 0.5 mg by mouth daily   Yes Historical Provider, MD   Handicap Placard MISC by Does not apply route   Yes Historical Provider, MD   pregabalin (LYRICA) 75 MG capsule Take 75 mg by mouth 2 times daily. .   Yes Historical Provider, MD   metFORMIN (GLUCOPHAGE-XR) 500 MG extended release tablet Take 500 mg by mouth 2 times daily    Yes Historical Provider, MD   metoprolol (LOPRESSOR) 100 MG tablet Take 100 mg by mouth 2 times daily   Yes Historical Provider, MD   modafinil (PROVIGIL) 100 MG tablet Take 100 mg by mouth daily. .   Yes Historical Provider, MD   omeprazole (PRILOSEC) 20 MG delayed release capsule Take 20 mg by mouth every other day    Yes Historical Provider,

## 2020-01-29 NOTE — PROGRESS NOTES
Patient and spouse verbalize understanding of procedural delay. Patient states sugar 81 per self monitor .   pb crackers and juice given

## 2020-01-29 NOTE — PROGRESS NOTES
Discharged home in stable condition. Patient and family verbalize understanding of discharge instructions and follow-up.

## 2020-01-29 NOTE — PROCEDURES
[address] [place] [address]                            CARDIAC CATHETERIZATION    PATIENT NAME: Albino Mo                    :        1955  MED REC NO:   [id number]                           ROOM:       [id number]  ACCOUNT NO:   [de-identified]                           ADMIT DATE: 2020  PROVIDER:     Vanessa Galeano M.D.    Garfield Eusebia:  2020    CLINICAL HISTORY AND INDICATION:  This is a very pleasant gentleman who  has had symptoms of worsening shortness of breath, has had history of  significant risk factors for coronary artery disease, diabetes for many  years, hypertension, hyperlipidemia. Due to his significant symptoms  that were very suggestive for possible unstable angina, he was referred  for cardiac cath without noninvasive workup to evaluate coronary  anatomy. PROCEDURE PERFORMED:  1. Left heart cath, LV gram.  2.  Coronary angiogram, right and left. 3.  Sedation, 2 of Versed, 100 of fentanyl between 02:00 and 02:30 p.m.  in my presence under my supervision. 4.  Blood loss less than 10 mL. PROCEDURE DETAILS:  Please refer to my catheterization detailed note. HEMODYNAMIC RESULTS AND LEFT VENTRICULOGRAM:  Left ventricular  end-diastolic pressure was 12 mmHg with no significant change before and  after contrast injection. LV function was normal, EF 60%. CORONARY ARTERIOGRAM RESULTS:  1. Left main is patent, gives rise to LAD and left circumflex. 2.  LAD pretty much patent. 3.  Left circumflex artery is large, dominant, and patent. 4.  Right coronary artery is small, nondominant, patent with minimal  luminal irregularity. CONCLUSION:  1. No significant coronary artery disease was noted with nondominant  right system. 2.  Normal LV function. 3.  No complication. RECOMMENDATIONS:  At this point, continuing medical treatment, risk  factor modification is recommended.   Evaluation for other causes of the  patient's symptoms primarily pulmonary causes and sleep apnea is  recommended along with risk factor modification and medical management.                 Consuelo Byers M.D.    D: 01/29/2020 14:45:27       T: 01/29/2020 16:02:21     [email or web address]  Job#: [id number]     Doc#: [id number]    CC:

## 2020-03-23 NOTE — TELEPHONE ENCOUNTER
Pt called does not want to come to appt right now asking if there is something he needs to be doing as far as medication   States he is feeling fine just a little tired but is also anemic     Pt will also need advised on new appt 5/6 at 2

## 2020-03-24 NOTE — TELEPHONE ENCOUNTER
I am not sure what the question is   I get it that he doesn't want to come   But what is the question that I need to answer about the meds ? ?

## 2020-08-24 NOTE — TELEPHONE ENCOUNTER
Alicia Meza is using breathing medications Advair, Incruse, and albuterol. Is currently having increased SOB. He has noticed whenever he is trying to do anything with his grandson or things around his home he is gets very winded. He is wondering if he needs a different medication? Please advise.

## 2020-08-24 NOTE — TELEPHONE ENCOUNTER
He is on good therapy, already on triple therapy which is max for the inhales. We could switch them to different brands, but I do not feel that will help much. If he is currently having cough, increased chest tightness, or wheezing we could add oral steroids for 5 days, if just SOB, he needs to see cardiology if he has not already done so, and can schedule in office appt with me for further evaluation if needed.

## 2020-08-25 NOTE — TELEPHONE ENCOUNTER
I called and spoke with Asael Keita, he is experiencing Wheezing most when he is taking a deep breath. He would like to try the oral steroid x 5 days. If he does not have any relief from steroid then he would like an appointment to see you. He also has had cardiology work up. Please advise.

## 2020-08-26 NOTE — TELEPHONE ENCOUNTER
Elda Troy is also in need of a refill of his Incruse to be sent to [facility]. Please advise.

## 2020-10-03 PROBLEM — J96.90 RESPIRATORY FAILURE (HCC): Status: ACTIVE | Noted: 2020-01-01

## 2020-10-03 NOTE — ED PROVIDER NOTES
[place] ENCOUNTER          Pt Name: Manuel Salamanca  MRN: [id number]  [place] 1955  Date of evaluation: 10/3/2020  Treating Resident Physician: Magdy Dillon MD  Supervising Physician: Dr. Liam Sullivan       Chief Complaint   Patient presents with    Shortness of Breath     History obtained from chart review and the patient. HISTORY OF PRESENT ILLNESS    HPI  Manuel Salamanca is a 76 y.o. male who presents to the emergency department for evaluation of SOB. Patient states that for the past several weeks he has been feeling more more short of breath. States his been taking his home inhalers which provides some relief. Patient states that this a.m., his shortness of breath started to get much worse. Patient having a hard time breathing and answering questions. Patient's initial oxygen saturation on room air was 76%. Patient denies any history of heart problems. Patient does have a history of COPD. The patient has no other acute complaints at this time. REVIEW OF SYSTEMS   Review of Systems   Constitutional: Negative for chills and fever. Respiratory: Positive for cough, shortness of breath and wheezing. Cardiovascular: Negative for chest pain.    Limited due to severity of patient's status    PAST MEDICAL AND SURGICAL HISTORY     Past Medical History:   Diagnosis Date    Acid reflux     Anxiety     Arthritis     in foot     Asthma     Change in bowel habits     Constipation     COPD (chronic obstructive pulmonary disease) (HCC)     COPD (chronic obstructive pulmonary disease) (HCC)     Depression     Diarrhea     Heartburn     Hyperlipidemia     Hypertension     Hypogonadism male     Loss of vision     Migraine     Neuralgia     Shortness of breath     Sleep apnea     Testosterone deficiency in male     Type 2 diabetes mellitus ([facility] [address]      Past Surgical History:   Procedure Laterality Date    CARDIAC CATHETERIZATION      EYE SURGERY Right 08/2019    HERNIA REPAIR         MEDICATIONS     Current Facility-Administered Medications:     0.9 % sodium chloride infusion, 1,000 mL, Intravenous, Continuous, Jane Mccabe MD, Last Rate: 125 mL/hr at 10/03/20 1800, 1,000 mL at 10/03/20 1800    methylPREDNISolone sodium (SOLU-MEDROL) injection 125 mg, 125 mg, Intravenous, Daily, Jane Mccabe MD, 125 mg at 10/03/20 1814    propofol injection, 20 mcg/kg/min, Intravenous, Continuous, Fermin Clemente MD, Last Rate: 21.9 mL/hr at 10/03/20 2054, 35 mcg/kg/min at 10/03/20 2054    piperacillin-tazobactam (ZOSYN) 3.375 g in dextrose 5 % 50 mL IVPB (mini-bag), 3.375 g, Intravenous, Once, Fermin Clemente MD    vancomycin (VANCOCIN) 1,500 mg in dextrose 5 % 500 mL IVPB, 1,500 mg, Intravenous, Once, Fermin Clemente MD, Last Rate: 250 mL/hr at 10/03/20 2045, 1,500 mg at 10/03/20 2045    Current Outpatient Medications:     Umeclidinium Bromide (INCRUSE ELLIPTA) 62.5 MCG/INH AEPB, inhalation daily, Disp: 30 each, Rfl: 5    ammonium lactate (AMLACTIN) 12 % cream, Apply topically as needed for Dry Skin Apply topically as needed. , Disp: , Rfl:     atorvastatin (LIPITOR) 10 MG tablet, Take 10 mg by mouth 3 times daily Take one tablet by mouth three times daily, Disp: , Rfl:     insulin aspart (NOVOLOG FLEXPEN) 100 UNIT/ML injection pen, Inject into the skin 3 times daily (before meals), Disp: , Rfl:     tamsulosin (FLOMAX) 0.4 MG capsule, Take 0.4 mg by mouth daily, Disp: , Rfl:     losartan-hydrochlorothiazide (HYZAAR) 50-12.5 MG per tablet, Take 1 tablet by mouth daily, Disp: , Rfl:     Multiple Vitamins-Minerals (MULTIVITAMIN ADULT PO), Take 1 tablet by mouth daily, Disp: , Rfl:     fluticasone-salmeterol (ADVAIR DISKUS) 250-50 MCG/DOSE AEPB, Inhale 1 puff into the lungs every 12 hours, Disp: 60 each, Rfl: 3    zolpidem (AMBIEN) 10 MG tablet, Take by mouth nightly as needed for Sleep. ., Disp: , Rfl:     aspirin 81 MG EC tablet, Take 81 mg by mouth daily, Disp: , Rfl:     baclofen (LIORESAL) 10 MG tablet, Take 10 mg by mouth daily , Disp: , Rfl:     busPIRone (BUSPAR) 5 MG tablet, Take 5 mg by mouth 3 times daily , Disp: , Rfl:     dutasteride (AVODART) 0.5 MG capsule, Take 0.5 mg by mouth daily, Disp: , Rfl:     Handicap Placard [facility], by Does not apply route, Disp: , Rfl:     homatropine 5 % ophthalmic solution, 1 drop every hour , Disp: , Rfl:     pregabalin (LYRICA) 75 MG capsule, Take 75 mg by mouth 2 times daily. ., Disp: , Rfl:     metFORMIN (GLUCOPHAGE-XR) 500 MG extended release tablet, Take 500 mg by mouth 2 times daily , Disp: , Rfl:     metoprolol (LOPRESSOR) 100 MG tablet, Take 100 mg by mouth 2 times daily, Disp: , Rfl:     modafinil (PROVIGIL) 100 MG tablet, Take 100 mg by mouth daily. ., Disp: , Rfl:     omeprazole (PRILOSEC) 20 MG delayed release capsule, Take 20 mg by mouth every other day , Disp: , Rfl:     pioglitazone (ACTOS) 30 MG tablet, Take 30 mg by mouth daily, Disp: , Rfl:     Respiratory Therapy Supplies (NEBULIZER/ADULT MASK) KIT, by Does not apply route, Disp: , Rfl:     Testosterone (TESTOPEL) 75 MG PLLT, by Implant route. ., Disp: , Rfl:     insulin glargine (TOUJEO SOLOSTAR) 300 UNIT/ML injection pen, Inject into the skin nightly, Disp: , Rfl:     blood glucose test strips (TRUE METRIX BLOOD GLUCOSE TEST) strip, 1 each by In Vitro route daily As needed. , Disp: , Rfl:     albuterol sulfate  (90 Base) MCG/ACT inhaler, Inhale 2 puffs into the lungs every 6 hours as needed for Wheezing, Disp: , Rfl:     Liraglutide (VICTOZA) 18 MG/3ML SOPN SC injection, Inject 1.2 mg into the skin daily, Disp: , Rfl:     SOCIAL HISTORY     Social History     Social History Narrative    Not on file     Social History     Tobacco Use    Smoking status: Former Smoker     Packs/day: 1.00     Years: 15.00     Pack years: 15.00     Types: Cigarettes     Start date: 1/1/1977     Last attempt to quit: 1/1/1992 Years since quittin.7    Smokeless tobacco: Never Used   Substance Use Topics    Alcohol use: Yes     Comment: occ    Drug use: Never       ALLERGIES   No Known Allergies    FAMILY HISTORY     Family History   Problem Relation Age of Onset    Colon Cancer Neg Hx     Colon Polyps Neg Hx     Liver Cancer Neg Hx     Esophageal Cancer Neg Hx     Rectal Cancer Neg Hx     Stomach Cancer Neg Hx        PREVIOUS RECORDS   Previous records reviewed: Chart     PHYSICAL EXAM     ED Triage Vitals   BP Temp Temp src Pulse Resp SpO2 Height Weight   10/03/20 1814 -- -- 10/03/20 1758 10/03/20 1814 10/03/20 181 -- 10/03/20 1847   (!) 191/113   132 30 (!) 54 %  230 lb (104.3 kg)     Initial vital signs and nursing assessment reviewed and Patient was hypertensive, tachycardic, hypoxic. Pulsoximetry is abnormal per my interpretation. Additional Vital Signs:  Vitals:    10/03/20 2056   BP: 108/74   Pulse: 115   Resp: 24   Temp:    SpO2: 96%       Physical Exam  Vitals signs and nursing note reviewed. Constitutional:       General: He is in acute distress. Appearance: He is well-developed. He is diaphoretic. HENT:      Head: Normocephalic and atraumatic. Right Ear: External ear normal.      Left Ear: External ear normal.      Nose: Nose normal.   Eyes:      General:         Right eye: No discharge. Left eye: No discharge. Conjunctiva/sclera: Conjunctivae normal.   Cardiovascular:      Rate and Rhythm: Normal rate and regular rhythm. Heart sounds: Normal heart sounds. No murmur. Pulmonary:      Effort: Tachypnea, accessory muscle usage and respiratory distress present. Breath sounds: Decreased breath sounds and wheezing present. Abdominal:      Palpations: Abdomen is soft. Tenderness: There is no abdominal tenderness. There is no guarding. Skin:     General: Skin is warm. Findings: No erythema or rash.    Neurological:      Mental Status: He is alert and oriented to effusion. **This report has been created using voice recognition software. It may contain minor errors which are inherent in voice recognition technology. **      Final report electronically signed by Dr. Jae Solares on 10/3/2020 7:27 PM      XR CHEST PORTABLE    (Results Pending)       ED Medications administered this visit:   Medications   0.9 % sodium chloride infusion (1,000 mLs Intravenous New Bag 10/3/20 1800)   methylPREDNISolone sodium (SOLU-MEDROL) injection 125 mg (125 mg Intravenous Given 10/3/20 1814)   propofol injection (35 mcg/kg/min × 104.3 kg Intravenous Rate/Dose Change 10/3/20 2054)   piperacillin-tazobactam (ZOSYN) 3.375 g in dextrose 5 % 50 mL IVPB (mini-bag) (has no administration in time range)   vancomycin (VANCOCIN) 1,500 mg in dextrose 5 % 500 mL IVPB (1,500 mg Intravenous New Bag 10/3/20 2045)   ipratropium-albuterol (DUONEB) nebulizer solution 1 ampule ( Inhalation Canceled Entry 10/3/20 2013)   metoprolol (LOPRESSOR) injection 5 mg (5 mg Intravenous Given 10/3/20 1822)   etomidate (AMIDATE) injection 20 mg (20 mg Intravenous Given 10/3/20 1830)   rocuronium (ZEMURON) injection 120 mg (100 mg Intravenous Given 10/3/20 1833)   albuterol (PROVENTIL) nebulizer solution 2.5 mg (2.5 mg Nebulization Given 10/3/20 1920)   albuterol (PROVENTIL) nebulizer solution 2.5 mg (2.5 mg Nebulization Given 10/3/20 1855)   albuterol (PROVENTIL) nebulizer solution 2.5 mg (2.5 mg Nebulization Given 10/3/20 1908)   iopamidol (ISOVUE-370) 76 % injection 80 mL (80 mLs Intravenous Given 10/3/20 1950)       ED COURSE   Patient was seen in the emergency room for shortness of breath. Patient noted several weeks of worsening symptoms which acutely worsened in the past several hours. When patient presented he was hypoxic to 76% on room air, was in respiratory distress with tachypnea and increased accessory muscle use. Patient was immediately placed on a nasal cannula then a nonrebreather.   Patient was given a DuoNeb treatment. Patient's respiratory status continued to decline. Patient was satting low 80s on a nonrebreather. Patient was placed on BiPAP and was maintaining saturations in the low 90%. Patient was given 125 Solu-Medrol and a second DuoNeb. Patient continued to be tachypneic with accessory muscle use. The decision was made to intubate the patient. Risks and benefits were discussed and patient was agreeable to intubation. Intubation was performed as below by Dr. Loida Malhotra. A central line was subsequently placed by Dr. Forrest Cavazos and a NG tube was placed via nursing staff. Patient's labs reviewed ABG showed hypercapnia. Lactic was normal.  COVID was negative. UA indicated urinary tract infection. Patient CBC showed a leukocytosis of 12.7. Patient's BMP and BNP were unremarkable. Patient's d-dimer was elevated to 940 with CTA PE study was ordered. Patient's pro-Enrike was 0.15. Patient's troponin was negative. Patient's chest x-ray and CTA showed adequate endotracheal tube, NG tube central line tube, no PE, infiltrates concerning for pneumonia. Patient was discussed with ICU team who graciously admitted patient for further evaluation and treatment. Intubation    Date/Time: 10/3/2020 7:25 PM  Performed by: Nyasia Smyth MD  Authorized by: Rufus Barrett MD     Consent:     Consent obtained:  Verbal    Consent given by:  Patient    Risks discussed:  Pneumothorax and aspiration    Alternatives discussed:  No treatment  Pre-procedure details:     Patient status:  Awake    Mallampati score:  III    Pretreatment meds: Etomidate. Paralytics:  Succinylcholine  Procedure details:     Preoxygenation:  Bag valve mask    CPR in progress: no      Intubation method:  Oral    Oral intubation technique:  Video-assisted    Laryngoscope blade:   Mac 4    Tube size (mm):  7.5    Tube type:  Cuffed    Number of attempts:  1    Ventilation between attempts: no      Cricoid pressure: no      Tube visualized through cords: yes    Placement assessment:     ETT to lip:  24cm     Tube secured with:  ETT soares    Breath sounds:  Equal    Placement verification: chest rise      CXR findings:  ETT in proper place  Post-procedure details:     Patient tolerance of procedure: Tolerated well, no immediate complications        MEDICATION CHANGES     New Prescriptions    No medications on file       FINAL DISPOSITION     Final diagnoses:   Acute respiratory failure with hypoxia and hypercapnia (HCC)   Urinary tract infection in male   SIRS (systemic inflammatory response syndrome) (HCC)     Condition: condition: stable  Dispo: Admit to CCU/ICU    This transcription was electronically signed. Parts of this transcriptions may have been dictated by use of voice recognition software and electronically transcribed, and parts may have been transcribed with the assistance of an ED scribe. The transcription may contain errors not detected in proofreading. Please refer to my supervising physician's documentation if my documentation differs.     Electronically Signed: Kevin Ochoa, 10/03/20, 8:58 PM     Kevin Ochoa MD  Resident  10/03/20 6912

## 2020-10-03 NOTE — ED TRIAGE NOTES
Pt to ED via private vehicle w/rprts of increased SOB for the past several weeks. Rprts symptoms worsening today. Has been treating w/inhalers w/o improvement. Winded. Labored breathing upon arrival to ED. 76% on RA. Placed on O2 via NC up to 6L w/o improvement. Switched over to non-rebreather w/improvment; 95%.

## 2020-10-03 NOTE — ED NOTES
This RN received report from [facility]. Pt laying on cot and is on ventilator. Pt respirations easy and unlabored. Pt came in for COPD exacerbation. Respiratory at bedside. Pt is cool and clammy to touch. Pt was around 77% on room air when coming through triage.      [id number] Elder Perez RN  10/03/20 [id number] [employer] [place], RN  10/03/20 7577

## 2020-10-03 NOTE — ED NOTES
No improvement following duoneb. Pt continues to labored. Diaphoretic. Worsening of work a breathing noted. Pt transferred to room 4. Dr. Randene Schlatter updated and at bedside. Verbal order for solumedrol. Pt's spouse rprts no hear hx, diabetic, copd and prior hernia repair. Pt's spouse; Karrie Mireles placed in family and updated on POC.       Katja Smith RN  10/03/20 0898

## 2020-10-03 NOTE — FLOWSHEET NOTE
Patient experiencing difficulty breathing. Encountered his wife, Donita Bean, in the family waiting area. Provided hospitality. Donita Bean was in contact with her daughter who lives in [place]. She has no one else to call locally for support. She did not want to contact Joe's family as they are currently , but not legally. \"We are still . We just don' live together any more. We talk every day. I still love him. \"  Shared prayer with Donita Bean for peace and healing and support for staff helping Luba Peterson. She states there is tension between herself and Joe's family and she carl rather he make the decision to contact his family. Dr. Arabella Duarte updated Donita Bean. Patient is intubated and they are now trying to determine what caused the breathing problems. Donita Bean was able to go to bedside , but could not stay as she found it too upsetting. She did ask if she could stay with him here. Explained that she would not be able to stay bedside in ICU overnight, bt carl be in the waiting room. Since she lives close by and has transportation, she is better off going home to her own bed. Verified her phone number in case of emergency. 10/03/20 1830   Encounter Summary   Services provided to: Significant other   Referral/Consult From: Nursing Supervisor/Manager   Support System Spouse; Family members   Continue Visiting Yes  (10/3)   Complexity of Encounter High   Length of Encounter 1 hour;15 minutes   Crisis   Type Emotional distress;ED Alert   Assessment Approachable;Tearful; Anxious; Helplessness   Intervention Active listening;Explored feelings, thoughts, concerns;Explored coping resources;Nurtured hope;Prayer;Sustaining presence/ Ministry of presence; Discussed illness/injury and it's impact; Discussed belief system/Restorationism practices/meenakshi   Outcome Connection/belonging;Comfort;Expressed gratitude;Engaged in conversation;Receptive; Less anxious, less agitated

## 2020-10-03 NOTE — ED NOTES
ED nurse-to-nurse bedside report    Chief Complaint   Patient presents with    Shortness of Breath      LOC: pt intubated  Vital signs   Vitals:    10/03/20 1849 10/03/20 1859 10/03/20 1908 10/03/20 1921   BP: (!) 181/121  (!) 207/136 (!) 202/126   Pulse: 136 143 141 140   Resp: 27  21 20   SpO2: 91% 92% 91% 95%   Weight:          Pain:    Pain Interventions: none at this time. Pain Goal: pt unable to quantify   Oxygen: intubation and ventilation     Current needs required :Admit to ICU    Telemetry: Yes  LDAs:   Peripheral IV 10/03/20 Left Forearm (Active)   Site Assessment Clean;Dry; Intact 10/03/20 1812   Line Status Blood return noted;Normal saline locked 10/03/20 1812   Dressing Status Intact;Dry;Clean 10/03/20 1812   Dressing Intervention New 10/03/20 1812       Peripheral IV 10/03/20 Left Forearm (Active)     Continuous Infusions:    sodium chloride 1,000 mL (10/03/20 1800)     Mobility: Fully dependent  Ge Fall Risk Score: No flowsheet data found.   Fall Interventions: side rails upX2   Report given to: Adair Barber RN  10/03/20 1491

## 2020-10-03 NOTE — ED NOTES
Nola Eden, RN to administer albuterol treatment. Pt laboring. RR 32 on non-rebreather.       Levander Gowers, RN  10/03/20 9265

## 2020-10-03 NOTE — ED NOTES
Propofol increased due to pt becoming restless. Pt respirations easy and unlabored.       Marylene Setting, RN  10/03/20 9475

## 2020-10-03 NOTE — ED PROVIDER NOTES
Pt Name: Eb Woodall  MRN: [id number]  YOB: 1955  Date of evaluation: 10/3/2020    I personally saw and examined the patient. I have reviewed and agreed with the resident physician's findings including all diagnostic interpretations and treatment plans as written. Please see resident physician's chart for details. I was present for the key portions of any procedures performed and the inclusive time noted in any critical care statement. Briefly this is a 76 y.o. male present to ED c/o Shortness of Breath    Shortness of breath of several weeks worse today. History of COPD. He was at apparent respiratory distress with impending respite failure/arrest, he was struggling for air even on CPAP, he was sweating and exhausted. He was intubated by resident physician with my direct supervision, see his procedure note. We can see moderate to large amount of mucus and sputum during intubation and aspiration is possible    A right subclavian CVC was inserted by me. Zosyn and vancomycin IV were started in ED. Discussed and admitted to ICU. Central Line Placement Procedure Note    Indication: vascular access and need for frequent blood draws    Consent: The patient provided verbal consent for this procedure. Procedure: The patient was positioned appropriately and the skin over the right subclavian vein was prepped with Chloraprep. Local anesthesia was obtained by infiltration using 1% Lidocaine without epinephrine. A large bore needle was used to identify the vein. A guide wire was then inserted into the vein through the needle. A triple lumen catheter was then inserted into the vessel over the guide wire using the Seldinger technique. All ports showed good, free flowing blood return and were flushed with saline solution. The catheter was then securely fastened to the skin with sutures and covered with a sterile dressing.   A post procedure X-ray was ordered and showed no evidence of pneumothorax. The patient tolerated the procedure well.     Complications: None      VITALS  Vitals:    10/04/20 0200 10/04/20 0215 10/04/20 0230 10/04/20 0300   BP: 105/66 103/68 100/70 107/69   Pulse: 92 92 92 90   Resp: 18 17 16 18   Temp:       TempSrc:       SpO2: 98% 98% 99% 99%   Weight:       Height:           LABS  Results for orders placed or performed during the hospital encounter of 10/03/20   CBC Auto Differential   Result Value Ref Range    WBC 12.7 (H) 4.8 - 10.8 thou/mm3    RBC 6.57 (H) 4.70 - 6.10 mill/mm3    Hemoglobin 17.5 14.0 - 18.0 gm/dl    Hematocrit 56.4 (H) 42.0 - 52.0 %    MCV 85.8 80.0 - 94.0 fL    MCH 26.6 26.0 - 33.0 pg    MCHC 31.0 (L) 32.2 - 35.5 gm/dl    RDW-CV 18.7 (H) 11.5 - 14.5 %    RDW-SD 52.1 (H) 35.0 - 45.0 fL    Platelets 693 595 - 415 thou/mm3    MPV ---- 9.4 - 12.4 fL    Seg Neutrophils 75.5 %    Lymphocytes 13.8 %    Monocytes 8.3 %    Eosinophils 0.8 %    Basophils 0.7 %    Immature Granulocytes 0.9 %    Segs Absolute 9.6 (H) 1.8 - 7.7 thou/mm3    Lymphocytes Absolute 1.8 1.0 - 4.8 thou/mm3    Monocytes Absolute 1.1 0.4 - 1.3 thou/mm3    Eosinophils Absolute 0.1 0.0 - 0.4 thou/mm3    Basophils Absolute 0.1 0.0 - 0.1 thou/mm3    Immature Grans (Abs) 0.11 (H) 0.00 - 0.07 thou/mm3    nRBC 0 /100 wbc   Basic Metabolic Panel w/ Reflex to MG   Result Value Ref Range    Sodium 139 135 - 145 meq/L    Potassium reflex Magnesium 4.0 3.5 - 5.2 meq/L    Chloride 100 98 - 111 meq/L    CO2 28 23 - 33 meq/L    Glucose 123 (H) 70 - 108 mg/dL    BUN 9 7 - 22 mg/dL    CREATININE 1.0 0.4 - 1.2 mg/dL    Calcium 10.1 8.5 - 10.5 mg/dL   Brain Natriuretic Peptide   Result Value Ref Range    Pro-.5 0.0 - 900.0 pg/mL   D-Dimer, Quantitative   Result Value Ref Range    D-Dimer, Quant 940.00 (H) 0.00 - 500.00 ng/ml FEU   Blood Gas, Arterial   Result Value Ref Range    pH, Blood Gas 7.15 (LL) 7.35 - 7.45    PCO2 86 (HH) 35 - 45 mmhg    PO2 94 71 - 104 mmhg    HCO3 30 (H) 23 - 28 mmol/l Base Excess (Calculated) -3.3 (L) -2.5 - 2.5 mmol/l    O2 Sat 94 %    IFIO2 100     DEVICE BiPAP     SET PEEP 10.0 mmhg    SET PRESS SUPP 16 cmH2O    Augusto Test Positive     Source: R Radial     COLLECTED BY: [id number]    Lactic Acid, Plasma   Result Value Ref Range    Lactic Acid 1.5 0.5 - 2.2 mmol/L   Procalcitonin   Result Value Ref Range    Procalcitonin 0.15 (H) 0.01 - 0.09 ng/mL   Troponin   Result Value Ref Range    Troponin T < 0.010 ng/ml   Anion Gap   Result Value Ref Range    Anion Gap 11.0 8.0 - 16.0 meq/L   Osmolality   Result Value Ref Range    Osmolality Calc 277.6 275.0 - 300.0 mOsmol/kg   Glomerular Filtration Rate, Estimated   Result Value Ref Range    Est, Glom Filt Rate >90 ml/min/1.73m2   Scan of Blood Smear   Result Value Ref Range    SCAN OF BLOOD SMEAR see below    COVID-19   Result Value Ref Range    SARS-CoV-2, NAAT NOT DETECTED NOT DETECTED   Urine with Reflexed Micro   Result Value Ref Range    Glucose, Ur NEGATIVE NEGATIVE mg/dl    Bilirubin Urine NEGATIVE NEGATIVE    Ketones, Urine NEGATIVE NEGATIVE    Specific Gravity, Urine 1.013 1.002 - 1.030    Blood, Urine MODERATE (A) NEGATIVE    pH, UA 6.5 5.0 - 9.0    Protein,  (A) NEGATIVE    Urobilinogen, Urine 0.2 0.0 - 1.0 eu/dl    Nitrite, Urine POSITIVE (A) NEGATIVE    Leukocyte Esterase, Urine SMALL (A) NEGATIVE    Color, UA YELLOW STRAW-YELLOW    Character, Urine CLOUDY (A) CLEAR-SL CLOUD    RBC, UA 25-50 0-2/hpf /hpf    WBC, UA  0-4/hpf /hpf    Epithelial Cells, UA 0-2 3-5/hpf /hpf    Bacteria, UA MANY FEW/NONE SEEN /hpf    Casts UA 8-15 HYALINE NONE SEEN /lpf    Crystals, UA NONE SEEN NONE SEEN    Renal Epithelial, UA NONE SEEN NONE SEEN    Yeast, UA NONE SEEN NONE SEEN    CASTS 2 NONE SEEN NONE SEEN /lpf    MISCELLANEOUS 2 NONE SEEN    Blood Gas, Arterial   Result Value Ref Range    pH, Blood Gas 7.25 (L) 7.35 - 7.45    PCO2 70 (HH) 35 - 45 mmhg    PO2 72 71 - 104 mmhg    HCO3 31 (H) 23 - 28 mmol/l    Base Excess (Calculated) 0. 1 -2.5 - 2.5 mmol/l    O2 Sat 91 %    IFIO2 100     DEVICE Adult Vent     Mode AC     SET RESPIRATORY RATE 18 bpm    SET TIDAL VOLUME 550 ml    SET PEEP 14.0 mmhg    Augusto Test Positive     Source: R Radial     COLLECTED BY: [id number]    Hepatic Function Panel   Result Value Ref Range    Alb 3.6 3.5 - 5.1 g/dL    Total Bilirubin 0.9 0.3 - 1.2 mg/dL    Bilirubin, Direct 0.3 0.0 - 0.3 mg/dL    Alkaline Phosphatase 129 (H) 38 - 126 U/L    AST 28 5 - 40 U/L    ALT 29 11 - 66 U/L    Total Protein 6.9 6.1 - 8.0 g/dL   COVID-19   Result Value Ref Range    SARS-CoV-2, NAAT NOT DETECTED NOT DETECTED   SPECIMEN REJECTION   Result Value Ref Range    Rejected Test PNIP     Reason for Rejection see below    SPECIMEN REJECTION   Result Value Ref Range    Rejected Test culture     Reason for Rejection see below    POCT Glucose   Result Value Ref Range    POC Glucose 170 (H) 70 - 108 mg/dl   POCT glucose   Result Value Ref Range    POC Glucose 257 (H) 70 - 108 mg/dl   POCT glucose   Result Value Ref Range    POC Glucose 264 (H) 70 - 108 mg/dl   EKG 12 Lead   Result Value Ref Range    Ventricular Rate 121 BPM    Atrial Rate 121 BPM    P-R Interval 154 ms    QRS Duration 88 ms    Q-T Interval 308 ms    QTc Calculation (Bazett) 437 ms    P Axis 69 degrees    R Axis -46 degrees    T Axis 64 degrees       IMAGING STUDIES  CTA CHEST W WO CONTRAST   Final Result   1. The distal tip of the endotracheal tube is 3.3 cm above the level of the ila. 2. The esophageal tube extends to the stomach. 3. There is a right jugular central venous catheter with the distal tip within the superior vena cava. 4. There are small bilateral pleural effusions, right greater than left. There is infiltrate throughout both lung fields with additional consolidation within the mid and lower chest. Clinical management is recommended. Follow-up chest radiographs are    also recommended to confirm complete resolution.       5. There are mediastinal and hilar lymph nodes which may be reactive related to the inflammatory process. A follow-up CT examination of the chest with intravenous contrast in 3 months is recommended to confirm stability/resolution. 6. There is no pulmonary embolus. **This report has been created using voice recognition software. It may contain minor errors which are inherent in voice recognition technology. **      Final report electronically signed by Dr. Brendan Morales on 10/3/2020 8:14 PM      XR CHEST PORTABLE   Final Result   1. The distal tip of the endotracheal tube is 5.2 cm above the level of the ila. 2. The esophageal tube extends into the stomach and off the inferior aspect of the image. 3. There is a right subclavian central venous catheter with the distal tip overlying the superior vena cava. There is no pneumothorax. 4. There are infiltrates throughout both lung fields with a predominantly perihilar distribution. There is no pleural effusion. **This report has been created using voice recognition software. It may contain minor errors which are inherent in voice recognition technology. **      Final report electronically signed by Dr. Brendan Morales on 10/3/2020 7:27 PM      XR CHEST PORTABLE    (Results Pending)       ED MEDICATIONS  Medications   ipratropium-albuterol (DUONEB) 0.5-2.5 (3) MG/3ML nebulizer solution (has no administration in time range)   0.9 % sodium chloride infusion (1,000 mLs Intravenous New Bag 10/3/20 1800)   methylPREDNISolone sodium (SOLU-MEDROL) injection 125 mg (125 mg Intravenous Given 10/3/20 1814)   ipratropium-albuterol (DUONEB) nebulizer solution 1 ampule (has no administration in time range)   albuterol (PROVENTIL) nebulizer solution 2.5 mg (has no administration in time range)   albuterol (PROVENTIL) nebulizer solution 2.5 mg (has no administration in time range)   ipratropium-albuterol (DUONEB) nebulizer solution 1 ampule (1 ampule Inhalation Given 10/3/20 1917) metoprolol (LOPRESSOR) injection 5 mg (5 mg Intravenous Given 10/3/20 1822)   etomidate (AMIDATE) injection 20 mg (20 mg Intravenous Given 10/3/20 1830)   rocuronium (ZEMURON) injection 120 mg (100 mg Intravenous Given 10/3/20 1833)   propofol injection (20 mcg/kg/min Intravenous Given 10/3/20 1849)   albuterol (PROVENTIL) nebulizer solution 2.5 mg (2.5 mg Nebulization Given 10/3/20 1855)       DIAGNOSIS  1. Acute respiratory failure with hypoxia and hypercapnia (HCC)    2. Urinary tract infection in male    3. SIRS (systemic inflammatory response syndrome) (HCC)        CRITICAL CARE: There was a high probability of clinically significant/life threatening deterioration in this patient's condition which required my urgent intervention. Total critical care time was 30 minutes. This excludes any time for separately reportable procedures. DISPOSITION and PLAN  Admit to ICU.      Brayan Hutson M.D.        Brayan Hutson MD  10/04/20 8858

## 2020-10-03 NOTE — ED NOTES
POCT at this time 259 [facility], RN  10/03/20 603 NDb [facility], RN  10/03/20 9880

## 2020-10-04 NOTE — H&P
CRITICAL CARE- History & Physical      Patient: Gabby Orn    Unit/Bed:04/004A  YOB: 1955  MRN: [id number]   Acct: [de-identified]   PCP: KRYSTAL Coleman - CNP    Date of Service: Pt seen/examined on 10/03/20  and Admitted to Inpatient with expected LOS greater than two midnights due to medical therapy. Chief Complaint: Shortness of breath    Assessment and Plan:-  1. Acute Hypercapnic/Hypoxic Respiratory Failure: Continue PCV mode of mechanical ventilation with goal peak pressure 35 or less and plateau pressure 30 or less. 2. Severe Pneumonia:  Molecular pneumonia panel and respiratory culture. Sputum per ET tube appeared as emesis compared to noted drainage from OG tube-concern for aspiration. Start IV Zosyn. Decadron IV x4 days. Repeat COVID-19 PCR despite negative in ER due to severity of CT scan. 3. Acute Cystitis:  Zosyn per IV. Trend urine culture. 4. Acute Exacerbation Systolic Heart Failure:  Echocardiogram on 2/24/2020 demonstrated EF 35 to 40% and trace mitral regurg. CT scan demonstrates pleural effusions and pulmonary edema although BNP normal and patient appears euvolemic/hypovolemic with dry mucous membranes/no edema. Limited echo. Strict intake and output and daily weights. 5. COPD/Asthma: (Not in exacerbation)  Most recent PFTs February 2020 demonstrated FEV1 69%. Continue Advair and Incruse. Albuterol PRN. 6. Polycythemia:  Noted previous history, IVF. Trend CBC. 7. Diabetes Mellitus Type #2: (Uncontrolled) Hemoglobin A1C 8.7%, average glucose 203 on 7/15/2020. Hold metformin for 48 hrs.  secondary to IV contrast administration. Hold Actos and Victoza. Siding scale insulin and Lantus. 8. Essential Hypertension: Continue beta-blocker. Hold Cozaar and HCTZ as pateint currently borderline hypotensive. 9. Urinary Retention: Unable to administer Flomax as it cannot be crushed per OG tube. Substitute Proscar for outpatient dutasteride.   10. JOSE A: Continue home CPAP as appropriate. 11. Hyperlipidemia: Continue statin. Patient did undergo cardiac catheterization in January 2020 which demonstrated no significant coronary artery disease. History Of Present Illness: (Obtained per chart review as patient is sedated on mechanical ventilation)    68-year-old obese, -American male, past medical history includes former smoker, diabetes mellitus type #2, testosterone deficiency, sleep apnea, hypertension, hyperlipidemia, COPD/asthma, and arthritis who originally presented on 10/3/2020 per private vehicle to [facility]'s emergency department reporting increasing shortness of breath over several weeks acutely worsened day of admission. On arrival to the emergency department patient's pulse ox was 76% on room air. Patient was placed on nonrebreather with improvement however respirations continued to be tachypneic and labored. Trial of BiPAP was initiated without improvement in respiratory status. Patient did require intubation and mechanical ventilation. In the emergency department patient received 3 albuterol and 1 DuoNeb  treatments, 125 mg of Solu-Medrol IV, and Lopressor 5 mg IV once. Patient blood pressure on arrival was noted to be 520 systolic and after intubation and Lopressor administration was noted to drop to 90 systolic. S/P one-time dose vancomycin IV. Patient subsequently admitted to the Intensive Care unit for further monitoring. Further details see assessment/plan.       Past Medical History:        Diagnosis Date    Acid reflux     Anxiety     Arthritis     in foot     Asthma     Change in bowel habits     Constipation     COPD (chronic obstructive pulmonary disease) (HCC)     COPD (chronic obstructive pulmonary disease) (HCC)     Depression     Diarrhea     Heartburn     Hyperlipidemia     Hypertension     Hypogonadism male     Loss of vision     Migraine     Neuralgia     Shortness of breath     Sleep apnea     Testosterone deficiency in male     Type 2 diabetes mellitus ([facility] [address]        Past Surgical History:        Procedure Laterality Date    CARDIAC CATHETERIZATION      EYE SURGERY Right 08/2019    HERNIA REPAIR         Home Medications:   No current facility-administered medications on file prior to encounter. Current Outpatient Medications on File Prior to Encounter   Medication Sig Dispense Refill    Umeclidinium Bromide (INCRUSE ELLIPTA) 62.5 MCG/INH AEPB inhalation daily 30 each 5    ammonium lactate (AMLACTIN) 12 % cream Apply topically as needed for Dry Skin Apply topically as needed.  atorvastatin (LIPITOR) 10 MG tablet Take 10 mg by mouth 3 times daily Take one tablet by mouth three times daily      insulin aspart (NOVOLOG FLEXPEN) 100 UNIT/ML injection pen Inject into the skin 3 times daily (before meals)      tamsulosin (FLOMAX) 0.4 MG capsule Take 0.4 mg by mouth daily      losartan-hydrochlorothiazide (HYZAAR) 50-12.5 MG per tablet Take 1 tablet by mouth daily      Multiple Vitamins-Minerals (MULTIVITAMIN ADULT PO) Take 1 tablet by mouth daily      fluticasone-salmeterol (ADVAIR DISKUS) 250-50 MCG/DOSE AEPB Inhale 1 puff into the lungs every 12 hours 60 each 3    zolpidem (AMBIEN) 10 MG tablet Take by mouth nightly as needed for Sleep. Viky Lewis aspirin 81 MG EC tablet Take 81 mg by mouth daily      baclofen (LIORESAL) 10 MG tablet Take 10 mg by mouth daily       busPIRone (BUSPAR) 5 MG tablet Take 5 mg by mouth 3 times daily       dutasteride (AVODART) 0.5 MG capsule Take 0.5 mg by mouth daily      Handicap Placard MISC by Does not apply route      homatropine 5 % ophthalmic solution 1 drop every hour       pregabalin (LYRICA) 75 MG capsule Take 75 mg by mouth 2 times daily. Viky Lewis metFORMIN (GLUCOPHAGE-XR) 500 MG extended release tablet Take 500 mg by mouth 2 times daily       metoprolol (LOPRESSOR) 100 MG tablet Take 100 mg by mouth 2 times daily      modafinil (PROVIGIL) 100 MG Stress ulcer:  [] PPI Agent  [x] H2RA [] Sucralfate [] Other:   VTE:     [x] Enoxaparin    [] Warfarin [] NOAC [] PCD Device:Bilat LE   [] Heparin: [] Subcut / [] IV       Electronically signed by KRYSTAL Kenney CNP on 10/3/2020 at 8:44 PM  CRITICAL CARE SPECIALIST.

## 2020-10-04 NOTE — ED NOTES
Pt was suctioned. Pt titrated down on propofol due to pt blood pressure.       Vivian Lawrence RN  10/03/20 6161

## 2020-10-04 NOTE — ED NOTES
Pt is cool but less clammy. Pt is restless. Pt respirations easy and unlabored. Propofol dose changed.  RASS score -2     Hyun Baugh RN  10/03/20 2011       Missael Zhang RN  10/03/20 2012

## 2020-10-04 NOTE — PLAN OF CARE
Problem: Restraint Use - Nonviolent/Non-Self-Destructive Behavior:  Goal: Absence of restraint indications  Description: Absence of restraint indications  10/4/2020 1302 by Dwaine Malhotra RN  Outcome: Not Met This Shift  Note: Pulls at ett when untied  10/4/2020 0203 by Raj Lopez RN  Outcome: Ongoing  Note: Pt attempting to grab at ETT tube     Problem: Restraint Use - Nonviolent/Non-Self-Destructive Behavior:  Goal: Absence of restraint-related injury  Description: Absence of restraint-related injury  10/4/2020 1302 by Dwaine Malhotra RN  Outcome: Ongoing  Note: No red or swollen areas at present   10/4/2020 0203 by Raj Lopez RN  Outcome: Ongoing  Note: Circulation checks every two hours      Problem: Urinary Elimination:  Goal: Signs and symptoms of infection will decrease  Description: Signs and symptoms of infection will decrease  10/4/2020 1302 by Dwaine Malhotra RN  Outcome: Ongoing  Note: Positive culture- continues on antibiotics   10/4/2020 0203 by Raj Lopez RN  Outcome: Ongoing  Note: [place] care is being done every shift and catheter bag is remaining below bladder.       Problem: Urinary Elimination:  Goal: Complications related to the disease process, condition or treatment will be avoided or minimized  Description: Complications related to the disease process, condition or treatment will be avoided or minimized  10/4/2020 1302 by Dwaine Malhotra RN  Outcome: Ongoing  Note: none  at present   10/4/2020 0203 by Raj Lopez RN  Outcome: Ongoing  Note: Temperature being monitored continuously to assess for signs of infection      Problem: Infection - Central Venous Catheter-Associated Bloodstream Infection:  Goal: Will show no infection signs and symptoms  Description: Will show no infection signs and symptoms  10/4/2020 1302 by Dwaine Malhotra RN  Outcome: Ongoing  Note: No evidence of at present   10/4/2020 0203 by Raj Lopez RN  Outcome: Ongoing  Note: Pt being turned Q2 hours and sacral patch applied to help reduce possibility of breakdown      Problem: Skin Integrity:  Goal: Will show no infection signs and symptoms  Description: Will show no infection signs and symptoms  10/4/2020 1302 by Christiano Burton RN  Outcome: Ongoing  Note: No evidence of at present   10/4/2020 0203 by Bala Knight RN  Outcome: Ongoing  Note: Pt being turned Q2 hours and sacral patch applied to help reduce possibility of breakdown      Problem: Skin Integrity:  Goal: Absence of new skin breakdown  Description: Absence of new skin breakdown  10/4/2020 1302 by Christiano Burton RN  Outcome: Ongoing  Note: No evidence of at this time   10/4/2020 0203 by Bala Knight RN  Outcome: Ongoing  Note: No current signs of breakdown on assessment      Problem: Falls - Risk of:  Goal: Will remain free from falls  Description: Will remain free from falls  Outcome: Ongoing  Note: None at present      Problem: Falls - Risk of:  Goal: Absence of physical injury  Description: Absence of physical injury  Outcome: Ongoing  Note: None at present      Problem: Impaired respiratory status  Goal: Will be able to breathe spontaneously, without ventilator support  Description: Will be able to breathe spontaneously, without ventilator support  10/4/2020 1302 by Christiano Burton RN  Outcome: Ongoing  Note: Continues on vent   10/4/2020 0516 by Manasa Murphy RCP  Outcome: Ongoing   Care plan reviewed with patient and family . Patient  expresses and family verbalize understanding of the plan of care and contribute to goal setting.

## 2020-10-04 NOTE — PLAN OF CARE
Vent setting optimized to achieve target tidal volume, respiratory rate and ideal oxygen saturations. SBT will be performed when appropriate.     Problem: Impaired respiratory status  Goal: Will be able to breathe spontaneously, without ventilator support  Description: Will be able to breathe spontaneously, without ventilator support  10/4/2020 0516 by John Mcqueen RCP  Outcome: Ongoing

## 2020-10-04 NOTE — PLAN OF CARE
Problem: Impaired respiratory status  Goal: Will be able to breathe spontaneously, without ventilator support  Description: Will be able to breathe spontaneously, without ventilator support  10/4/2020 1745 by Teo Friedman RCP  Outcome: Ongoing     Pt still requiring ventilatory support=will continue with current plan of care and wean as able.

## 2020-10-04 NOTE — ED NOTES
Pt appears to be more relaxed at this time and less restless. Pt RASS score at -1.      Karol Tellez RN  10/03/20 2018

## 2020-10-04 NOTE — PROGRESS NOTES
10/3/2020 2120    Patient arrived to unit from ED via bed. Patient transferred to ICU bed and placed on continuous ICU bedside monitor. Patient admitted for Respiratory failure ([facility] [address] [J96.90]. Vitals obtained. See flowsheets. Patient's IV access includes right subclavian central line, 18 g left forearm. Current infusions and rates of infusion include propofol at 35 mcg/kg/min, vamcomycin at 250 ml/hr, and normal saline at 125 ml/hr. Assessment completed by Amy Nino. Two nurse skin assessment completed by Amy Nino and Nubia RANDALL. See flowsheets for assessment details. Policies and procedures of ICU able to be explained to patient at this time. Family member(s)/representative(s) present at time of admission include Mindy Chacon, wife. Patient rights explained to family member(s)/representatives and patient, as able. Patient/patient's family member(s)/representative(s) N/A to have physician notified of their admission. All questions posed by patient's family member(s)/representative(s) and patient answered at this time.

## 2020-10-04 NOTE — ED NOTES
Pt was becoming more restless. Pt suctioned due to increased secretions. Pt appears to be more comfortable at this time. Pt respirations easy and unlabored.       Les Ibrahim RN  10/03/20 2045

## 2020-10-04 NOTE — PLAN OF CARE
Problem: Impaired respiratory status  Goal: Will be able to breathe spontaneously, without ventilator support  Description: Will be able to breathe spontaneously, without ventilator support  Outcome: Ongoing   Pt intubated in ER and placed on the vent. Will attempt weaning when pt is ready.

## 2020-10-04 NOTE — FLOWSHEET NOTE
folllowed up on note from another . Pt's wife was not present. Pt is intubated and unable to speak.  prayed with the pt. Pt gave a thumbs up. Spiritual care to continue to provide support for pt and his family.      10/04/20 1430   Encounter Summary   Services provided to: Patient   Referral/Consult From: Other    Support System Spouse   Continue Visiting Yes  (10/4)   Complexity of Encounter Low   Length of Encounter 15 minutes   Routine   Type Follow up   Assessment Passive   Intervention Prayer   Outcome Comfort

## 2020-10-04 NOTE — PROGRESS NOTES
Intensive Care Unit  Intensivist Progress Note    Patient: Wanda Tate  : 1955  MRN#: [id number]  10/4/2020 8:29 AM  ADMISSION DAY:  10/3/2020  5:45 PM     Subjective:  Patient is intubated, on mechanical ventilation. He is awake on propofol and following commands. He is requiring high FiO2, he was weaned down to 65%. He is afebrile this morning however he had fever on presentation 100.6. His wife stated that he was not feeling well in the last 2 weeks and yesterday he got worse that he had to come to the emergency department where he was found to be hypoxic and hypercapnic. CTA of the chest shows bilateral infiltrate with combination of consolidation and extensive groundglass appearance. COVID-19 test was negative twice. His creatinine is up to 1.6 with a potassium of 5.7. Procalcitonin is up to 4.11  HPI:  58-year-old obese, -American male, past medical history includes former smoker, diabetes mellitus type #2, testosterone deficiency, sleep apnea, hypertension, hyperlipidemia, COPD/asthma, and arthritis who originally presented on 10/3/2020 per private vehicle to [facility] emergency department reporting increasing shortness of breath over several weeks acutely worsened day of admission. On arrival to the emergency department patient's pulse ox was 76% on room air. Patient was placed on nonrebreather with improvement however respirations continued to be tachypneic and labored. Trial of BiPAP was initiated without improvement in respiratory status. Patient did require intubation and mechanical ventilation. In the emergency department patient received 3 albuterol and 1 DuoNeb  treatments, 125 mg of Solu-Medrol IV, and Lopressor 5 mg IV once. Patient blood pressure on arrival was noted to be 091 systolic and after intubation and Lopressor administration was noted to drop to 90 systolic. S/P one-time dose vancomycin IV.   Patient subsequently admitted to the Intensive Care unit for further monitoring. Patient Vitals for the past 8 hrs:   BP Temp Temp src Pulse Resp SpO2 Weight   10/04/20 0700 135/73 -- -- 100 19 98 % --   10/04/20 0630 136/76 -- -- 99 20 98 % --   10/04/20 0627 -- -- -- -- -- 100 % --   10/04/20 0600 (!) 128/100 -- -- 104 21 -- 233 lb 11 oz (106 kg)   10/04/20 0530 111/66 -- -- 95 18 97 % --   10/04/20 0513 -- -- -- 98 20 99 % --   10/04/20 0510 -- -- -- 91 -- -- --   10/04/20 0500 112/71 -- -- 92 18 99 % --   10/04/20 0430 113/66 -- -- 91 18 99 % --   10/04/20 0400 115/66 98.4 °F (36.9 °C) Bladder 88 18 100 % --   10/04/20 0330 109/71 -- -- 90 18 99 % --   10/04/20 0300 107/69 -- -- 90 18 99 % --   10/04/20 0230 100/70 -- -- 92 16 99 % --   10/04/20 0215 103/68 -- -- 92 17 98 % --   10/04/20 0200 105/66 -- -- 92 18 98 % --   10/04/20 0145 128/80 -- -- 97 22 100 % --   10/04/20 0130 96/70 -- -- 92 19 98 % --   10/04/20 0115 111/66 -- -- 92 19 99 % --   10/04/20 0102 -- -- -- 93 17 100 % --   10/04/20 0100 96/63 -- -- 90 16 99 % --   10/04/20 0045 90/67 -- -- 91 15 99 % --   10/04/20 0030 96/67 -- -- 94 17 99 % --       EXAM:  General: No distress. Intubated, awake and following commands on sedation  Eyes: PERRL. No sclera icterus. No conjunctival injection. ENT: No discharge. Pharynx clear. Neck: Trachea midline. Normal thyroid. Resp: On mechanical ventilation, no accessory muscle use. Scattered wheezing and rhonchi. No dullness on percussion. CV: Tachycardic. Regular rhythm. No mumur or rub. No edema. Abd: Non-tender. Non-distended. No masses. No organmegaly. Normal bowel sounds. No hernia. Skin: Warm and dry. No nodule on exposed extremities. No rash on exposed extremities. Lymph: No cervical LAD. No supraclavicular LAD. M/S: No cyanosis. No joint deformity. No clubbing. Neuro: Awake. Follows commands. Positive pupils/gag/corneals. Normal pain response.         Intake/Output Summary (Last 24 hours) at 10/4/2020 0829  Last data filed at 10/4/2020 4926  Gross per 24 hour   Intake 2155 ml   Output 525 ml   Net 1630 ml     I/O last 3 completed shifts: In: 2155 [I.V.:2155]  Out: 525 [Urine:525]   Date 10/04/20 0000 - 10/04/20 2359   Shift 0000-0759 2053-8594 6585-7012 24 Hour Total   INTAKE   I.V.(mL/kg) 5001(10.3)   1047(53.0)   Shift Total(mL/kg) 0317(25.9)   7063(65.6)   OUTPUT   Urine(mL/kg/hr) 525(0.6)   525   Shift Total(mL/kg) 525(5)   525(5)   Weight (kg) 106 106 106 106     Wt Readings from Last 3 Encounters:   10/04/20 233 lb 11 oz (106 kg)   07/01/20 229 lb (103.9 kg)   06/29/20 226 lb (102.5 kg)      Body mass index is 36.6 kg/m².          Scheduled Meds:   chlorhexidine  15 mL Mouth/Throat BID    famotidine (PEPCID) injection  20 mg Intravenous BID    aspirin  81 mg Oral Daily    atorvastatin  10 mg Oral TID    baclofen  10 mg Oral Daily    therapeutic multivitamin-minerals  1 tablet Oral Daily    pregabalin  75 mg Oral BID    tiotropium  2 puff Inhalation Daily    sodium chloride flush  10 mL Intravenous 2 times per day    enoxaparin  40 mg Subcutaneous Daily    insulin lispro  0-6 Units Subcutaneous Q4H    dexamethasone  4 mg Intravenous Daily    [Held by provider] losartan  50 mg Oral Daily    And    [Held by provider] hydroCHLOROthiazide  12.5 mg Oral Daily    metoprolol  50 mg Oral BID    busPIRone  5 mg Oral TID    finasteride  5 mg Oral Daily    modafinil  100 mg Oral Daily    budesonide-formoterol  2 puff Inhalation BID    insulin glargine  20 Units Subcutaneous Nightly     Continuous Infusions:   dexmedetomidine      propofol 45 mcg/kg/min (10/04/20 0618)    sodium chloride 125 mL/hr at 10/04/20 0146    dextrose       PRN Meds:fentanNYL, 25 mcg, Q1H PRN  albuterol sulfate HFA, 2 puff, Q6H PRN  sodium chloride flush, 10 mL, PRN  acetaminophen, 650 mg, Q6H PRN    Or  acetaminophen, 650 mg, Q6H PRN  polyethylene glycol, 17 g, Daily PRN  promethazine, 12.5 mg, Q6H PRN    Or  ondansetron, 4 mg, Q6H PRN  glucose, 15 g, 0.15* 4.11*     Cardiac:   Recent Labs     10/03/20  1758   TROPONINT < 0.010     Lipids: No results for input(s): CHOL, HDL in the last 72 hours. Invalid input(s): LDLCALCU  Coagulation: No results for input(s): INR in the last 72 hours. Lactic Acid:   Recent Labs     10/03/20  1939 10/04/20  0510   LACTA 1.5 1.7      ABGs:   Lab Results   Component Value Date    PH 7.25 10/03/2020    PCO2 70 10/03/2020    PO2 72 10/03/2020    HCO3 31 10/03/2020    O2SAT 91 10/03/2020     Lab Results   Component Value Date    IFIO2 100 10/03/2020    MODE AC 10/03/2020    SETTIDVOL 550 10/03/2020    SETPEEP 14.0 10/03/2020       Radiology/Imaging:  XR CHEST PORTABLE [[id number]]  Collected: 10/04/20 0328        Order Status: Completed  Updated: 10/04/20 0429       Narrative:         Exam: One View of the chest     Comparison: 10/3/2020     Findings:   Endotracheal tube is approximately 4.8 cm above the ila. NG tube is off the bottom of the image but is at least within the stomach. Right-sided subclavian central venous catheter has its tip in the mid SVC. Cardiac silhouette is enlarged. Bilateral diffuse airspace disease that may represent pulmonary edema   versus atypical pneumonia. No pleural fluid collections. No pneumothorax.       Impression:         Impression:   1.  Tubes and line are as described above. 2.  Bilateral diffuse airspace disease appears similar to the prior study   and may represent pulmonary edema versus atypical pneumonia     This document has been electronically signed by: Zakia Silva MD on   10/04/2020 04:28 AM       CTA CHEST [address] [[id number]]  Resulted: 10/03/20 2014       Order Status: Completed  Updated: 10/03/20 2016       Narrative:         PROCEDURE: CTA CHEST W WO CONTRAST     CLINICAL INFORMATION: Hypoxia; elevated d-dimer. COMPARISON: 9/13/2018.      TECHNIQUE:   1.5 mm axial images were obtained through the chest 6 after the administration of IV contrast. A non-contrast localizer was obtained. 2mm MIP reconstructions of the chest performed in coronal and sagittal planes. All CT scans at this facility use dose modulation, iterative reconstruction, and/or weight based dosing when appropriate to reduce the radiation dose to as low as reasonably achievable. CONTRAST: 80 mL Isovue-370 intravenously. FINDINGS:   POSTOPERATIVE CHANGES: None. LINES/TUBES/MECHANICAL DEVICES:   1. The distal tip of the endotracheal tube is 3.3 cm above the level of the ila. 2. The esophageal tube extends to the stomach. 3. There is a right jugular central venous catheter with the distal tip within the superior vena cava. HEART/MEDIASTINUM:   1. The heart size is normal.   2. There is no pericardial fluid or thickening. PULMONARY ARTERIES:   1. There is no pulmonary embolus. THORACIC AORTA:   1. There is no aneurysm or dissection. LUNG RATLIFF - INFILTRATE/PLEURAL EFFUSION:   1. There are small bilateral pleural effusions, right greater than left. There is infiltrate throughout both lung fields with additional consolidation within the mid and lower chest. Clinical management is recommended. Follow-up chest radiographs are   also recommended to confirm complete resolution. LUNG RATLIFF - MASS/NODULE:   1. The extensive consolidation and infiltrate from an evaluation of pulmonary nodules. LYMPHADENOPATHY:   1. There are mediastinal and hilar lymph nodes which may be reactive related to the inflammatory process. PNEUMOTHORAX: None. THYROID GLAND: Unremarkable. TRACHEA/ESOPHAGUS: Normal.     MUSCULOSKELETAL:   1. There is no acute musculoskeletal abnormality. UPPER ABDOMEN: Unremarkable. OTHER: None.         Impression:         1. The distal tip of the endotracheal tube is 3.3 cm above the level of the ila. 2. The esophageal tube extends to the stomach.      3. There is a right jugular central venous catheter with the distal tip within the superior vena cava. 4. There are small bilateral pleural effusions, right greater than left. There is infiltrate throughout both lung fields with additional consolidation within the mid and lower chest. Clinical management is recommended. Follow-up chest radiographs are   also recommended to confirm complete resolution. 5. There are mediastinal and hilar lymph nodes which may be reactive related to the inflammatory process. A follow-up CT examination of the chest with intravenous contrast in 3 months is recommended to confirm stability/resolution. 6. There is no pulmonary embolus. **This report has been created using voice recognition software.  It may contain minor errors which are inherent in voice recognition technology. **     Final report electronically signed by Dr. Aloma Mcardle on 10/3/2020 8:14 PM       XR CHEST PORTABLE [[id number]]  Resulted: 10/03/20 1927       Order Status: Completed  Updated: 10/03/20 1929       Narrative:         PROCEDURE: XR CHEST PORTABLE     CLINICAL INFORMATION: Shortness of breath. COMPARISON: No prior study. TECHNIQUE: AP portable chest radiograph performed. FINDINGS:   POSTSURGICAL CHANGES: None. LINES/TUBES/MECHANICAL DEVICES:   1. The distal tip of the endotracheal tube is 5.2 cm above the level of the ila. 2. The esophageal tube extends into the stomach and off the inferior aspect of the image. 3. There is a right subclavian central venous catheter with the distal tip overlying the superior vena cava. TRACHEA/HEART/MEDIASTINUM/HILUM:   1. There is mild prominence/magnification of the cardiac silhouette. LUNG RATLIFF:   1. There are infiltrates throughout both lung fields with a predominantly perihilar distribution. There is no pleural effusion. OTHER: None. PNEUMOTHORAX:  None. OSSEOUS STRUCTURES:   1. No acute osseous abnormality.         Impression:         1.  The distal tip of the endotracheal tube is 5.2 cm above the level of the ila. 2. The esophageal tube extends into the stomach and off the inferior aspect of the image. 3. There is a right subclavian central venous catheter with the distal tip overlying the superior vena cava. There is no pneumothorax. 4. There are infiltrates throughout both lung fields with a predominantly perihilar distribution. There is no pleural effusion. **This report has been created using voice recognition software.  It may contain minor errors which are inherent in voice recognition technology. **     Final report electronically signed by Dr. Tiffany Bravo on 10/3/2020 7:27 PM       XR CHEST PORTABLE [[id number]]        Order Status: Canceled              Patient Active Problem List   Diagnosis    Angina of effort ([address]    Respiratory failure ([address]       Assessment and Plan:  · Acute Hypercapnic/Hypoxic Respiratory Failure:  Debated in the emergency department on 10/3/2020. Requiring high FiO2 and PEEP however we are able to wean FiO2 down, he is down to 65% now and continue to wean. No need for proning.sedation with propofol and Precedex. PCV mode of mechanical ventilation with goal peak pressure 35 or less and plateau pressure 30 or less. Repeat chest x-ray in a.m. · Bilateral Pneumonia of unidentified organism:   Patient had low-grade fever, elevated procalcitonin and elevated WBC. CT of the chest shows bilateral consolidation and groundglass appearance that can be seen in pulmonary edema, however the patient BNP is low and he had elevated procalcitonin, WBC and fever. COVID was repeated and it is negative twice. Molecular pneumonia panel and respiratory culture. MRSA swab is negative, continue IV Zosyn. Repeat chest x-ray in a.m. · Acute renal failure/Hyperkalemia: Patient creatinine is 1.6 baseline around 1. Potassium is 5.7. We will give Lasix and monitor potassium level. Repeat BMP in evening  · Acute UTI:  On Zosyn. Trend urine culture. ·  Systolic Heart Failure:  Echocardiogram on 2/24/2020 demonstrated EF 35 to 40% and trace mitral regurg. CT scan demonstrates pleural effusions and groundglass appearance although BNP normal and patient clinically does not look hypervolemic. Limited echo. Strict intake and output and daily weights. Follow-up troponins  · COPD/Asthma: (Not in exacerbation)  Most recent PFTs February 2020 demonstrated FEV1 69%. Continue rhonchi dilators   · polycythemia:  Noted previous history, IVF. · Diabetes Mellitus Type #2: (Uncontrolled) Hemoglobin A1C 8.7%, average glucose 203 on 7/15/2020. Hold metformin for 48 hrs.  secondary to IV contrast administration. Hold Actos and Victoza. Siding scale insulin and Lantus. · Essential Hypertension: Continue beta-blocker. Hold Cozaar and HCTZ as patient has acute renal failure  · Urinary Retention: Unable to administer Flomax as it cannot be crushed per OG tube. Substitute Proscar for outpatient dutasteride. · JOSE A:   Needs home CPAP with extubation. · Hyperlipidemia: Continue statin. · Coronary artery disease: patient did undergo cardiac catheterization in January 2020 which demonstrated no significant coronary artery disease.     CCM: [address], [place]

## 2020-10-04 NOTE — ED NOTES
Pt sister at bedside. Pt on ventilator. Pt respirations easy and unlabored. Pt remains calm. RASS score at -1.       Karol Tellez RN  10/03/20 2030

## 2020-10-04 NOTE — PLAN OF CARE
Problem: Restraint Use - Nonviolent/Non-Self-Destructive Behavior:  Goal: Absence of restraint indications  Description: Absence of restraint indications  Outcome: Ongoing  Note: Pt attempting to grab at ETT tube     Problem: Restraint Use - Nonviolent/Non-Self-Destructive Behavior:  Goal: Absence of restraint-related injury  Description: Absence of restraint-related injury  Outcome: Ongoing  Note: Circulation checks every two hours      Problem: Urinary Elimination:  Goal: Signs and symptoms of infection will decrease  Description: Signs and symptoms of infection will decrease  Outcome: Ongoing  Note: [place] care is being done every shift and catheter bag is remaining below bladder.       Problem: Urinary Elimination:  Goal: Complications related to the disease process, condition or treatment will be avoided or minimized  Description: Complications related to the disease process, condition or treatment will be avoided or minimized  Outcome: Ongoing  Note: Temperature being monitored continuously to assess for signs of infection      Problem: Infection - Central Venous Catheter-Associated Bloodstream Infection:  Goal: Will show no infection signs and symptoms  Description: Will show no infection signs and symptoms  Outcome: Ongoing  Note: Pt being turned Q2 hours and sacral patch applied to help reduce possibility of breakdown      Problem: Skin Integrity:  Goal: Will show no infection signs and symptoms  Description: Will show no infection signs and symptoms  Outcome: Ongoing  Note: Pt being turned Q2 hours and sacral patch applied to help reduce possibility of breakdown      Problem: Skin Integrity:  Goal: Absence of new skin breakdown  Description: Absence of new skin breakdown  Outcome: Ongoing  Note: No current signs of breakdown on assessment

## 2020-10-05 NOTE — PROGRESS NOTES
Comprehensive Nutrition Assessment    Type and Reason for Visit:  Initial, Consult(TF)    Nutrition Recommendations/Plan:   If pt continues intubated, plan Vital 1.2 TF with goal of 50 ml/hr to provide 1440 kcals TF ( 1941 kcals with diprivan) & 90 grams protein. Discussed with Marco A RANDALL. Nutrition Assessment:    Pt. nutritionally compromised AEB NPO. At risk for further nutrition compromise r/t intubated, pneumonia, CHF and underlying medical condition (COPD, DM, diarrhea, depression, HTN ). Nutrition recommendations/interventions as per above. Malnutrition Assessment:  Malnutrition Status: At risk for malnutrition (Comment)    Context:  Acute Illness     Findings of the 6 clinical characteristics of malnutrition:  Energy Intake:  Unable to assess  Weight Loss:  No significant weight loss     Body Fat Loss:  No significant body fat loss     Muscle Mass Loss:  No significant muscle mass loss    Fluid Accumulation:  1 - Mild     Strength:  Not Performed    Estimated Daily Nutrient Needs:  Energy (kcal):  ~2299-4002 kcals ( 15-18 kcals/kg); Weight Used for Energy Requirements:  Admission(10/3 106 kg)     Protein (g):   ; Weight Used for Protein Requirements:       ~88 grams ( 1.3 grams/kg)   monitor renal status on IBW of 67.2 kg  Fluid (ml/day):  ~88 grams ( 1.3 grams/kg)   monitor reanl status; Weight Used for Fluid Requirements:  Ideal(67.2 kg)      Nutrition Related Findings:  Pt intubated, sedated with, precedex &  diprivan & at current rate providing ~501 kcals in lipid emulsion. pt alert. Per RN hoprng to extubate. Discussed with Marco A RANDALL & if unable to extubate pt , TF recs in.  BUN 16, Cr 1.2, glucose 126, WBC 13.4. 7/15/20 A1c 8.7% meds include steroid, lantus, Humalog & MVI      Wounds:  None(per RN)       Current Nutrition Therapies:    NPO    Anthropometric Measures:  · Height: 5' 7\" (170.2 cm)  · Current Body Weight: 233 lb 7.5 oz (105.9 kg)(10/5 +1 edema)   · Admission Body Weight: 233 lb 11 oz (106 kg)(10/3 +1 edema)    · Usual Body Weight: 229 lb (103.9 kg)(7/1/20)     · Ideal Body Weight: 148 lbs;     · BMI: 36.6  · BMI Categories:     Obese class 2    Nutrition Diagnosis:   · Inadequate oral intake related to impaired respiratory function as evidenced by intubation(NPO)      Nutrition Interventions:   Food and/or Nutrient Delivery:  (If pt continues intubated, plan Vital 1.2 TF with goal of 50 ml/hr to provide 1440 kcals TF ( 1941 kcals with diprivan) & 90 grams protein)  Nutrition Education/Counseling:  No recommendation at this time   Coordination of Nutrition Care:  Continued Inpatient Monitoring, Interdisciplinary Rounds    Goals:  TF to provide % of nutrient needs while pt is intubated       Nutrition Monitoring and Evaluation:     Food/Nutrient Intake Outcomes:  Enteral Nutrition Intake/Tolerance, Diet Advancement/Tolerance  Physical Signs/Symptoms Outcomes:  Biochemical Data, Chewing or Swallowing, GI Status, Fluid Status or Edema, Nutrition Focused Physical Findings, Skin, Weight     Discharge Planning:     Too soon to determine     Electronically signed by Kaleb Joya RD, LD on 10/5/20 at 9:57 AM EDT    Contact: [phone]

## 2020-10-05 NOTE — PROGRESS NOTES
Patient:  Evelio Figueroa    Unit/Bed:4D-03/003-A  MRN: [id number]   PCP: Aubery Simmonds, APRN - CNP  Date of Admission: 10/3/2020    Assessment and Plan(All pulmonary edema, renal failure, PE, and respiratory failure diagnoses are acute in nature unless otherwise specified):        1. Acute hypoxic respiratory failure: secondary to severe pneumonia. Intubated on 10/3 after failure of continuous BiPAP trial. The patient was able to tolerate SBT today 10/5. RSBI 32 with EtCO2 of 35 and SpO2 of 95 on 35% FiO2. Spontanteous VT was 630 and RR 23 breaths/min. The patient was on SBT for 60 minutes. Extubate today. 2. Severe pneumonia, bilateral: Diffuse bilateral disease noted on CTA and CXR consistent w/ PNA. Febrile to 100.6, leukocytosis, Procal 4. 11. Respiratory culture preliminary no growth. Molecular pneumonia panel negative. COVID-19 screen negative x2. Continue decadron 4 mg daily for 4 days. Continue zosyn (10/4-present). Follow up culture results. 3. Acute kidney injury w/ hyperkalemia: Improving. Cr 1.3 today 10/5, from peak of 1.6 on 10/4. Baseline 1.0-1. 18. S/p lasix 40 mg one time 10/4, w/ improvement of K to 4.4. Monitor BMP daily. Urine output adequate; continue to monitor I/O q8h. Assess FENa. Continue IVF w/ 0.9% mL/h.  4. Bilateral pleural effusions: Small, R>L on CTA 10/3. S/p lasix 40 mg once 10/4. Pro-BNP normal. Repeat CXR tomorrow AM.   5. Complicated UTI: urine culture positive E.Coli. Continue zosyn as above for UTI and pneumonia coverage. 6. DM type II: Uncontrolled. HgbA1C 8.7% (7/2020). Gluc 126-182 overnight. Continue nightly lantus 20 units, and q4h SSI lispro low-dose correction. Holding home metformin, victoza and actos. 7. COPD/Asthma, history of: Not in acute exacerbation. Continue symbicort, spiriva. Denies dyspnea, wheezing at this time. Last PFTs (2/2020) showed FEV1 69%. 8. Chronic systolic HFrEF, history of: Last EF (2/2020) was 35-40%. Ischemic cardiomyopathy history.  Not in acute exacerbation. Pro-BNP normal, 570.5 (10/4). Troponin negative. TSH normal. S/p lasix 40 mg IV once 10/4. Euvolemic on exam. Normotensive, HR 86. Holding home losartan, thiazide for DAI w/ hyperkalemia. Continue home BB metoprolol. 9. Sleep apnea, history of: resume home noct CPAP. Continue home modafinil. 10. HTN: /97 today. Continue home metoprolol BID. Holding home losartan, thiazide given DAI w/ hyperkalemia. 11. CAD, history of: Last cardiac cath 1/2020. Demonstrated no sgnificant CAD at that time. Denies chest pain. Troponin negative. Continue home aspirin 81 mg daily. 12. Polycythemia, history of: Hgb 14.1 today. Monitor CBC daily. 15. Urinary retention, history of: Continue proscar. Holding home flomax. Monroe catheter in place. Urine output adequate. 14. HLD, history of: Continue home lipitor. Last lipid panel (7/2020): total chol 199, HDL 36, LDL 96, Trig 504.  15. GERD, history of: Continue pepcid 20 mg BID. 16. Anxiety, depression:  Continue home buspar. CC:  Dyspnea  HPI: Azra Carey is a 76 y.o. male former-smoker (quit 1992) w/ a PMH of HTN, HLD, DM type II, asthma, COPD, sleep apnea (uses home CPAP), loss of vision (s/p right eye surgery), GERD, migraines, anxiety and depression. He presented to [facility] ED on 10/3/2020, w/ compaints of increasing dyspnea over several weeks, but acutely worsened the day of presentation. On arrival, SpO2 was 76% on room air. He was placed on NRB support, w/ improvement. He continued to be tachypneic and labored. BiPAP therapy was started w/ no improvement. He required subsequent intubation. He received 3x albuterol and 1x Duoneb treatments, 125 mg of solu-medrol IV, and lopressor 5 mg IV once. SBP was 200 on arrival. He dropped to SBP 90 after lopressor was given. He was admitted to ICU for further care. CTA of the chest showed bilateral infiltrates w/ combination of consilidation w/ extensive groundglass appearance.  COVID-19 screen was negative twice. He developed an DAI, and procal was elevated to 4. 11. Temp was 100. 6. Zosyn was initiated w/ IV decadron for severe pneumonia. Vanc was discontinued once MRSA screen was negative. Urine culture grew E.Coli. Today, 10/5/20, he is hemodynamically stable and following commands. Passed SBT. He will be extubated today. ROS: Review of Systems   Constitutional: Negative for chills, diaphoresis, fatigue and fever. HENT: Negative for congestion, hearing loss, sinus pressure and sore throat. Eyes: Negative for redness and visual disturbance. Respiratory: Negative for cough, chest tightness, shortness of breath and wheezing. Cardiovascular: Negative for chest pain, palpitations and leg swelling. Gastrointestinal: Negative for abdominal distention, abdominal pain, constipation, diarrhea, nausea and vomiting. Genitourinary: Negative for decreased urine volume, difficulty urinating and dysuria. Monroe catheter in place   Musculoskeletal: Negative for back pain and neck pain. Skin: Negative for color change, rash and wound. Neurological: Positive for speech difficulty (\"weak. \"). Negative for dizziness, light-headedness and headaches. Psychiatric/Behavioral: Negative for agitation and confusion. The patient is not nervous/anxious. PMH:  Per HPI  SHX:  Former-smoker (15 pack-years; quit 1992). Endorses occasional alcohol use. Denies substance use. FHX: Mother- No known. Father- no known.    Allergies: NKDA  Medications:     dexmedetomidine 0.5 mcg/kg/hr (10/05/20 1110)    sodium chloride 125 mL/hr at 10/05/20 0809    dextrose        piperacillin-tazobactam  3.375 g Intravenous Q8H    famotidine (PEPCID) injection  20 mg Intravenous BID    aspirin  81 mg Oral Daily    atorvastatin  10 mg Oral TID    baclofen  10 mg Oral Daily    therapeutic multivitamin-minerals  1 tablet Oral Daily    pregabalin  75 mg Oral BID    tiotropium  2 puff Inhalation Daily    sodium chloride flush  10 mL Intravenous 2 times per day    enoxaparin  40 mg Subcutaneous Daily    insulin lispro  0-6 Units Subcutaneous Q4H    dexamethasone  4 mg Intravenous Daily    [Held by provider] losartan  50 mg Oral Daily    And    [Held by provider] hydroCHLOROthiazide  12.5 mg Oral Daily    metoprolol  50 mg Oral BID    busPIRone  5 mg Oral TID    finasteride  5 mg Oral Daily    modafinil  100 mg Oral Daily    budesonide-formoterol  2 puff Inhalation BID    insulin glargine  20 Units Subcutaneous Nightly       Vital Signs:   T: 98.2: P: 86 RR: 18 B/P: 154/97: FiO2: 35%: O2 Sat:94%: I/O: 4511/5275 (-764) GCS: 15  Body mass index is 36.57 kg/m². [place] Lance PC: 16/6: TV: 430: RRTotal: 18: Ti:1 sec: ETCO2: 35. SBT:  16/6:   RR: 18: Spont TV: 430: ETCO2: 35   General:   Acute on chronically ill adult. Appears stated age. HEENT:  normocephalic and atraumatic. No scleral icterus. PERR  Neck: supple. No Thyromegaly. Lungs: clear to auscultation, slight exp wheeze. Normal rate, pattern. Unlabored. No retractions  Cardiac: RRR. S1/S2. No murmur. No JVD. Abdomen: soft. Non-distended. Nontender. BS active x4 quadrants. Extremities:  No clubbing, cyanosis, or edema x 4. Vasculature: capillary refill < 3 seconds. Palpable 2+ dorsalis pedis pulses. Skin:  warm and dry. No rash. Intact. Psych:  Alert and oriented x4. Affect appropriate  Lymph:  No supraclavicular adenopathy. Neurologic:  No focal deficit. No seizures. Follows commands. Moves x4 extremities equally, spontaneously. Data: (All radiographs, tracings, PFTs, and imaging are personally viewed and interpreted unless otherwise noted).  Telemetry: NSR. HR 86. No ectopy.  12-lead ECG (10/4): sinus tachycardia, ventricular rate 104. T-wave inversion in anterolateral leads, No ST elevation/depression.     Na 143, K 4.4, Cl 108, CO2 26, BUN 16, Cr 1.3, Gluc 126-182, Ca 8.3   WBC 13.4, Hgb 14.1, Hct 44.5, Plt 140   ICa 1.08 (10/4), Mg 1.9 (10/4)   Lactic acid 1.7 (10/4), procal 4.11 (10/4)   Pro-.5 (10/4), Troponin <0.010 (10/4), TSH 0.656 (10/4)   Albumin 3.6, alk phos 129, ALT 29, AST 28, bili 0.9, total protein 6.9 (10/4)   Blood culture 1 (10/4): preliminary no growth   Blood culture 2 (10/4): preliminary no growth   Respiratory culture (10/4): preliminary no growth   UA (10/4): yellow, moderate blood, protein 300, positive nitrites, small leukocyte, 8-15 casts, WBC , many bacteria   Urine culture (10/3): E. Coli   Urine culture (10/4): enteric gram neg bacilli    Molecular pneumonia panel (10/4): negative   MRSA screen (10/4): negative   VRE screen (10/4): negative   COVID-19 screen (10/4): negative   CXR (10/4) report: Tubes and line are as described above. Bilateral diffuse airspace disease appears similar to the prior study and may represent pulmonary edema versus atypical pneumonia.  CTA w/ contrast (10/3) report: The distal tip of the endotracheal tube is 3.3 cm above the level of the ila. The esophageal tube extends to the stomach. There is a right jugular central venous catheter with the distal tip within the superior vena cava. There are small bilateral pleural effusions, right greater than left. There is infiltrate throughout both lung fields with additional consolidation within the mid and lower chest. Clinical management is recommended. Follow-up chest radiographs are also recommended to confirm complete resolution. There are mediastinal and hilar lymph nodes which may be reactive related to the inflammatory process. A follow-up CT examination of the chest with intravenous contrast in 3 months is recommended to confirm stability/resolution. There is no pulmonary embolus. Case discussed w/ Dr. Asif Lucas. Electronically signed by HOSEA Srivastava                                     Patient seen by me. Case discussed with nurse practitioner.   Patient was given a trial of extubation after passing spontaneous breathing trial on 10/5/2020. However, he developed tachypnea and subsequently hypoxemia post extubation. Chest x-ray is consistent with pulmonary edema. Patient has a baseline ejection fraction of 35%. Patient has been under diuresed. Initiate trial of BiPAP. Initiate aggressive diuresis. Patient at high risk for reintubation. PCR of airway was negative for bacteria or virus. Radiographically findings are more consistent with a pulmonary edema pattern. Patient growing E. coli from the urine. On Zosyn. CC time 35 minutes. Time was discontiguous. Time does not include nurse practitioner assessment. Time does include my direct assessment and coordination of care. Time does not include procedures.

## 2020-10-05 NOTE — PLAN OF CARE
Problem: Restraint Use - Nonviolent/Non-Self-Destructive Behavior:  Goal: Absence of restraint indications  Description: Absence of restraint indications  Outcome: Not Met This Shift  Note: Pulls at ett when untied      Problem: Restraint Use - Nonviolent/Non-Self-Destructive Behavior:  Goal: Absence of restraint-related injury  Description: Absence of restraint-related injury  Outcome: Ongoing  Note: No red or open areas      Problem: Urinary Elimination:  Goal: Signs and symptoms of infection will decrease  Description: Signs and symptoms of infection will decrease  Outcome: Ongoing  Note: Contniues with no fever- urine clear yellow      Problem: Urinary Elimination:  Goal: Complications related to the disease process, condition or treatment will be avoided or minimized  Description: Complications related to the disease process, condition or treatment will be avoided or minimized  Outcome: Ongoing  Note: None at present      Problem: Infection - Central Venous Catheter-Associated Bloodstream Infection:  Goal: Will show no infection signs and symptoms  Description: Will show no infection signs and symptoms  Outcome: Ongoing  Note: No evidence of at present      Problem: Skin Integrity:  Goal: Will show no infection signs and symptoms  Description: Will show no infection signs and symptoms  Outcome: Ongoing  Note: No evidence of at present      Problem: Skin Integrity:  Goal: Absence of new skin breakdown  Description: Absence of new skin breakdown  Outcome: Ongoing  Note: None at present      Problem: Falls - Risk of:  Goal: Will remain free from falls  Description: Will remain free from falls  Outcome: Ongoing  Note: Continues on bedrest- bed exit alarm on - bed on low position - unable to use call light at this time   Goal: Absence of physical injury  Description: Absence of physical injury  Outcome: Ongoing  Note: None at present      Problem: Falls - Risk of:  Goal: Will remain free from falls  Description: Will remain free from falls  Outcome: Ongoing  Note: Continues on bedrest- bed exit alarm on - bed on low position - unable to use call light at this time      Problem: Falls - Risk of:  Goal: Absence of physical injury  Description: Absence of physical injury  Outcome: Ongoing  Note: None at present      Problem: Nutrition  Goal: Optimal nutrition therapy  10/5/2020 1715 by Claire Contreras RN  Outcome: Ongoing  Note: Tube feed started   10/5/2020 8069 by Georgia Marques RD, LD  Outcome: Ongoing     Problem: Impaired respiratory status  Goal: Will be able to breathe spontaneously, without ventilator support  Description: Will be able to breathe spontaneously, without ventilator support  10/5/2020 1715 by Claire Contreras RN  Outcome: Not Met This Shift  Note: Extubated   - reintubated   10/5/2020 1004 by Kim Tsang RCP  Note: The patient was able to tolerate SBT. RSBI  was 32 with EtCO2 of 35 and SpO2 of 95 on 35% FiO2. Spontanteous VT was 630 and RR 23 breaths/min. The patient was on SBT for 60minutes    Care plan reviewed with patient and family . Patient  expresses and family  verbalizes understanding of the plan of care and contribute to goal setting.

## 2020-10-05 NOTE — PLAN OF CARE
[address] THERAPY MISSED TREATMENT NOTE  [facility] ICU 4D  4D-03/003-A      Date: 10/5/2020  Patient Name: Yusuf Thomson        CSN: [id number]   : 1955  (76 y.o.)  Gender: male                REASON FOR MISSED TREATMENT: Pt extubated this am and reintubated this pm. Will check bacl for appropriateness of early mobility on 10/6/20.

## 2020-10-05 NOTE — PLAN OF CARE
Problem: Impaired respiratory status  Goal: Will be able to breathe spontaneously, without ventilator support  Description: Will be able to breathe spontaneously, without ventilator support  Note: The patient was able to tolerate SBT. RSBI  was 32 with EtCO2 of 35 and SpO2 of 95 on 35% FiO2. Spontanteous VT was 630 and RR 23 breaths/min.   The patient was on SBT for 60minutes

## 2020-10-05 NOTE — PROGRESS NOTES
Pt intubated on first attempt with 8/0 ett. Good bilat breath sounds and color change on co2 detector.  ett secured at 24 cm. cxr obtained

## 2020-10-05 NOTE — PROCEDURES
ICU PROCEDURE - ENDOTRACHEAL INTUBATION    Dereje Echeverria     MRN#: [id number]  10/5/20      [email or web address]     : 1955      INDICATION: Hypoxemic respiratory failure    TIME OUT: taken    Permission obtained, risks/benefits reviewed:    ANESTHESIA:   [x]Ketamine  []Ativan  [] Morphine  [x]Propofol  []Other medications:      ESTIMATED BLOOD LOSS:  None. COMPLICATIONS:  [x]N/A  [] Other:    LARYNGOSCOPIC AIRWAY GRADE (CORMACK-LEHANE):[]1  [x]2a  []2b []3  []4        INTUBATION EQUIPMENT USED:  [x] Direct laryngoscope only    OUTCOME: Successful placement of #  8.0  Taperguard Evac endotracheal via   [x]Oral route    INSERTION DEPTH:  22    cm from   [x]lip           CONFIRMATION OF TUBE POSITION:   [x]Capnography - Strong & repeatable exhaled CO2 detection   [x]Multiple point auscultation   [x]SpO2 response   [x]STAT X-ray   []Bronchoscopic assessment    UNUSUAL FINDINGS:    PROCEDURE:     Using direct laryngoscopy, the vocal cords were visualized and the endotracheal tube was placed through the cords under direct vision. Good breath sounds were auscultated bilaterally without sounds over abdomen. Appropriate strong & repeatable exhaled CO2 detection was confirmed.        Electronically signed by Venita Rojas MD on 10/5/2020 at 2:07 PM

## 2020-10-05 NOTE — PROGRESS NOTES
Pt resting quietly on vent - respirations easy, unlabored. Follows commands x 4 extremities. Discussed pt with [place] cnp - will lower propofol and increase precedex as needed for planned cpap trial   0900 pt awake, calm at present - lifts head off bed- rt will place on cpap . 6713 placed on spontaneous breathing trial - respirations continue easy , unlabored. 21  continues on cpap , respirations easy, unlabored  1016 extubated successfully to 3 l nasal cannula . No problems noted - able to verbalize . 1100 continues with no distress   1215  Pt becoming increasingly short   Of  breath   .placed on cpap per rt   1225 pt complains shortness of breath continued-states cpap not helped at this time . anxious at present  . Lung sounds expiratory wheeze  Tight- pt very short of breath. Yamil ugarte here at bedside  reattempting cpap - pt unable to tolerate- placed on high maggy -   1247 2nd alubuterol treatment . started. 1300 petty ugarte remains at bedside with pt . Wife and son remain at bedside and express understanding of condition.  Pt placed on bipap - work  breathing eased but remains tachypneic   1350 continues on bipap  tachypneic- [place] cnp spoke with pt and wife regarding need for re-intubation - both express understanding

## 2020-10-05 NOTE — SIGNIFICANT EVENT
Mr. Jose Bolton was successfully extubated this morning after passing SBT for 60 minutes on minimal ventilatory support settings. RSBI was 32 with EtCO2 of 35 and SpO2 of 95 on 35% FiO2. He was successfully extubated to 3L NC. He tolerated this well, but began to decompensate after a of couple hours thereafter. He was hypoxic and tachypneic w/ respiratory rate 30's, despite multiple interventions including: multiple albuterol nebulizers, high flow nasal cannula, lasix 60 mg IV push, and finally continuous BiPAP support. I discussed with the patient and his wife at the bedside the need for re-intubation at this time. We discussed risks and benefits. He and his wife agreed to continue with intubation.

## 2020-10-05 NOTE — CARE COORDINATION
10/5/20, 8:15 AM EDT  DISCHARGE PLANNING EVALUATION:    Moo Case       Admitted from: ED 10/3/2020/ [facility]: 2   Location: -03/003-A Reason for admit: Respiratory failure ([facility] [J96.90] Status: IP  Admit order signed?: yes  PMH:  has a past medical history of Acid reflux, Anxiety, Arthritis, Asthma, Change in bowel habits, Constipation, COPD (chronic obstructive pulmonary disease) ([facility] COPD (chronic obstructive pulmonary disease) ([facility] Depression, Diarrhea, Heartburn, Hyperlipidemia, Hypertension, Hypogonadism male, Loss of vision, Migraine, Neuralgia, Shortness of breath, Sleep apnea, Testosterone deficiency in male, and Type 2 diabetes mellitus ([facility] Procedure:   10/3 Intubated  10/3 CVC right subclavian  10/3 CTA Chest: There are small bilateral pleural effusions, right greater than left. There is infiltrate throughout both lung fields with additional consolidation within the mid and lower chest; There are mediastinal and hilar lymph nodes which may be reactive related to the inflammatory process; neg for PE  10/3 CXR: There are infiltrates throughout both lung fields with a predominantly perihilar distribution. There is no pleural effusion  10/4 CXR:   1.  Tubes and line are as described above.     2.  Bilateral diffuse airspace disease appears similar to the prior study    and may represent pulmonary edema versus atypical pneumonia     10/5 Extubated    Medications:  Scheduled Meds:   piperacillin-tazobactam  3.375 g Intravenous Q8H    chlorhexidine  15 mL Mouth/Throat BID    famotidine (PEPCID) injection  20 mg Intravenous BID    aspirin  81 mg Oral Daily    atorvastatin  10 mg Oral TID    baclofen  10 mg Oral Daily    therapeutic multivitamin-minerals  1 tablet Oral Daily    pregabalin  75 mg Oral BID    tiotropium  2 puff Inhalation Daily    sodium chloride flush  10 mL Intravenous 2 times per day    enoxaparin  40 mg Subcutaneous Daily    insulin lispro  0-6 Units Subcutaneous Q4H    dexamethasone  4 mg Intravenous Daily    [Held by provider] losartan  50 mg Oral Daily    And    [Held by provider] hydroCHLOROthiazide  12.5 mg Oral Daily    metoprolol  50 mg Oral BID    busPIRone  5 mg Oral TID    finasteride  5 mg Oral Daily    modafinil  100 mg Oral Daily    budesonide-formoterol  2 puff Inhalation BID    insulin glargine  20 Units Subcutaneous Nightly     Continuous Infusions:   dexmedetomidine 0.6 mcg/kg/hr (10/05/20 0756)    propofol 35 mcg/kg/min (10/05/20 0758)    sodium chloride 125 mL/hr at 10/05/20 0809    dextrose        Pertinent Info/Orders/Treatment Plan: Presented with c/o increasing SOB over several weeks that worsened on day of presentation. Sats 76% on room air on arrival to ED, placed on NSB mask.  in ED. Remained tachypneic with labored respirations and was placed on bipap, failed and required intubation in ED. Severe pneumonia; acute cystits, and acute exacerbation systolic heart failure. Extubated this morning to 3L O2. Later required vapotherm, respiratory distress, now on bipap and getting IV lasix. Bipap 18/8 with FIO2 50%, sats 95%. Afebrile. NSR. Ox4. Dietitian consulted. PT/OT. Urine +EColi. COVID 19 was negative. Telemetry, I&O, daily weight, miller care. IVF, precedex @ 0.8 mcg/kg/hr, asa, lipitor, baclofen, inhaler, buspar, IV decadron 4 mg daily, lovenox, pepcid, IV lasix 60 mg bid, proscar, lantus, SSI Q4H, lopressor, provigil, IV zosyn, lyrica. Received 1L fld bous and 40 mg IV lasix x1 yesterday. Received 60 mg IV lasix x1 this afternoon. Creat 1 - now 1.3, procal 0.15 - then 4.11, trop neg - 0.024 - now 0.016, alk phos 129, wbc 12.7 - now 13.4, d-dimer 940. Blood and respiratory cultures sent. Diet: No diet orders on file   Smoking status:  reports that he quit smoking about 28 years ago. His smoking use included cigarettes. He started smoking about 43 years ago. He has a 15.00 pack-year smoking history.  He has never used smokeless tobacco.   PCP: KRYSTAL Noel CNP  Readmission 30 days or less: no  Readmission Risk Score: 21%    Discharge Planning Evaluation  Current Residence:     Living Arrangements:      Support Systems:     Current Services PTA:     Potential Assistance Needed:     Potential Assistance Purchasing Medications:     Does patient want to participate in local refill/ meds to beds program?     Type of Home Care Services:     Patient expects to be discharged to:     Expected Discharge date: Follow Up Appointment: Best Day/ Time:      Patient Goals/Plan/Treatment Preferences: Spoke with Joe's wife, Juan Suero; states Holly Reyez lives at home alone and uses a cpap and nebulizer. He uses a cane occasionally. He did not have any HH services PTA. He drives, cares for himself independently, and has a PCP. Plan pending clinical course. PT/OT to epi.   Transportation/Food Security/Housekeeping Addressed:  No issues identified.     Evaluation: no

## 2020-10-05 NOTE — PLAN OF CARE
Problem: Restraint Use - Nonviolent/Non-Self-Destructive Behavior:  Goal: Absence of restraint indications  Description: Absence of restraint indications  10/4/2020 2242 by Booker Olguin RN  Outcome: Ongoing  Note: Pt continues to reach for ETT tube and lines     Problem: Restraint Use - Nonviolent/Non-Self-Destructive Behavior:  Goal: Absence of restraint-related injury  Description: Absence of restraint-related injury  10/4/2020 2242 by Booker Olguin RN  Outcome: Ongoing  Note: Circulation checks are being done every two hours to assess for any restraint related injury      Problem: Urinary Elimination:  Goal: Signs and symptoms of infection will decrease  Description: Signs and symptoms of infection will decrease  10/4/2020 2242 by Booker Olguin RN  Outcome: Ongoing  Note: Pt is having miller care done every shift and catheter bag is remaining below the bladder     Problem: Urinary Elimination:  Goal: Complications related to the disease process, condition or treatment will be avoided or minimized  Description: Complications related to the disease process, condition or treatment will be avoided or minimized  10/4/2020 2242 by Booker Olguin RN  Outcome: Ongoing  Note: Temperature is being measured continuously, and site assessment is being done every assessment      Problem: Infection - Central Venous Catheter-Associated Bloodstream Infection:  Goal: Will show no infection signs and symptoms  Description: Will show no infection signs and symptoms  10/4/2020 2242 by Booker Olguin RN  Outcome: Ongoing  Note: Pt is being turned every two hours and skin assessments are being done every 4 hours. Problem: Skin Integrity:  Goal: Will show no infection signs and symptoms  Description: Will show no infection signs and symptoms  10/4/2020 2242 by Booker Olguin RN  Outcome: Ongoing  Note: Pt is being turned every two hours and skin assessments are being done every 4 hours.       Problem: Skin Integrity:  Goal: Absence of new skin breakdown  Description: Absence of new skin breakdown  10/4/2020 2242 by Edmund Mar RN  Outcome: Ongoing  Note: No current sign of skin breakdown      Problem: Falls - Risk of:  Goal: Will remain free from falls  Description: Will remain free from falls  10/4/2020 2242 by Edmund Mar RN  Outcome: Ongoing  Note: Side rails x4 in place, bed alarm is activated, hourly rounding in effect, fall risk armband in place, pt. educated on importance of seeking assistance before ambulating. Problem: Falls - Risk of:  Goal: Absence of physical injury  Description: Absence of physical injury  10/4/2020 2242 by Edmund Mar RN  Outcome: Ongoing  Note: Side rails x4 in place, bed alarm is activated, hourly rounding in effect, fall risk armband in place, pt. educated on importance of seeking assistance before ambulating.

## 2020-10-06 NOTE — PLAN OF CARE
Problem: Impaired respiratory status  Goal: Will be able to breathe spontaneously, without ventilator support  Description: Will be able to breathe spontaneously, without ventilator support  10/6/2020 0757 by Domingo Snellen, RCP  Outcome: Met This Shift   Vent setting optimized to achieve target tidal volume, respiratory rate and ideal oxygen saturations. SBT will be performed when appropriate. The patient was able to tolerate SBT. RSBI  was 22 with EtCO2 of 50 and SpO2 of 92 on 30% FiO2. Spontanteous VT was 562 and RR 16 breaths/min.   The patient was on SBT for 122 minutes

## 2020-10-06 NOTE — PROGRESS NOTES
Patient:  Kai Aw    Unit/Bed:4D-03/003-A  MRN: [id number]   PCP: KRYSTAL Katz CNP  Date of Admission: 10/3/2020    Assessment and Plan(All pulmonary edema, renal failure, PE, and respiratory failure diagnoses are acute in nature unless otherwise specified):        1. Acute hypoxic respiratory failure: secondary to severe pneumonia, pulmonary edema. Intubated 10/3 after BiPAP failure. Extubated 10/5 but required re-intubation a few hours later after HF NC and BiPAP failure. Maintain PCV. Maintain lung-protective strategies, pPeak <35 and pPlateau <68.   2. Acute on Chronic systolic HFrEF, history of: EF on ECHO (10/5) showing drop to 25-30%, from last ECHO (2/2020) w/ EF 35-40%. Ischemic cardiomyopathy history. Last cardiac cath 1/2020 showed no significant CAD at that time. Follows w/ Dr. Guanakito Kendrick, but was lost to follow-up given concerns over social distancing during the COVID-19 pandemic. Troponin negative. TSH normal. S/p lasix 40 mg IV once 10/4. Pro-BNP normal on admission, 570.5 (10/4), now 846.6 (10/5). Pulmonary edema on CXR (10/4), slightly improved today (10/6) s/p diuresis initiated yesterday 10/5. I/O last 24h net -1.9L. Continue lasix 60 mg IV BID. Repeat CXR tomorrow AM. Normotensive, HR 83. Holding home losartan, thiazide for DAI w/ hyperkalemia. Continue metoprolol 50 mg BID. 3. Sepsis: met SIRS criteria, qSOFA= 1. Pocal 4.11, lactic acid 1.7 (10/4). Did not require pressors or fluid resuscitation. Normotensive. Blood cultures x2 preliminary no growth. Respiratory culture (10/3) w/ normal yony. Respiratory culture (10/5) preliminary normal yony. Molecular PNA panel (10/5) was positive for staph aureus. MRSA screen PCR negative. Patient also has E. Coli growth on urine cultures (10/3 and 10/4). Continue zosyn (10/4-present). 4. Severe pneumonia, bilateral: Diffuse bilateral disease noted on CTA (10/3) and CXR (10/4) concerning for PNA. Tmax last 24h 99.3. WBC 10.5.  Respiratory culture (10/3) w/ normal yony. Respiratory culture (10/5) preliminary normal yony. Molecular PNA panel (10/5) was positive for staph aureus. MRSA screen PCR negative. COVID-19 screen negative x2. Continue decadron 4 mg daily for 4 days (10/4-present). Continue zosyn (10/4-present). Follow up culture results. 5. Acute kidney injury w/ hyperkalemia: Improving. Cr 1.3 today 10/5, from peak of 1.6 on 10/4. Baseline 1.0-1. 18. S/p lasix 40 mg one time 10/4, w/ improvement of K to 4.1. Monitor BMP daily w/ scheduled lasix as above. Urine output adequate; continue to monitor I/O q8h. Continue IVF w/ 0.9% mL/h. FENa 1.5%; likely ATN from acute HFrEF exacerbation. 6. Complicated UTI: urine culture positive E. Coli (10/3 and 10/4). Continue zosyn as above for UTI and pneumonia coverage. Sensitive to zosyn. Continue zosyn as above. 7. Bilateral pleural effusions: Improving. Small, bilateral on CTA (10/3). Likely secondary to acute on chronic HFrEF. Repeat CXR today (10/6) showing small but persisting effusions. Continue lasix 60 mg IV BID. 8. DM type II: Uncontrolled. HgbA1C 8.7% (7/2020). Gluc 146-157 overnight. Continue nightly lantus 20 units, and q4h SSI lispro low-dose correction. Holding home metformin, victoza and actos. 9. COPD/Asthma, history of: Not in acute exacerbation. Continue symbicort, spiriva. No wheezing at this time on exam. Last PFTs (2/2020) showed FEV1 69%. 10. Sleep apnea, history of: Uses CPAP at home. Currently mechanically ventilated. Continue home modafinil. 11. HTN: /79 today. Continue home metoprolol BID. Holding home losartan, thiazide given DAI. 12. CAD, history of: Last cardiac cath 1/2020. Demonstrated no sgnificant CAD at that time. Denies chest pain. Troponin negative. Continue home aspirin 81 mg daily. BB as above. 13. Polycythemia, history of: Hgb 14.1 today. Monitor CBC daily. 15. Urinary retention, history of: Continue proscar. Holding home flomax.  Monroe catheter in place. Urine output adequate. 15. HLD, history of: Continue home lipitor. Last lipid panel (7/2020): total chol 199, HDL 36, LDL 96, Trig 504. 16. GERD, history of: Continue pepcid 20 mg BID. 17. Anxiety, depression:  Continue home buspar. CC:  Dyspnea  HPI: María Ross is a 76 y.o. male former-smoker (quit 1992) w/ a PMH of HTN, HLD, chronic systolic HFrEF (last EF 30-20% 2/2020), CAD, DM type II, asthma, COPD, sleep apnea (uses home CPAP), loss of vision (s/p right eye surgery), GERD, migraines, anxiety and depression. He presented to [facility] ED on 10/3/2020, w/ compaints of increasing dyspnea over several weeks, but acutely worsened the day of presentation. On arrival, SpO2 was 76% on room air. He was placed on NRB support, w/ improvement. He continued to be tachypneic and labored. BiPAP therapy was started w/ no improvement. He required subsequent intubation. He received 3x albuterol and 1x Duoneb treatments, 125 mg of solu-medrol IV, and lopressor 5 mg IV once. SBP was 200 on arrival. He dropped to SBP 90 after lopressor was given. He was admitted to ICU for further care.      CTA of the chest showed bilateral infiltrates w/ combination of consilidation w/ extensive groundglass appearance. COVID-19 screen was negative twice. He developed an DAI, and procal was elevated to 4. 11. Temp was 100. 6. Zosyn was initiated w/ IV decadron for severe pneumonia. Vanc was discontinued once MRSA screen was negative. Urine culture grew E.Coli.     On 10/5/20, he was successfully extubated and remained on 3L NC and unlabored for multiple hours. He then acutely deteriorated, becoming hypoxic and tachypneic. Albuterol, high flow NC, and BiPAP were given without improvement. Lasix 60 mg IV BID was initiated. However, he required re-intubation. ECHO later resulted showing drop of EF to 25-30% (10/5) from last EF of 35-40% (2/2020).  The patient's spouse did add that the patient was following at one time w/ Dr. Pennie Noonan as Ti:1 sec:      General:   Acute on chronically ill adult male. Appears stated age. HEENT:  normocephalic and atraumatic. No scleral icterus. PERR  Neck: supple. No Thyromegaly. Lungs: coarse to auscultation. Normal rate, pattern. Unlabored. No retractions  Cardiac: RRR. S1/S2. No murmur. No JVD. Abdomen: soft. Non-distended. Nontender. BS hypoactive but audible. Extremities:  No clubbing, cyanosis, or edema x4. Vasculature: capillary refill < 3 seconds. Palpable 2+ dorsalis pedis pulses. Skin:  warm and dry. Intact. No rash. Psych:  Sedated, briefly arouseable. Oriented x2. Affect appropriate  Lymph:  No supraclavicular adenopathy. Neurologic:  No focal deficit. No seizures. Follows commands. Moves x4 extremities spontaneously, equally, strong. Cough reflex intact. Data: (All radiographs, tracings, PFTs, and imaging are personally viewed and interpreted unless otherwise noted).  Telemetry: NSR. No ectopy. HR 83.   12-lead ECG (10/4): sinus tachycardia, ventricular rate 104. T-wave inversion in anterolateral leads, No ST elevation/depression.  Na 144, K 4.1, Cl 106, CO2 27, BUN 18, Cr 1.3, Mg 2.0, Ca 8.5, Phos 3.5, Gluc 146-157   ICa 1.08 (10/4)   WBC 10.5, Hgb 14.4, Hct 47.0, Plt 142   Lactic acid 1.7 (10/4), procal 4.11 (10/4)   Troponin 0.016 (10/4), Pro-.5 (10/4), TSH 0.656 (10/4)   Albumin 3.6, alk phos 129, ALT 29, AST 28, bili 0.9, total protein 6.9 (10/4)  · Blood culture 1 (10/4): preliminary no growth  · Blood culture 2 (10/4): preliminary no growth  · Respiratory culture (10/4): normal yony   · Respiratory culture (10/5): preliminary normal yony  · Molecular pneumonia panel (10/3): negative  · Molecular pneumonia panel (10/5): positive staph aureus  · UA (10/4): yellow, moderate blood, protein 300, positive nitrites, small leukocyte, 8-15 casts, WBC , many bacteria  · Urine culture (10/3): positive E. Coli, cfu >100,000  · Urine culture (10/4): positive E.  Coli, cfu <10,000  · Urine legionella (10/5): negative  · Urine strep pneumo antigen (10/5): negative  · MRSA screen (10/4): negative  · VRE screen (10/4): negative  · COVID-19 screen (10/4): negative  · CTA w/ contrast (10/3) report: The distal tip of the endotracheal tube is 3.3 cm above the level of the ila. The esophageal tube extends to the stomach. There is a right jugular central venous catheter with the distal tip within the superior vena cava. There are small bilateral pleural effusions, right greater than left. There is infiltrate throughout both lung fields with additional consolidation within the mid and lower chest. Clinical management is recommended. Follow-up chest radiographs are also recommended to confirm complete resolution. There are mediastinal and hilar lymph nodes which may be reactive related to the inflammatory process. A follow-up CT examination of the chest with intravenous contrast in 3 months is recommended to confirm stability/resolution. There is no pulmonary embolus. · CXR (10/4) report: Tubes and line are as described above. Bilateral diffuse airspace disease appears similar to the prior study and may represent pulmonary edema versus atypical pneumonia. · ECHO (10/5) report: limited echo. Ejection fraction is visually estimated at 25-30%. There was severe global hypokinesis of the left ventricle. Akinetic motion of the mid anteroseptal wall noted in the left ventricle. The aortic valve leaflets were not well visualized. Aortic valve appears tricuspid. Aortic valve leaflets are somewhat thickened. · CXR (10/6) report: Similar bilateral airspace disease. Possible left pleural effusion. Support devices as above. Case discussed w/ Dr. Mira Hardy. Electronically signed by HOSEA Reid                                     Patient seen by me. Case discussed with nurse practitioner. Patient failed trial of extubation on 10/5/2020.   Patient was reintubated after maintaining off mechanical ventilator for several hours. Patient started to fatigue and developed hypoxemia. Patient reintubated 10/5/2020. Echocardiogram shows ejection fraction of 25%. Continue with aggressive diuresis. Patient also growing staphylococcal species from sputum. This appears to be methicillin sensitive staph aureus. Patient on Zosyn. This should cover the E. coli in addition to the Staphylococcus aureus. Patient unable to wean from mechanical ventilator secondary to severity of illness. CC time 30 minutes. Time was discontiguous. Time does not include nurse practitioner assessment. Time does include my direct assessment and coordination of care. Electronically signed by Danis Zapata MD.

## 2020-10-06 NOTE — PLAN OF CARE
Problem: Restraint Use - Nonviolent/Non-Self-Destructive Behavior:  Goal: Absence of restraint indications  Description: Absence of restraint indications  10/5/2020 2118 by Suleman Pelaez RN  Outcome: Ongoing  Note: Pt continues to require restraints due to intermittently pulling at lines and tubes. Problem: Restraint Use - Nonviolent/Non-Self-Destructive Behavior:  Goal: Absence of restraint-related injury  Description: Absence of restraint-related injury  10/5/2020 2118 by Suleman Pelaez RN  Outcome: Ongoing  Note: No evidence of restraint related injury this far in shift. Problem: Urinary Elimination:  Goal: Signs and symptoms of infection will decrease  Description: Signs and symptoms of infection will decrease  10/5/2020 2118 by Suleman Pelaez RN  Outcome: Ongoing  Note: Pt continues to require miller catheter. Pt intubated and sedated. Will continue to assess for need. Problem: Infection - Central Venous Catheter-Associated Bloodstream Infection:  Goal: Will show no infection signs and symptoms  Description: Will show no infection signs and symptoms  10/5/2020 2118 by Suleman Pelaez RN  Outcome: Ongoing  Note: No evidence of CVC related infection this far in shift. Problem: Skin Integrity:  Goal: Will show no infection signs and symptoms  Description: Will show no infection signs and symptoms  10/5/2020 2118 by Suleman Pelaez RN  Outcome: Ongoing  Note: No evidence of CVC related infection this far in shift. Problem: Skin Integrity:  Goal: Absence of new skin breakdown  Description: Absence of new skin breakdown  10/5/2020 2118 by Suleman Pelaez RN  Outcome: Ongoing  Note: No evidence of skin breakdown this far in shift. Pt turned and repositioned q 2 hr and prn. Bilateral arms and heels elevated on pillows.       Problem: Falls - Risk of:  Goal: Will remain free from falls  Description: Will remain free from falls  10/5/2020 2118 by Suleman Pelaez RN  Outcome: Ongoing  Note: Pt remains free from falls this far in shift. Bed in lowest position with bed alarm on. Pt sedated on vent. Problem: Falls - Risk of:  Goal: Absence of physical injury  Description: Absence of physical injury  10/5/2020 2118 by Vidya Gaytan RN  Outcome: Ongoing  Note: No evidence of physical injury this far in shift. Problem: Nutrition  Goal: Optimal nutrition therapy  10/5/2020 2118 by Vidya Gaytan RN  Outcome: Ongoing  Note: Pt tolerating continuous tube feed this far in shift. Pt unable to participate in care planning due to being intubated and sedated. Pt family actively participates in care planning and goal setting.

## 2020-10-06 NOTE — CARE COORDINATION
10/6/20, 1:38 PM EDT    DISCHARGE ON GOING [address] day: 3  Location: -03/003-A Reason for admit: Respiratory failure [facility]) [J96.90]   Procedure:   10/3 Intubated  10/3 CVC right subclavian  10/3 CTA Chest: There are small bilateral pleural effusions, right greater than left. There is infiltrate throughout both lung fields with additional consolidation within the mid and lower chest; There are mediastinal and hilar lymph nodes which may be reactive related to the inflammatory process; neg for PE  10/5 Extubated/Reintubated  10/6 CXR:   Similar bilateral airspace disease. Possible left pleural effusion. Support devices as above     Treatment Plan of Care: Extubated yesterday morning to NC, then placed on vapotherm. Respiratory distress. Placed on bipap and given nebs and lasix. Required reintubation yesterday. Remains on vent w/ETT on SBT, FIO2 40%, sats 97%. No plans for extubation today. Tmax 99.7. NSR. Follow commands. Dietitian consulted. PT/OT. Respiratory culture +Staph Aureus by PCR. Urine +EColi. COVID 19 was negative. Telemetry, I&O, daily weight, oral care, OG w/TF, miller care. Precedex @ 1 mcg/kg/hr, diprivan @ 30 mcg/kg/min, asa, lipitor, baclofen, inhaler, buspar, IV decadron 4 mg daily, lovenox, pepcid, IV lasix 60 mg bid, proscar, lantus, SSI Q4H, lopressor, provigil, IV zosyn, lyrica. Creat 1.3,   Barriers to Discharge: on vent  PCP: KRYSTAL Ravi CNP  Readmission Risk Score: 21%  Patient Goals/Plan/Treatment Preferences: From home alone. Has cpap and nebs. Plan pending clinical course.

## 2020-10-06 NOTE — PROGRESS NOTES
Physician Progress Note      Kiley Galicia  CSN #:                  [id number]  :                       1955  ADMIT DATE:       10/3/2020 5:45 PM  [address] [place] [place] DATE:  RESPONDING  PROVIDER #:        Celi Clark CNP          QUERY TEXT:    Dr Asif Lucas,    Pt admitted with PNA. Noted documentation of SIRS by ED Physician on 10/3. If possible, please document in progress notes and discharge summary:    The medical record reflects the following:  Risk Factors: DM2, HTN, CHF, JOSE A  Clinical Indicators: PNA, Cystitis, pulse 147, Procalcitonin 0.15, WBC 12.7,  Treatment: IV Zosyn, PNA, Intubation  Options provided:  -- SIRS confirmed POA  -- Sepsis confirmed POA  -- SIRS/ Sepsis ruled out  -- Other - I will add my own diagnosis  -- Disagree - Not applicable / Not valid  -- Disagree - Clinically unable to determine / Unknown  -- Refer to Clinical Documentation Reviewer    PROVIDER RESPONSE TEXT:    See my progress noted 10/6/2020 for details.     Query created by: Arnel Meyers on 10/5/2020 8:04 AM      Electronically signed by:  Celi Clark CNP 10/6/2020 7:49 AM

## 2020-10-06 NOTE — PROGRESS NOTES
Comprehensive Nutrition Assessment    Type and Reason for Visit:  Reassess, Consult(TF)    Nutrition Recommendations/Plan:   Plan restart Vital 1.2 TF at 20 ml/hr & increase to goal of 50 ml/hr as tolerated with current diprivan rate. Discussed with Tanisha Rivers. Free H20 flush per Dr. Monroe Busby ability for bms. Nutrition Assessment:    Pt. nutritionally compromised AEB NPO, TF held at this time & plan restart as pt tolerates. At risk for further nutrition compromise r/t intubated 10/5 & reintubated 10/5 , pneumonia, CHF and underlying medical condition (COPD, DM, diarrhea, depression, HTN ). Nutrition recommendations/interventions as per above    Malnutrition Assessment:  Malnutrition Status: At risk for malnutrition (Comment)    Context:  Acute Illness     Findings of the 6 clinical characteristics of malnutrition:  Energy Intake:  Unable to assess  Weight Loss:  No significant weight loss     Body Fat Loss:  No significant body fat loss     Muscle Mass Loss:  No significant muscle mass loss    Fluid Accumulation:  1 - Mild     Strength:  Not Performed    Estimated Daily Nutrient Needs:  Energy (kcal):  ~1881-0196 kcals ( 15-18 kcals/kg); Weight Used for Energy Requirements:  Admission(10/3 106 kg)     Protein (g):  ~88 grams ( 1.3 grams/kg) monitor renal status; Weight Used for Protein Requirements:  Ideal(67.2 kg)        Fluid (ml/day):  .;   (per Dr)      Nutrition Related Findings:  Pt extubated yesterday & reintubated yesterday sedated with precedex & diprivan & at current rate providing 501 kcals in lipid emulsion. Vital 1.2 TF started yesterday afternoon at 20 ml/hr & per Larissa Lai RN did hold today as residuals over 200 ml, but had to give several meds this morning. Per Larissa Lai RN plan to restart as able & also gave ducolax,  No bms so far this admit.  Glucose 157, BUN 18, Cr 1.3 . meds also include lasix, steroid, humalog, lantus , Cervavite     Wounds:  None(per RN)       Current Nutrition Therapies: Current Tube Feeding (TF) Orders:  · Feeding Route: Orogastric  · Formula: Semi-Elemental(Vital 1.2 ( also low CHO TF))  · Schedule: Continuous(plan restart at 20 ml/hr & goal is 50 ml/hr as tolerated)  · Additives/Modulars: (.)  · Water Flushes: per Dr  · Goal TF & Flush Orders Provides: Vital 1.2 at goal of 50 ml/hr= 1440 kcals TF ( 1941 kcals with diprivan ), 90 grams protein & 133 grams CHO in 1200 ml TF/24 hours      Anthropometric Measures:  · Height: 5' 7\" (170.2 cm)  · Current Body Weight: (10/6 +1 edema)   · Admission Body Weight: 233 lb 11 oz (106 kg)(10/3 +1 edema)    · Usual Body Weight: 229 lb (103.9 kg)(7/1/20)     · Ideal Body Weight: 148 lbs;    · BMI: 36.6  · BMI Categories: Obese Class 2 (BMI 35.0 -39.9)       Nutrition Diagnosis:   · Inadequate oral intake related to impaired respiratory function as evidenced by intubation(NPO)      Nutrition Interventions:   Food and/or Nutrient Delivery:  Continue Current Tube Feeding  Nutrition Education/Counseling:  No recommendation at this time   Coordination of Nutrition Care:  Continued Inpatient Monitoring, Interdisciplinary Rounds    Goals:  TF to provide % of nutrient needs while pt is intubated       Nutrition Monitoring and Evaluation:   Behavioral-Environmental Outcomes:      Food/Nutrient Intake Outcomes:  Enteral Nutrition Intake/Tolerance, Diet Advancement/Tolerance  Physical Signs/Symptoms Outcomes:  Biochemical Data, Chewing or Swallowing, GI Status, Fluid Status or Edema, Nutrition Focused Physical Findings, Skin, Weight     Discharge Planning:     Too soon to determine     Electronically signed by Bonnie Rajan RD, LD on 10/6/20 at 10:47 AM EDT    Contact: [phone]

## 2020-10-06 NOTE — PROGRESS NOTES
[facility]  PHYSICAL THERAPY MISSED TREATMENT NOTE  ACUTE CARE  [facility] ICU 4D              Missed Treatment  Pt intubated this am and had plans to extubate later. See tomorrow.

## 2020-10-06 NOTE — PLAN OF CARE
Problem: Nutrition  Goal: Optimal nutrition therapy  10/6/2020 1046 by Trell Borges RD, LD  Outcome: Ongoing  Nutrition Problem #1: Inadequate oral intake  Intervention: Food and/or Nutrient Delivery: Continue Current Tube Feeding  Nutritional Goals: TF to provide % of nutrient needs while pt is intubated

## 2020-10-06 NOTE — PLAN OF CARE
Problem: Impaired respiratory status  Goal: Will be able to breathe spontaneously, without ventilator support  10/6/2020 0028 by Mell Souza RCP  Outcome: Ongoing  Note: Vent settings optimized to achieve target tidal volume, respiratory rate and ideal oxygen saturations. Will continue to wean as patient tolerates. SBT will be performed when appropriate.

## 2020-10-07 NOTE — PLAN OF CARE
Problem: Impaired respiratory status  Goal: Will be able to breathe spontaneously, without ventilator support  Description: Will be able to breathe spontaneously, without ventilator support  10/7/2020 1549 by India Luther RCP  Outcome: Ongoing  Note: Vent setting optimized to achieve target tidal volume, respiratory rate and ideal oxygen saturations. SBT will be performed when appropriate.

## 2020-10-07 NOTE — PROGRESS NOTES
[address] [place] [address]  PHYSICAL THERAPY MISSED TREATMENT NOTE  ACUTE CARE  [facility] ICU 4D              Missed Treatment     Hold Treatment per Nursing  Pt is sedated at this time and on a vent.   Will retry tomorrow.

## 2020-10-07 NOTE — PROGRESS NOTES
Wife at bedside- updated on condition and CXR results. Pt with eyes open and does focus. Pt will follow commands but weak x4. [id number]- Tubefeeds decreased to 35 ml/hr as per dietary recs. Gabriel Alexander with sister Marni Kaba, updated on condition. [address] CNP updated wife on pt condition and POC- questions answered.

## 2020-10-07 NOTE — PLAN OF CARE
Problem: Impaired respiratory status  Goal: Will be able to breathe spontaneously, without ventilator support  Description: Will be able to breathe spontaneously, without ventilator support  10/7/2020 0937 by Salima Mullen RCP  Outcome: Ongoing  Note: Vent setting optimized to achieve target tidal volume, respiratory rate and ideal oxygen saturations.  The patient was able to tolerate SBT. RSBI  was 47 with EtCO2 of 50 and SpO2 of 99 on 35% FiO2. Spontanteous VT was 464 and RR 22 breaths/min.

## 2020-10-07 NOTE — PLAN OF CARE
[address] THERAPY MISSED TREATMENT NOTE  [facility] ICU 4D  4D-03003-A      Date: 10/7/2020  Patient Name: Yani [place]chester        CSN: [id number]   : 1955  (76 y.o.)  Gender: male   Referring Practitioner: Dr. Alix Quinones MD  Diagnosis: Respiratory Failure         REASON FOR MISSED TREATMENT: Hold Treatment per Nursing  Pt is sedated at this time and on a vent. Will retry tomorrow.

## 2020-10-07 NOTE — PROGRESS NOTES
Comprehensive Nutrition Assessment    Type and Reason for Visit:  Reassess(TF. Per RN diprivan increased alot)    Nutrition Recommendations/Plan:  Plan Vital 1.2 ( lowest CHO) TF at goal of 35 ml/hr & flush 1 ( 2.5 oz) liquid protein daily with current diprivan rate. Free H20 flush per Dr. Kendell Pierre med adjustment per     Nutrition Assessment:    Pt. nutritionally improving somewhat  AEB TF started & pt tolerating.  At risk for further nutrition compromise r/t intubated 10/5 & reintubated 10/5 , pneumonia, CHF and underlying medical condition (COPD, DM, diarrhea, depression, HTN ).  Nutrition recommendations/interventions as per above    Malnutrition Assessment:  Malnutrition Status: At risk for malnutrition (Comment)    Context:  Acute Illness     Findings of the 6 clinical characteristics of malnutrition:  Energy Intake:  Unable to assess  Weight Loss:  No significant weight loss     Body Fat Loss:  No significant body fat loss     Muscle Mass Loss:  No significant muscle mass loss    Fluid Accumulation:  1 - Mild     Strength:  Not Performed    Estimated Daily Nutrient Needs:  Energy (kcal):  ~0789-6021 kcals ( 15-18 kcals/kg); Weight Used for Energy Requirements:  Admission(10/3 106 kg)     Protein (g):  ~88 grams ( 1.3 grams/kg) monitor renal status; Weight Used for Protein Requirements:  Ideal(67.2 kg)        Fluid (ml/day):  .; Weight Used for Fluid Requirements:  (per Dr)      Nutrition Related Findings:  Pt continues intubated sedated with diprivan & per RN the rate has increased a lot. Vital 1.2 TF is lowest CHO TF & infusing at 50 ml/hr. Wth diprivan increase, can decrease TF to 35 ml/hr & flush 1 protein. Per Renata Clark RN pt started mucus stools. Glucose 329 meds iinclude lasix, humalog, lantus , MVI & bm meds.   MAP 91      Wounds:  None(per RN)       Current Nutrition Therapies:    Current Tube Feeding (TF) Orders:  · Feeding Route: Orogastric  · Formula: Semi-Elemental(Vital 1.2 ( also low CHO TF))  · Schedule: Continuous(goal 35 ml/hr)  · Additives/Modulars: (1( 2.5 oz) liquid protein daily)  · Water Flushes: per Dr  · Goal TF & Flush Orders Provides: Vital 1.2 at goal of 35 ml/hr & 1 protein modular providing 1114 kcals TF ( 1953 kcals with diprivan), 89 grams protein, 93 grams CHO in 840 ml/24 hours      Anthropometric Measures:  · Height: 5' 7\" (170.2 cm)  · Current Body Weight: 221 lb 1.9 oz (100.3 kg)(10/7 +1 edema)   · Admission Body Weight: 233 lb 11 oz (106 kg)(10/3 +1 edema)    · Usual Body Weight: 229 lb (103.9 kg)(7/1/20)     · Ideal Body Weight: 148 lbs;      · BMI: 34.6  · BMI Categories: Obese Class 1 (BMI 30.0-34. 9)       Nutrition Diagnosis:   · Inadequate oral intake related to impaired respiratory function as evidenced by intubation(NPO)      Nutrition Interventions:   Food and/or Nutrient Delivery:  Modify Tube Feeding  Nutrition Education/Counseling:  No recommendation at this time   Coordination of Nutrition Care:  Continued Inpatient Monitoring, Interdisciplinary Rounds    Goals:  TF to provide % of nutrient needs while pt is intubated       Nutrition Monitoring and Evaluation:       Food/Nutrient Intake Outcomes:  Enteral Nutrition Intake/Tolerance, Diet Advancement/Tolerance  Physical Signs/Symptoms Outcomes:  Biochemical Data, Chewing or Swallowing, GI Status, Fluid Status or Edema, Nutrition Focused Physical Findings, Skin, Weight     Discharge Planning:     Too soon to determine     Electronically signed by Junior Simons RD, LD on 10/7/20 at 3:10 PM EDT    Contact: [phone]

## 2020-10-07 NOTE — PLAN OF CARE
Problem: Impaired respiratory status  Goal: Will be able to breathe spontaneously, without ventilator support  Description: Will be able to breathe spontaneously, without ventilator support  10/6/2020 2122 by Laura Bland RN  Outcome: Ongoing  10/6/2020 1953 by Rupesh Granado RCP  Outcome: Ongoing  Note: Patient remains on SPONT mode PS 8 PEEP 6 and 30%, tolerating well. Will continue to monitor. 10/6/2020 0757 by Bj Llamas RCP  Outcome: Met This Shift     Problem: Restraint Use - Nonviolent/Non-Self-Destructive Behavior:  Goal: Absence of restraint indications  Description: Absence of restraint indications  Outcome: Ongoing  Note: Patient still needed Restraints, patietn still pulling at tubes and lines     Problem: Urinary Elimination:  Goal: Signs and symptoms of infection will decrease  Description: Signs and symptoms of infection will decrease  Outcome: Ongoing  Note: No signs and symptoms of infection this shift. Goal: Complications related to the disease process, condition or treatment will be avoided or minimized  Description: Complications related to the disease process, condition or treatment will be avoided or minimized  Outcome: Ongoing     Problem: Infection - Central Venous Catheter-Associated Bloodstream Infection:  Goal: Will show no infection signs and symptoms  Description: Will show no infection signs and symptoms  Outcome: Ongoing  Note: No signs of new skin breakdown noted with each assessment this shift. Skin warm, dry, and intact except where otherwise noted in head-to-toe assessment. Mucous membranes pink and moist. Assistance with turns/ambulation given as needed. Problem: Skin Integrity:  Goal: Will show no infection signs and symptoms  Description: Will show no infection signs and symptoms  Outcome: Ongoing  Note: No signs of new skin breakdown noted with each assessment this shift. Skin warm, dry, and intact except where otherwise noted in head-to-toe assessment. Mucous membranes pink and moist. Assistance with turns/ambulation given as needed. Goal: Absence of new skin breakdown  Description: Absence of new skin breakdown  Outcome: Ongoing     Problem: Falls - Risk of:  Goal: Will remain free from falls  Description: Will remain free from falls  Outcome: Ongoing  Note: Patient remained free from falls this shift. Bed is in low position with alarm on and siderails up x2. Call light and beside table within reach. Arm band and falling star in place. Goal: Absence of physical injury  Description: Absence of physical injury  Outcome: Ongoing     Problem: Nutrition  Goal: Optimal nutrition therapy  10/6/2020 2122 by Rachel Shankar RN  Outcome: Ongoing  Note: Patient currently on Tube feed running at goal of 50  10/6/2020 1046 by Russ Gillette RD, LD  Outcome: Ongoing   Care plan reviewed with patient. Patient verbalized and nodded understanding of the plan of care and contribute to goal setting.

## 2020-10-07 NOTE — PROGRESS NOTES
Patient:  Moo Case    Unit/Bed:4D-03/003-A  MRN: [id number]   PCP: KRYSTAL Montesinos CNP  Date of Admission: 10/3/2020    Assessment and Plan(All pulmonary edema, renal failure, PE, and respiratory failure diagnoses are acute in nature unless otherwise specified):        1. Acute hypoxic respiratory failure: secondary to severe pneumonia, pulmonary edema. Intubated 10/3 after BiPAP failure. Extubated 10/5 but required re-intubation a few hours later after HF NC and BiPAP failure. Maintain PCV. Maintain lung-protective strategies, pPeak <35 and pPlateau <25.   2. Acute on Chronic systolic HFrEF, history of: EF on ECHO (10/5) showing drop to 25-30%, from last ECHO (2/2020) w/ EF 35-40%. Ischemic cardiomyopathy history. Last cardiac cath 1/2020 showed no significant CAD at that time. Follows w/ Dr. Shereen Villela, but was lost to follow-up given concerns over social distancing during the COVID-19 pandemic. Troponin negative. TSH normal. S/p lasix 40 mg IV once 10/4. Pro-BNP normal on admission, 570.5 (10/4), now 846.6 (10/5). Pulmonary edema on CXR (10/4), more improved today (10/7) s/p diuresis initiated 10/5. I/O last 24h net -1.3L. Continue lasix 60 mg IV BID. Repeat CXR tomorrow AM. Normotensive, HR 84. Holding home losartan, thiazide for DAI w/ hyperkalemia. Continue metoprolol 50 mg BID.   3. Sepsis: SIRS, qSOFA= 1. Pocal 4.11, lactic acid 1.7 (10/4). Did not require pressors or fluid resuscitation. Normotensive. Blood cultures x2 preliminary no growth. Respiratory culture (10/3) normal yony. Respiratory culture (10/5) preliminary normal yony. Molecular PNA panel (10/5) positive staph aureus. MRSA screen PCR negative. Patient also has E. Coli growth on urine cultures (10/3 and 10/4). Continue zosyn (10/4-present). 4. Severe pneumonia, bilateral: Diffuse bilateral disease noted on CTA (10/3) and CXR (10/4) concerning for PNA. Tmax last 24h 100.6. WBC 9.8. Respiratory culture (10/3) w/ normal yony. on CTA (10/3). Likely secondary to acute on chronic HFrEF. Repeat CXR today (10/6) showing small but persisting effusions. Continue lasix 60 mg IV BID. CC:  Dyspnea  HPI: Baldev Vizcaino is a 76 y.o. male former-smoker (quit 1992) w/ a PMH of HTN, HLD, chronic systolic HFrEF (last EF 29-82% 2/2020), CAD, DM type II, asthma, COPD, sleep apnea (uses home CPAP), loss of vision (s/p right eye surgery), GERD, migraines, anxiety and depression.     He presented to [facility] ED on 10/3/2020, w/ compaints of increasing dyspnea over several weeks, but acutely worsened the day of presentation. On arrival, SpO2 was 76% on room air. He was placed on NRB support, w/ improvement. He continued to be tachypneic and labored. BiPAP therapy was started w/ no improvement. He required subsequent intubation. He received 3x albuterol and 1x Duoneb treatments, 125 mg of solu-medrol IV, and lopressor 5 mg IV once. SBP was 200 on arrival. He dropped to SBP 90 after lopressor was given. He was admitted to ICU for further care.      CTA of the chest showed bilateral infiltrates w/ combination of consilidation w/ extensive groundglass appearance. COVID-19 screen was negative twice. He developed an DAI, and procal was elevated to 4. 11. Temp was 100. 6. Zosyn was initiated w/ IV decadron for severe pneumonia. Vanc was discontinued once MRSA screen was negative. Urine culture grew E.Coli.     On 10/5/20, he was successfully extubated and remained on 3L NC and unlabored for multiple hours. He then acutely deteriorated, becoming hypoxic and tachypneic. Albuterol, high flow NC, and BiPAP were given without improvement. Lasix 60 mg IV BID was initiated. However, he required re-intubation. ECHO later resulted showing drop of EF to 25-30% (10/5) from last EF of 35-40% (2/2020).  The patient's spouse did add that the patient was following at one time w/ Dr. Jolene Venegas as his cardiologist, but he \"hasn't followed-up for a while\" given social distancing concerns related to the COVID-19 pandemic. Molecular pneumonia panel positive for staph aureus, not MRSA. Continuing w/ zosyn. ROS: Unable to obtain ros at this time given patient is heavily sedated and mechanically ventilated. PMH:  Per HPI  SHX:  Former-smoker (15 pack-years; quit 1992). Endorses occasional alcohol use. Denies substance use. FHX: Mother- No known. Father- no known. Allergies: NKDA  Medications:     sodium chloride      propofol 50 mcg/kg/min (10/07/20 1539)    fentaNYL (SUBLIMAZE) 500 mcg in sodium chloride 0.9 % 100 mL Stopped (10/06/20 0617)    dexmedetomidine 1.2 mcg/kg/hr (10/07/20 1636)    dextrose        insulin lispro  0-18 Units Subcutaneous Q4H    multivitamin+  30 mL Oral Daily    insulin glargine  45 Units Subcutaneous Nightly    bisacodyl  10 mg Rectal Daily    famotidine  20 mg Oral BID    furosemide  60 mg Intravenous BID    ketamine  100 mg Intravenous Once    propofol  50 mg Intravenous Once    piperacillin-tazobactam  3.375 g Intravenous Q8H    aspirin  81 mg Oral Daily    atorvastatin  10 mg Oral TID    baclofen  10 mg Oral Daily    pregabalin  75 mg Oral BID    tiotropium  2 puff Inhalation Daily    sodium chloride flush  10 mL Intravenous 2 times per day    enoxaparin  40 mg Subcutaneous Daily    [Held by provider] losartan  50 mg Oral Daily    And    [Held by provider] hydroCHLOROthiazide  12.5 mg Oral Daily    metoprolol  50 mg Oral BID    busPIRone  5 mg Oral TID    finasteride  5 mg Oral Daily    modafinil  100 mg Oral Daily    budesonide-formoterol  2 puff Inhalation BID       Vital Signs:   T: 100.6: P: 84 RR: 20 B/P: 128/77: FiO2: 35%: O2 Sat:97%: I/O: 2847/4240 (-1392) GCS: 9  Body mass index is 34.63 kg/m². Maximus Hilt PC: 14/6: TV: 482: RRTotal: 20: Ti:1 sec:   General:   Acute on chronically ill adult male. Appears stated age. HEENT:  normocephalic and atraumatic. No scleral icterus. PERR  Neck: supple. No Thyromegaly.   Lungs: clear to auscultation. Normal rate, pattern. Unlabored. No retractions  Cardiac: RRR. S1/S2. No murmur. No JVD. Abdomen: soft. Mildly distended. Nontender. BS active x4 quadrants. Extremities:  No clubbing, cyanosis, or edema x 4. Vasculature: capillary refill < 3 seconds. Palpable 2+ dorsalis pedis pulses. Skin:  warm and dry. Intact. No rash. Psych:  Sedated on propofol, precedex. RASS -2. Affect appropriate. Lymph:  No supraclavicular adenopathy. Neurologic:  No focal deficit. No seizures. Follows commands when fully awakened. Moves x4 extremities w/ equal moderate strength. Cough, corneal, gag intact. Data: (All radiographs, tracings, PFTs, and imaging are personally viewed and interpreted unless otherwise noted).  Telemetry: NSR. No ectopy. HR 84.   12-lead ECG (10/4): sinus tachycardia, ventricular rate 104. T-wave inversion in anterolateral leads, No ST elevation/depression.  Na 144, K 3.6, Cl 105, CO2 26, BUN 21, Cr 1.3, Mg 2.1, Ca 9.0, Gluc 215-324   WBC 9.8, Hgb 15.5, Hct 50.4, Plt 150  · ICa 1.08 (10/4)  · Lactic acid 1.7 (10/4), procal 4.11 (10/4)  · Troponin 0.016 (10/4), Pro-.5 (10/4), TSH 0.656 (10/4)  · Albumin 3.6, alk phos 129, ALT 29, AST 28, bili 0.9, total protein 6.9 (10/4)  · Blood culture 1 (10/4): preliminary no growth  · Blood culture 2 (10/4): preliminary no growth  · Respiratory culture (10/4): normal yony   · Respiratory culture (10/5): preliminary normal yony  · Molecular pneumonia panel (10/3): negative  · Molecular pneumonia panel (10/5): positive staph aureus  · UA (10/4): yellow, moderate blood, protein 300, positive nitrites, small leukocyte, 8-15 casts, WBC , many bacteria  · Urine culture (10/3): positive E. Coli, cfu >100,000  · Urine culture (10/4): positive E.  Coli, cfu <10,000  · Urine legionella (10/5): negative  · Urine strep pneumo antigen (10/5): negative  · MRSA screen (10/4): negative  · VRE screen (10/4): negative  · COVID-19 screen (10/4): negative  · CTA w/ contrast (10/3) report: The distal tip of the endotracheal tube is 3.3 cm above the level of the ila. The esophageal tube extends to the stomach. There is a right jugular central venous catheter with the distal tip within the superior vena cava. There are small bilateral pleural effusions, right greater than left. There is infiltrate throughout both lung fields with additional consolidation within the mid and lower chest. Clinical management is recommended. Follow-up chest radiographs are also recommended to confirm complete resolution. There are mediastinal and hilar lymph nodes which may be reactive related to the inflammatory process. A follow-up CT examination of the chest with intravenous contrast in 3 months is recommended to confirm stability/resolution. There is no pulmonary embolus. · CXR (10/4) report: Tubes and line are as described above. Bilateral diffuse airspace disease appears similar to the prior study and may represent pulmonary edema versus atypical pneumonia. · CXR (10/6) report: Similar bilateral airspace disease. Possible left pleural effusion. Support devices as above. · ECHO (10/5) report: limited echo. Ejection fraction is visually estimated at 25-30%. There was severe global hypokinesis of the left ventricle. Akinetic motion of the mid anteroseptal wall noted in the left ventricle. The aortic valve leaflets were not well visualized. Aortic valve appears tricuspid. Aortic valve leaflets are somewhat thickened. · CXR (10/7) report: Significant improvement in bilateral consolidations. Case discussed w/ Dr. Bry Rojo. Electronically signed by HOSEA Parmar                                     Patient seen by me. Discussed with nurse practitioner. Patient still on mechanical ventilator. Continue with aggressive diuresis. Likely can wean tomorrow. Ejection fraction only 25%. On Zosyn for urinary tract infection and for MSSA found in pulmonary lavage.   CC time 35 minutes. Time does not include procedures. Time does not include nurse practitioner assessment. Time does include by direct assessment and coordination of care. Electronically signed by Regina Curran MD.

## 2020-10-07 NOTE — CARE COORDINATION
10/7/20, 1:55 PM EDT    DISCHARGE ON [address] day: 4  Location: -03/003-A Reason for admit: Respiratory failure [facility]) [J96.90]   Procedure:   10/3 Intubated  10/3 CVC right subclavian  10/3 CTA Chest: There are small bilateral pleural effusions, right greater than left. There is infiltrate throughout both lung fields with additional consolidation within the mid and lower chest; There are mediastinal and hilar lymph nodes which may be reactive related to the inflammatory process; neg for PE  10/5 Extubated/Reintubated  10/7 CXR: Significant improvement in bilateral consolidations  Treatment Plan of Care: Remains on vent w/ETT on SBT, FIO2 35%, sats 97%. No plans for extubation today. Tmax 100.6. NSR. Follow commands. Dietitian consulted. PT/OT. Respiratory culture +Staph Aureus by PCR. Urine +EColi. COVID 19 was negative. Telemetry, I&O, daily weight, oral care, OG w/TF, miller care. Precedex @ 1.2 mcg/kg/hr, diprivan @ 50 mcg/kg/min, asa, lipitor, baclofen, inhaler, buspar, lovenox, pepcid, IV lasix 60 mg bid, proscar, lantus, SSI Q4H, lopressor, provigil, IV zosyn, lyrica. Creat 1.3. Barriers to Discharge: on vent  PCP: KRYSTAL Wiley CNP  Readmission Risk Score: 20%  Patient Goals/Plan/Treatment Preferences: From home alone. Has cpap and nebs. Plan pending clinical course.

## 2020-10-08 NOTE — PLAN OF CARE
Problem: Impaired respiratory status  Goal: Will be able to breathe spontaneously, without ventilator support  Description: Will be able to breathe spontaneously, without ventilator support  10/8/2020 0747 by Vasu Garza RCP  Outcome: Met This Shift  Pt extubated on night shift and currently is on 2 lpm/nc with SPO2 95%. Pt started on mdi's this morning.

## 2020-10-08 NOTE — PLAN OF CARE
[facility] THERAPY MISSED TREATMENT NOTE  [facility] ICU 4D  4D-003-A      Date: 10/8/2020  Patient Name: Roberto Carlos Delgado        CSN: [id number]   : 1955  (76 y.o.)  Gender: male   Referring Practitioner: Dr. Lauri Ash MD  Diagnosis: Respiratory Failure         REASON FOR MISSED TREATMENT: pt on hold this am with [place] RN stating to check back this pm. This pm, per PT pt with increased BP when they attempting to see pt with RN requesting to return pt back to bed. Will check back on 10/9/20.

## 2020-10-08 NOTE — PROGRESS NOTES
Patient:  Tommie Cleary    Unit/Bed:4D-03/003-A  MRN: [id number]   PCP: KRYSTAL Wylie CNP  Date of Admission: 10/3/2020    Assessment and Plan(All pulmonary edema, renal failure, PE, and respiratory failure diagnoses are acute in nature unless otherwise specified):        1. Acute on Chronic systolic HFrEF, history of: EF on ECHO (10/5) showing drop to 25-30%, from last ECHO (2/2020) w/ EF 35-40%. Ischemic cardiomyopathy history. Last cardiac cath 1/2020 showed no significant CAD at that time. Follows w/ Dr. Natalia Parra, but was lost to follow-up given concerns over social distancing during the COVID-19 pandemic. Troponin negative. TSH normal. S/p lasix 40 mg IV once 10/4. Pro-BNP normal on admission, 570.5 (10/4), 846.6 (10/5).  Pulmonary edema on CXR (10/4), significantly improved now s/p diuresis initiated 10/5. I/O last 24h net +1.0L. Continue lasix 60 mg IV BID. Hypertensive now, HR 86. Holding home losartan, thiazide for DAI w/ hyperkalemia. Increase metoprolol to 75 mg BID.   2. Sepsis: SIRS, qSOFA= 1. Pocal 4.11, lactic acid 1.7 (10/4). Did not require pressors or fluid resuscitation. Normotensive. Blood cultures x2 preliminary no growth. Respiratory culture (10/3) normal yony. Respiratory culture (10/5) staph coagulase positive. Molecular PNA panel (10/5) positive staph aureus. MRSA screen PCR negative. Patient also has E. Coli growth on urine cultures (10/3 and 10/4). Continue zosyn (10/4-present). Consider de-escalation to rocephin once sensitivities of respiratory culture result. 3. Severe pneumonia, bilateral: Diffuse bilateral disease noted on CTA (10/3) and CXR (10/4) concerning for PNA. Tmax last 24h 98.7. WBC 13.6. Respiratory culture (10/3) w/ normal yony. Respiratory culture (10/5) staph coagulase positive. Molecular PNA panel (10/5) was positive for staph aureus. MRSA screen PCR negative. COVID-19 screen negative x2. Continue decadron 4 mg daily for 4 days (10/4-10/8).  Continue zosyn (10/4-present). Follow up culture results and sensitivities before de-escalating to rocephin. 4. Acute kidney injury: Improving. Cr 1.2 today 10/8, from peak of 1.6 on 10/4. Baseline 1.0-1.18. Monitor BMP daily w/ scheduled lasix as above. Urine output adequate; continue to monitor I/O q8h.  FENa 1.5%; likely ATN from acute HFrEF exacerbation. Avoid nephrotoxins. 5. Complicated UTI: urine culture positive E. Coli (10/3 and 10/4). Continue zosyn as above for UTI and pneumonia coverage. Sensitive to zosyn. Continue zosyn as above until respiratory culture sensitivities result. 6. Hypernatremia: Na amol to 148 (10/8) from 144 (10/7) w/ initiation of 0.45% IVF (10/7). IVF stopped today 10/8. Repeat BMP. 7. DM type II: Uncontrolled. HgbA1C 8.7% (7/2020). LGDX [phone] SHHUK. Continue nightly lantus 45 units, and q4h SSI lispro high-dose correction. Holding home metformin, victoza and actos.   8. COPD/Asthma, history of: Not in acute exacerbation. Continue symbicort, spiriva. No wheezing at this time on exam. Last PFTs (2/2020) showed FEV1 69%. 9. Sleep apnea, history of: Resume noct. CPAP. Continue home modafinil. 10. HTN: BP 147/88 today. Increase metoprolol to 75 mg BID. Holding home losartan, thiazide given DAI. 11. CAD, history of: Last cardiac cath 1/2020. Demonstrated no sgnificant CAD at that time. Denies chest pain. Troponin negative. Continue home aspirin 81 mg daily. BB as above. 12. Polycythemia, history of: Hgb 16.3 today. Monitor CBC daily. 15. Urinary retention, history of: Continue proscar. Holding home flomax. Monroe catheter in place. Urine output adequate. 14. HLD, history of: Continue home lipitor. Last lipid panel (7/2020): total chol 199, HDL 36, LDL 96, Trig 504.  15. GERD, history of: Continue pepcid 20 mg BID. 16. Anxiety, depression:  Continue home buspar. 17. Acute hypoxic respiratory failure (resolved): secondary to severe pneumonia, pulmonary edema.  Intubated 10/3 after BiPAP failure. Extubated 10/5 but required re-intubation a few hours later after HF NC and BiPAP failure. Successfully extubated 10/8 after aggressive diuresis since 10/5. On 2L NC.    18. Bilateral pleural effusions (resolved): Small, bilateral on CTA (10/3). Likely secondary to acute on chronic HFrEF. Resolved on CXR (10/7). CC:  Dyspnea  HPI: Jamaal Castillo is a 76 y.o. male former-smoker (quit 1992) w/ a PMH of HTN, HLD, chronic systolic HFrEF (last EF 03-28% 2/2020), CAD, DM type II, asthma, COPD, sleep apnea (uses home CPAP), loss of vision (s/p right eye surgery), GERD, migraines, anxiety and depression.     He presented to [facility] ED on 10/3/2020, w/ compaints of increasing dyspnea over several weeks, but acutely worsened the day of presentation. On arrival, SpO2 was 76% on room air. He was placed on NRB support, w/ improvement. He continued to be tachypneic and labored. BiPAP therapy was started w/ no improvement. He required subsequent intubation. He received 3x albuterol and 1x Duoneb treatments, 125 mg of solu-medrol IV, and lopressor 5 mg IV once. SBP was 200 on arrival. He dropped to SBP 90 after lopressor was given. He was admitted to ICU for further care.      CTA of the chest showed bilateral infiltrates w/ combination of consilidation w/ extensive groundglass appearance. COVID-19 screen was negative twice. He developed an DAI, and procal was elevated to 4. 11. Temp was 100. 6. Zosyn was initiated w/ IV decadron for severe pneumonia. Vanc was discontinued once MRSA screen was negative. Urine culture grew E.Coli.     On 10/5/20, he was successfully extubated and remained on 3L NC and unlabored for multiple hours. He then acutely deteriorated, becoming hypoxic and tachypneic. Albuterol, high flow NC, and BiPAP were given without improvement. Lasix 60 mg IV BID was initiated. However, he required re-intubation. ECHO later resulted showing drop of EF to 25-30% (10/5) from last EF of 35-40% (2/2020).  The patient's spouse did add that the patient was following at one time w/ Dr. Loretta Gaming as his cardiologist, but he \"hasn't followed-up for a while\" given social distancing concerns related to the COVID-19 pandemic. Molecular pneumonia panel positive for staph aureus, not MRSA. Continuing w/ zosyn.     Overnight 10/7-10/8, a code stroke was called given patient not following commands and not withdrawing extremities to pain, and absent touch sensation on left arm. He rapidly improved w/ stop of sedative medications (patient was deeply sedated on propofol, precedex drips). CT and CTA of head and neck were insignificant for any acute pathology. No TPA was administered. Stroke was felt to be unlikely by Dr. Britni Marie. On 10/8 he was successfully extubated. He remained on 2L NC thereafter. He remained stable throughout the day and transfer orders were placed to step-down. He had no stroke-like symptoms upon exam. Equal strength x4 extremities, spontaneous purposeful movement of all extremities, equal sensation. ROS: Review of Systems   Constitutional: Negative for chills, diaphoresis, fatigue and fever. HENT: Negative for congestion, drooling, hearing loss, sinus pressure, sore throat, trouble swallowing and voice change. Eyes: Negative for redness and visual disturbance. Respiratory: Positive for cough (productive). Negative for chest tightness, shortness of breath and wheezing. Cardiovascular: Negative for chest pain, palpitations and leg swelling. Gastrointestinal: Negative for abdominal distention, abdominal pain, constipation, diarrhea, nausea and vomiting. Genitourinary: Negative for difficulty urinating and dysuria. Monroe catheter   Musculoskeletal: Negative for back pain and neck pain. Skin: Negative for color change, rash and wound. Neurological: Negative for dizziness, tremors, seizures, syncope, facial asymmetry, speech difficulty, weakness, light-headedness, numbness and headaches. or organomegaly. Nontender. BS active x4 quadrants. Extremities:  No clubbing, cyanosis, or edema x 4. Vasculature: capillary refill < 3 seconds. Palpable 2+ dorsalis pedis pulses. Skin:  warm and dry. Intact. No rash. Psych:  Alert and oriented x2. Affect appropriate. Lymph:  No supraclavicular adenopathy. Neurologic:  No focal deficit. No seizures. Spontaneous purposeful movements x4 extremities. Equal strength, moderate. Equal sensation bilateral UE's. Follows commands. Equal shoulder shrug. Speech logical, clear, no slurring. Data: (All radiographs, tracings, PFTs, and imaging are personally viewed and interpreted unless otherwise noted).  Telemetry: NSR. No ectopy. HR 87.  · 12-lead ECG (10/4): sinus tachycardia, ventricular rate 104. T-wave inversion in anterolateral leads, No ST elevation/depression.   · ECHO (10/5) report: limited echo. Ejection fraction is visually estimated at 25-30%. There was severe global hypokinesis of the left ventricle. Akinetic motion of the mid anteroseptal wall noted in the left ventricle. The aortic valve leaflets were not well visualized. Aortic valve appears tricuspid. Aortic valve leaflets are somewhat thickened.    Na 148, K 3.5, Cl 110, CO2 26, BUN 31, Cr 1.2, Mg 2.4, Ca 8.9, Gluc 132-178   WBC 13.6, Hgb 16.3, Hct 52.6, Plt 161  · ICa 1.08 (10/4)  · Lactic acid 1.7 (10/4), procal 4.11 (10/4)  · Troponin 0.016 (10/4), Pro-.5 (10/4), TSH 0.656 (10/4)  · Blood culture 1 (10/4): preliminary no growth  · Blood culture 2 (10/4): preliminary no growth  · Respiratory culture (10/4): normal yony   · Respiratory culture (10/5): staphylococcus (coagulase positive)  · Molecular pneumonia panel (10/3): negative  · Molecular pneumonia panel (10/5): positive staph aureus  · UA (10/4): yellow, moderate blood, protein 300, positive nitrites, small leukocyte, 8-15 casts, WBC , many bacteria   UA (10/8): yellow, protein 100, trace leukocytes, no casts, 10-15 WBC, no bacteria  · Urine culture (10/3): positive E. Coli, cfu >100,000  · Urine culture (10/4): positive E. Coli, cfu <10,000  · Urine legionella (10/5): negative  · Urine strep pneumo antigen (10/5): negative  · MRSA screen (10/4): negative  · VRE screen (10/4): negative  · COVID-19 screen (10/4): negative  · CTA w/ contrast (10/3) report: The distal tip of the endotracheal tube is 3.3 cm above the level of the ila. The esophageal tube extends to the stomach. There is a right jugular central venous catheter with the distal tip within the superior vena cava. There are small bilateral pleural effusions, right greater than left. There is infiltrate throughout both lung fields with additional consolidation within the mid and lower chest. Clinical management is recommended. Follow-up chest radiographs are also recommended to confirm complete resolution. There are mediastinal and hilar lymph nodes which may be reactive related to the inflammatory process. A follow-up CT examination of the chest with intravenous contrast in 3 months is recommended to confirm stability/resolution. There is no pulmonary embolus. · CXR (10/4) report: Tubes and line are as described above. Bilateral diffuse airspace disease appears similar to the prior study and may represent pulmonary edema versus atypical pneumonia.   · CXR (10/7) report: Significant improvement in bilateral consolidations.  CT head w/out contrast (10/8) report: No acute intracranial findings.  CTA neck w/ w/out contrast (10/8) report: Patent neck CTA. Thyroid isthmus nodule. Consider nonemergent thyroid ultrasound.  CTA head w/ w/out contrast (10/8) report: Patent head CTA.  CXR (10/8) report: Support devices in place. No focal lung consolidation. Case discussed w/ Dr. Katerin Saldana. Electronically signed by HOSEA Hayward                                     Patient seen by me. Case discussed with nurse practitioner.   Patient continues to improve clinically. Continue to diurese aggressively. Continue with antibiotics. From my perspective can transition from the ICU to medical floor. Electronically signed by Kenji Flood MD.

## 2020-10-08 NOTE — VIRTUAL HEALTH
[facility] [place] Stroke and V[facility] Neurology Consult for  [facility] Stroke Alert through [address] @ 066GK 10/8/2020  10/8/2020 2:34 AM  Pt Name: Marcus Blanco  MRN: [id number]  YOB: 1955  Date of evaluation: 10/8/2020  Primary Care Physician: KRYSTAL Viramontes CNP  Reason for Evaluation: Stroke Evaluation with Discussion with Ed or primary team with Telemedicine and stroke evaluation with Review of imaging and labs    69yo male with pmh of dm, hld, bph, htn, copd, obesity. Pt admitted to hospital 10/3/2020 for PNA and respiratory failure requiring intubation. Code stroke called for acute quadriplegia and 4 limb numbness. Pt was recently extubated then intubated again due to respiratory failure. At baseline pt is awake and able to follow commands, moves all 4 limbs equally. LKN 10pm 10/8/2020. Pt was seen 2AM to have flaccid weakness in all 4 limbs and no response to pain. Pt was awake and able to wink on command. Code stroke was called. Following imaging pt rapidly improved. sbp 164 glu 293. Allergies  has No Known Allergies. Medications  Prior to Admission medications    Medication Sig Start Date End Date Taking? Authorizing Provider   Umeclidinium Bromide (INCRUSE ELLIPTA) 62.5 MCG/INH AEPB inhalation daily 8/27/20   KRYSTAL Guaman CNP   ammonium lactate (AMLACTIN) 12 % cream Apply topically as needed for Dry Skin Apply topically as needed.     Historical Provider, MD   atorvastatin (LIPITOR) 10 MG tablet Take 10 mg by mouth 3 times daily Take one tablet by mouth three times daily    Historical Provider, MD   insulin aspart (NOVOLOG FLEXPEN) 100 UNIT/ML injection pen Inject into the skin 3 times daily (before meals) Sliding scale (max 36 units/day)    Historical Provider, MD   tamsulosin (FLOMAX) 0.4 MG capsule Take 0.4 mg by mouth daily    Historical Provider, MD   losartan-hydrochlorothiazide (HYZAAR) 50-12.5 MG per tablet Take 1 tablet by mouth daily    Historical Historical Provider, MD   albuterol sulfate  (90 Base) MCG/ACT inhaler Inhale 2 puffs into the lungs every 6 hours as needed for Wheezing    Historical Provider, MD   Liraglutide (VICTOZA) 18 MG/3ML SOPN SC injection Inject 1.2 mg into the skin daily    Historical Provider, MD    Scheduled Meds:   insulin lispro  0-18 Units Subcutaneous Q4H    multivitamin+  30 mL Oral Daily    insulin glargine  45 Units Subcutaneous Nightly    bisacodyl  10 mg Rectal Daily    famotidine  20 mg Oral BID    furosemide  60 mg Intravenous BID    ketamine  100 mg Intravenous Once    propofol  50 mg Intravenous Once    piperacillin-tazobactam  3.375 g Intravenous Q8H    aspirin  81 mg Oral Daily    atorvastatin  10 mg Oral TID    baclofen  10 mg Oral Daily    pregabalin  75 mg Oral BID    tiotropium  2 puff Inhalation Daily    sodium chloride flush  10 mL Intravenous 2 times per day    enoxaparin  40 mg Subcutaneous Daily    [Held by provider] losartan  50 mg Oral Daily    And    [Held by provider] hydroCHLOROthiazide  12.5 mg Oral Daily    metoprolol  50 mg Oral BID    busPIRone  5 mg Oral TID    finasteride  5 mg Oral Daily    modafinil  100 mg Oral Daily    budesonide-formoterol  2 puff Inhalation BID     Continuous Infusions:   sodium chloride 75 mL/hr at 10/07/20 1816    propofol Stopped (10/08/20 0135)    fentaNYL (SUBLIMAZE) 500 mcg in sodium chloride 0.9 % 100 mL Stopped (10/06/20 0617)    dexmedetomidine 0.2 mcg/kg/hr (10/08/20 0227)    dextrose       PRN Meds:.albuterol, albuterol sulfate HFA, sodium chloride flush, acetaminophen **OR** acetaminophen, polyethylene glycol, promethazine **OR** ondansetron, glucose, dextrose, glucagon (rDNA), dextrose  Past Medical History   has a past medical history of Acid reflux, Anxiety, Arthritis, Asthma, Change in bowel habits, Constipation, COPD (chronic obstructive pulmonary disease) ([facility] [address] COPD (chronic obstructive pulmonary disease) ([facility] [address] Depression, Diarrhea, Heartburn, Hyperlipidemia, Hypertension, Hypogonadism male, Loss of vision, Migraine, Neuralgia, Shortness of breath, Sleep apnea, Testosterone deficiency in male, and [facility]   Social History  Social History     Socioeconomic History    Marital status:      Spouse name: Not on file    Number of children: Not on file    Years of education: Not on file    Highest education level: Not on file   Occupational History    Not on file   Social Needs    Financial resource strain: Not on file    Food insecurity     Worry: Not on file     Inability: Not on file    Transportation needs     Medical: Not on file     Non-medical: Not on file   Tobacco Use    Smoking status: Former Smoker     Packs/day: 1.00     Years: 15.00     Pack years: 15.00     Types: Cigarettes     Start date: 1977     Last attempt to quit: 1992     Years since quittin.7    Smokeless tobacco: Never Used   Substance and Sexual Activity    Alcohol use: Yes     Comment: occ    Drug use: Never    Sexual activity: Not on file   Lifestyle    Physical activity     Days per week: Not on file     Minutes per session: Not on file    Stress: Not on file   Relationships    Social connections     Talks on phone: Not on file     Gets together: Not on file     Attends Islam service: Not on file     Active member of club or organization: Not on file     Attends meetings of clubs or organizations: Not on file     Relationship status: Not on file    Intimate partner violence     Fear of current or ex partner: Not on file     Emotionally abused: Not on file     Physically abused: Not on file     Forced sexual activity: Not on file   Other Topics Concern    Not on file   Social History Narrative    Not on file     Family History      Problem Relation Age of Onset    Colon Cancer Neg Hx     Colon Polyps Neg Hx     Liver Cancer Neg Hx     Esophageal Cancer Neg Hx     Rectal Cancer Neg Hx     Stomach Cancer Neg Hx        OBJECTIVE  Vitals:    10/08/20 0233   BP: (!) 164/92   Pulse: 99   Resp: 13   Temp: 100 °F (37.8 °C)   SpO2: 98%        General:  Gen: obese habitus, NAD  HEENT: NCAT, mucosa moist  Cvs: RRR, S1 S2 normal  Resp: intermittent severe coughing resulting in spasm of all 4 limbs. Intubated. Abd: s/nd/nt  Ext: no edema  Skin: no lesions seen, warm and dry    Neuro:  Gen: awake and alert, unable to assess orientation   Lang/speech: mute, intubated. Follows simple commands. CN: PERRL, EOMI, VFF, face symmetric, hearing intact  Motor: at least 3/5 all 4 ext. No drift. Required prompting by passive lifting, but will keep antigravity. Sense: no w/d to pain all 3 ext. Coord: no gross dysmetria  DTR: deferred  Gait: deferred    NIH Stroke Scale:   1a  Level of consciousness: 0 - alert; keenly responsive   1b. LOC questions:  2 - answers neither question correctly   1c. LOC commands: 0 - performs both tasks correctly   2. Best Gaze: 0 - normal   3. Visual: 0 - no visual loss   4. Facial Palsy: 0 - normal symmetric movement   5a. Motor left arm: 0 - no drift, limb holds 90 (or 45) degrees for full 10 seconds   5b. Motor right arm: 0 - no drift, limb holds 90 (or 45) degrees for full 10 seconds   6a. Motor left le - no drift; leg holds 30 degree position for full 5 seconds   6b  Motor right le - no drift; leg holds 30 degree position for full 5 seconds   7. Limb Ataxia: 0 - absent   8. Sensory: 2 - severe to total sensory loss; patient is not aware of being touched in face, arm, leg   9. Best Language:  3 - mute, global aphasia; no usable speech or auditory comprehension   10. Dysarthria: UN - intubated or other physical barrier   11.  Extinction and Inattention: 0 - no abnormality         Total:   7     Premorbid MRS: 0      Imaging:  Images were personally reviewed including:  CT brain without contrast: no large territory hypodensity or bleed  CTA imaging: no LVO    Assessment  65yo male with pmh of dm, hld, bph, htn, copd, obesity. Pt admitted to hospital 10/3/2020 for PNA and respiratory failure requiring intubation. Code stroke called for acute quadriplegia and 4 limb numbness, now rapidly improving. CTA shows no lvo (specifically no top of basilar clot), CT brain neg for acute changes. R/o stroke or spinal etiology. Recommendations:  --no tpa, repidly improving. Less likely to be stroke  --no mt, no lvo  --mri brain wo con  --mri c spine wo con  --remainder of workup and care as per neurology  --continue asa 81 and lipitor 10    Discussed with nurse    At least 30 min of critical care time spent through telemedicine robot at patient bedside (via interactive/real-time software) as patient is in imminent and life threatening deterioration with further treatment and evaluation. This Virtual Visit was conducted with patient's (and/or legal guardian's) consent, to provide telestroke consultation and necessary medical care.   Time spent examining patient, reviewing the images personally, reviewing the chart, perform high complexity decision making and speaking with the nursing staff regarding recommendations    Brennan Mejía MD PhD  Stroke, Neurocritical Care And/or [address] [facility]  [id number] [address] [place]  Electronically signed 10/8/2020 at 2:34 AM

## 2020-10-08 NOTE — PLAN OF CARE
of physical injury  Description: Absence of physical injury  Outcome: Ongoing  Note: No physical injury occurred. Problem: Nutrition  Goal: Optimal nutrition therapy  Outcome: Ongoing  Note: Diet changed to general with carb count. Care plan reviewed with patient. Patient verbalize understanding of the plan of care and contribute to goal setting.

## 2020-10-08 NOTE — SIGNIFICANT EVENT
Intensivist Progress Note      Patient:  Shelly Aguila    Unit/Bed:4D-03/003-A  YOB: 1955  MRN: [id number]   Acct: [de-identified]     PCP: KRYSTAL Whitmore CNP  Date of Admission: 10/3/2020    I evaluated the patient as notified per RN that patient no longer following commands and does not withdraw to pain. Sedation had been decreased since pervious assessment. IV precedex and propofol turned off by me. Patient tracking and nodding appropriately upon my evaluation. Nods he can feel touch on right arm but not on left. No withdrawal to pain or movement in all 4 extremities. I updated Dr. Jose Guadalupe Raza, Telestroke physician, per phone with findings, patient history, NIH 15, and last known well. Per notes upon his evaluation NIH rapidly improved. Recommendations included Neurology consult, MRI brain/spine. Orders placed.     KRYSTAL Fofana CNP

## 2020-10-08 NOTE — PLAN OF CARE
Problem: Impaired respiratory status  Goal: Will be able to breathe spontaneously, without ventilator support  Description: Will be able to breathe spontaneously, without ventilator support  10/8/2020 0544 by Cipriano Murdock RCP  Outcome: Ongoing  Note: Pt flipped to pont mode this morning at 0445  PS of 8, peep 6, 30%  Rsbi 27, Sat 97%, Rate 12, Vt 400-500  Tolerating well, cuff leak noted

## 2020-10-08 NOTE — PROGRESS NOTES
Patient has been successfully weaned from Mechanical Ventilation. RSBI before extubation was 27 with EtCO2 of 50 and SpO2 of 97 on 30% FiO2. Patient extubated and placed on 2 liters/min via nasal cannula. Post extubation SpO2 is 96% with HR  85 bpm and RR 16 breaths/min. Patient had mild cough that was productive of tan sputum. Extubation Well tolerated by patient. Blanchie Bevels

## 2020-10-08 NOTE — CARE COORDINATION
10/8/20, 1:52 PM EDT    DISCHARGE ON [address]  day: 5  Location: -03/003-A Reason for admit: Respiratory failure [facility]) [J96.90]   Procedure:   10/3 Intubated  10/3 CVC right subclavian  10/3 CTA Chest: There are small bilateral pleural effusions, right greater than left. There is infiltrate throughout both lung fields with additional consolidation within the mid and lower chest; There are mediastinal and hilar lymph nodes which may be reactive related to the inflammatory process; neg for PE  10/5 Extubated/Reintubated  10/5 Echo with EF 25-30%  10/8 CT Head: No acute findings  10/8 CTA Head/Neck: Patent; Thyroid isthmus nodule  10/8 Extubated  10/8 CXR: No focal lung consolidation    Treatment Plan of Care: Patient was code stroke early this morning d/t flaccid weakness x 4 extremities with no response to pain. Pt was awake and able to wink on command on vent. NIH 7. Pt rapidly improved after CT scan. No TPA, not likely stroke. Extubated this morning. Precedex off this morning. Having loose stools. Plan for possible transfer out of ICU later today. Sats 98% on 2L O2. Tmax 100.4. NSR. Ox4. Follow commands. Dietitian consulted. PT/OT. Respiratory culture +Staph Aureus by PCR. Urine +EColi. COVID 19 was negative. Telemetry, I&O, daily weight, miller care. IVF, asa, lipitor, baclofen, inhaler, buspar, lovenox, pepcid, proscar, IV lasix 60 mg bid, lantus, SSI Q4H, lopressor, provigil, IV zosyn, lyrica. Na+ 148, BUN 31, creat down to 1.2, wbc up to 13.6. Barriers to Discharge: not medically ready  PCP: KRYSTAL Moreno CNP  Readmission Risk Score: 20%  Patient Goals/Plan/Treatment Preferences: From home alone. Has cpap and nebs. Monitor therapy evals for possible needs.

## 2020-10-08 NOTE — FLOWSHEET NOTE
10/08/20 0443   Provider Notification   Reason for Communication New orders   Provider Name Elijah Alvarez   Provider Notification Advance Practice Clinician (CNS, NP, CNM, CRNA, PA)   Method of Communication Face to face   Response See orders   Notification Time [id number]        10/08/20 0443   Provider Notification   Reason for Communication New orders   Provider Name Elijah Alvarez   Provider Notification Advance Practice Clinician (CNS, NP, CNM, CRNA, PA)   Method of Communication Face to face   Response See orders   Notification Time [phone]     Patient Having copious amounts of Oral secretions and is fighting the vent. SONALI mckeon Ordered 50 mcg of Fentanyl    Then at 0500, RT noticed the Cuff Leak on the Patient, and notified provider.

## 2020-10-08 NOTE — PROGRESS NOTES
Home: Apartment  Home Layout: One level  Home Access: Elevator(6th floor)  Home Equipment: [address]        Ambulation Assistance: Independent  Transfer Assistance: Independent    Active : Yes     Additional Comments: Pt reports I prior to admission without AD. OBJECTIVE:  Range of Motion:  Bilateral Lower Extremity: WFL    Strength:  Bilateral Lower Extremity: WFL    Balance:  Static Standing Balance: Stand By Assistance, Contact Guard Assistance  Dynamic Standing Balance: Stand By Assistance, Contact Guard Assistance    Bed Mobility:  Supine to Sit: Minimal Assistance, with head of bed raised, with verbal cues , with increased time for completion  Sit to Supine: Minimal Assistance, with head of bed raised, with verbal cues , with increased time for completion     Transfers:  Sit to Stand: Moderate Assistance, with increased time for completion, cues for hand placement, to/from chair with arms, partial sit <> stand - pt unsafe and reaching for [facility]   Stand to Sit:Stand By Assistance  Pt encouraged to sit back down for safety - at this time he notes increased dizziness, BP checked: 176/113 (pt was returned to supine)    Supine BP upon entry: 162/85  Seated BP: 188/102  Seated BP 2-3 minutes: 183/100  Seated BP after standing attempt: 176/113, RN notified and pt was returned to supine    Functional Outcome Measures: Completed  AM-PAC Inpatient Mobility without Stair Climbing Raw Score : 11  AM-PAC Inpatient without Stair Climbing T-Scale Score : 35.66    ASSESSMENT:  Activity Tolerance:  Patient tolerance of  treatment: fair to poor - BP limiting mobility this date. Treatment Initiated: Treatment and education initiated within context of evaluation.   Evaluation time included review of current medical information, gathering information related to past medical, social and functional history, completion of standardized testing, formal and informal observation of tasks, assessment of data and development of plan of care and goals. Treatment time included skilled education and facilitation of tasks to increase safety and independence with functional mobility for improved independence and quality of life. Pt tolerated sitting EOB for ~ 8 minutes. No LOB noted. Cueing for safety throughout EOB activity. BP monitored throughout session, ending session early secondary to dizziness and high BP. Assessment: Body structures, Functions, Activity limitations: Decreased functional mobility , Decreased safe awareness, Decreased strength, Increased pain, Decreased balance, Decreased posture, Decreased endurance  Assessment: Pt admitted secondary to respiratory failure and was intubated, extubated and re-intubated during admission. Pt was extubated this AM. He demonstrates a decrease in baseline by way of transfers and bed mobility. He will benefit from skilled PT services during admission and post d/c for improved functional I and safety with mobility.   Prognosis: Good    REQUIRES PT FOLLOW UP: Yes    Discharge Recommendations:  Discharge Recommendations: Subacute/Skilled Nursing Facility    Patient Education:  PT Education: Plan of Care, PT Role, Goals    Equipment Recommendations:  Equipment Needed: No    Plan:  Times per week: 5xGM  Current Treatment Recommendations: Strengthening, Patient/Caregiver Education & Training, Balance Training, Functional Mobility Training, Endurance Training, Transfer Training, Safety Education & Training, Home Exercise Program, Neuromuscular Re-education, Equipment Evaluation, Education, & procurement    Goals:  Patient goals : go home, I  Short term goals  Time Frame for Short term goals: by discharge  Short term goal 1: rolling with supervision A for ease of getting in/out of bed  Short term goal 2: supine <> sit with stand-by assist to prepare for OOB activity  Short term goal 3: PT to assess further mobility when appropiate  Long term goals  Time Frame for Long term goals : N/A secondary to

## 2020-10-08 NOTE — FLOWSHEET NOTE
10/08/20 0221   NIH/MNHISS Stroke Scale   NIH/MNIHSS Stroke Scale Assessed Yes   Interval Reassessment  (reassesment with Dr. Tim Stephenson via video communication)   Level of Consciousness (1a. ) 0   LOC Questions (1b. ) 2   LOC Commands (1c. ) 0   Best Gaze (2. ) 0   Visual (3. ) 0   Facial Palsy (4. ) 0   Motor Arm, Left (5a. ) 0   Motor Arm, Right (5b. ) 0   Motor Leg, Left (6a. ) 0   Motor Leg, Right (6b. ) 0   Limb Ataxia (7. ) 0   Sensory (8. ) (!) 2   Best Language (9. ) 3   Dysarthria (10. ) UN   Extinction and Inattention (11) 0   Modified Total 7   Total 7   nihs preformed with Dr. Denise Garibay via telestroke. Dr. Denise Garibay also Suggested MRI of Brain wo Contrast and C spine wo contrast, and neurology consult.  Ashley Llamas verbally Stated to Order all in her name

## 2020-10-08 NOTE — PROGRESS NOTES
Called to pt room due to vent alarming  Added bite block due to pt biting down hard on tube, Ett remained at the 25 at lip  Air leak heard and vent is reading 50-70% leak.   Vt ranges from 100-400, Sat remains % on 35% fio2  Edwin Flatten made aware of pt leak, will continue to monitor if it needs swapped out

## 2020-10-08 NOTE — PROGRESS NOTES
2223 Entered Patient's Room to Turn patient. Patient was Turned and had a RASS 1 point above goal. Propofol was decreased per titration instructions. 0020: Entered Patients Room to preform Patient's Head to toe Assessment, Patient was unable to Respond to commands, Sedation Titrated    0050, Entered Patients room to attempt to reassess Patient. Sternal Rub and Repetitive Calling of patient's name. Patient has been known to be difficult to Awaken, sedatives were titrated down. Patient still not responding to commands. No deep nail pain response. 0123 SONALI Agudeloer notified, she assessed the Patient and put in orders For Ct of the head. SONALI Lorenz stopped both sedatives. Rapid and resource nurses called, upon arrival Rapid and resource nurses wanted to Call a Code Stroke.  Code Stroke Called   Code Stroke    Patient- Mallika Thompson   4D-03/003-A   10/8/2020   Attending Physician- MD Urbano Cash CNP    Code stroke called for sudden paralysis of generalized Extremities      Vitals:    10/08/20 0745   BP:    Pulse:    Resp: 11   Temp:    SpO2: 96%        Recent Results (from the past 24 hour(s))   POCT glucose    Collection Time: 10/07/20 10:13 AM   Result Value Ref Range    POC Glucose 324 (H) 70 - 108 mg/dl   POCT Glucose    Collection Time: 10/07/20  3:37 PM   Result Value Ref Range    POC Glucose 315 (H) 70 - 108 mg/dl   POCT glucose    Collection Time: 10/07/20  7:59 PM   Result Value Ref Range    POC Glucose 385 (H) 70 - 108 mg/dl   POCT glucose    Collection Time: 10/07/20 11:36 PM   Result Value Ref Range    POC Glucose 349 (H) 70 - 108 mg/dl   POCT Glucose    Collection Time: 10/08/20  1:12 AM   Result Value Ref Range    POC Glucose 322 (H) 70 - 108 mg/dl   POCT Glucose    Collection Time: 10/08/20  1:38 AM   Result Value Ref Range    POC Glucose 293 (H) 70 - 108 mg/dl   Microscopic Urinalysis    Collection Time: 10/08/20  4:09 AM   Result Value Ref Range    Glucose, Urine NEGATIVE NEGATIVE mg/dl    Bilirubin Urine NEGATIVE NEGATIVE    Ketones, Urine NEGATIVE NEGATIVE    Specific Gravity, UA >1.030 (A) 1.002 - 1.030    Blood, Urine NEGATIVE NEGATIVE    pH, UA 5.0 5.0 - 9.0    Protein,  (A) NEGATIVE mg/dl    Urobilinogen, Urine 0.2 0.0 - 1.0 eu/dl    Nitrite, Urine NEGATIVE NEGATIVE    Leukocytes, UA TRACE (A) NEGATIVE    Color, UA YELLOW YELLOW-STRAW    Character, Urine CLEAR [id number].[id number]    RBC, UA 3-5 0-2/hpf /hpf    WBC, UA 10-15 0-4/hpf /hpf    Epithelial Cells, UA 0-2 3-5/hpf /hpf    Bacteria, UA NONE SEEN FEW/NONE SEEN    Casts NONE SEEN NONE SEEN /lpf    Crystals NONE SEEN NONE SEEN    Renal Epithelial, UA NONE SEEN NONE SEEN    Yeast, UA NONE SEEN NONE SEEN    Casts NONE SEEN /lpf    Miscellaneous Lab Test Result NONE SEEN    POCT glucose    Collection Time: 10/08/20  4:24 AM   Result Value Ref Range    POC Glucose 214 (H) 70 - 108 mg/dl        Chem stick- 293     Time to CT scan- 0148  Time back to Room- 0218     INITIAL NIH STROKE SCALE    Time Performed:  145    1a. Level of consciousness:  0 - alert; keenly responsive  1b. Level of consciousness questions:  1 - answers one question correctly  1c. Level of consciousness questions:  0 - performs both tasks correctly  2. Best Gaze:  0 - normal  3. Visual:  0 - no visual loss  4. Facial Palsy:  0 - normal symmetric movement  5a. Motor left arm:  1 - drift, limb holds 90 (or 45) degrees but drifts down before full 10 seconds: does not hit bed  5b. Motor right arm:  1 - drift, limb holds 90 (or 45) degrees but drifts down before full 10 seconds: does not hit bed  6a. Motor left le - no movement  6b. Motor right le - no movement  7. Limb Ataxia:  2 - present in two limbs  8. Sensory:  2 - severe to total sensory loss; patient is not aware of being touched in face, arm, leg  9. Best Language:  0 - no aphasia, normal  10. Dysarthria:  UN - intubated or other physical barrier  11. Extinction and Inattention:  0 - no abnormality    TOTAL:  15      Transferred- No    End time of Code Stroke- 0142     Electronically signed by Polina Brown RN on 10/8/2020 at 8:15 AM

## 2020-10-09 NOTE — PLAN OF CARE
Intensivist Progress Note      Patient:  Nabil Mckeonal    Unit/Bed:4D-03/003-A  YOB: 1955  MRN: [id number]   Acct: [de-identified]     PCP: KRYSTAL Askew CNP  Date of Admission: 10/3/2020    Sign out per phone to Meredith Chang Hospitalist, [place]. Patient extubated 10/8/2020. Precedex IV gtt off. Awaiting BMP to monitor sodium level. Lantus and SSI adjusted as patient with decreased po intake. Pateint to transfer to .       KRYSTAL Mcallister CNP

## 2020-10-09 NOTE — PROGRESS NOTES
**This is a Medical/ PA/ APRN Student Note and is charted for educational purposes. The non-physician staff attested note is not to be used for billing purposes or to guide patient care. Please see the physician modifications/ attestation for treatment plan/suggestions. This note has been reviewed and feedback has been provided to the student. *     Hospitalist Progress Note    Patient:  Lila Gonzalez      Unit/Bed:-17/017-A    YOB: 1955    MRN: [id number]       Acct: [de-identified]     PCP: KRYSTAL Valdez CNP    Date of Admission: 10/3/2020    Assessment/Plan:    1. Acute Respiratory Failure, resolved: Extubated 10/08 without apparent complications. Patient satting well on room air. Monitor and supplement O2 to maintain O2>93%. 2. Hypertensive Urgency/ Hypertension: BP has been consistently elevated with -190s despite IV metoprolol succinate 75mg, max  this AM. No evidence of end organ damage such as headaches or dizziness. Adjust antihypertensive regimen as follows: switch IV metroprolol tartrate to po metroprolol succinate 100mg BID to match pt's home dose. Continue to hold home Losartan/HCTZ, will trial po Entresto BID and re-evaluate. 3. Acute on Chronic Congestive Heart Failure: EF 10/5 of 25-30%, down from EF 35-40% on 02/2020. Cardiac cath 01/2020 showed no significant CAD. Will continue IV Lasix 60 mg BID. Consult cardiology (pt sees Cy [facility]ads). 4. Bilateral Pneumonia vs Pulmonary Edema: CXR and CT on admission 10/3 showing bilateral diffuse opacities and infiltrate. CXR 10/8 shows resolution of bilateral infiltrates; rapid improvement more suggestive of CHF/pulmonary edema vs pneumonia. Molecular PNA panel on 10/5 positive for staph aureus. S/p decadron x 4d. On Day 6 of Zosyn (10/4-current). 5. Sepsis, resolved: Patient meeting 1/4 SIRS criteria, as above pulmonary edema seems more likely vs pneumonia. Continue Zosyn (10/4-current).    6. Diarrhea, Abdominal Pain: Has h/o IBS, patient reports loose stools and abdominal pain/distention is not new to him. Sometimes relieved by OTC Imodium. Hold stool softeners. 7. Paralytic Ileus: Abdominal XR showing moderate gaseous distention of almost entire colon consistent with colonic ileus and suggestive of mild small bowel distention. Upon discussion with patient, he is amenable to trying rectal tube for his ileus and diarrhea. 8. UTI: urine cx 10/3 positive for E.Coli. On Zosyn as above. 9. Acute Kidney Injury, stable: BUN 25 and Cr 1.3. Likely secondary to CHF exacerbation. Continue Lasix. 10. Hyperkalemia, resolved, now hypokalemia: K 3.3, replete per protocol. Repeat BMP in AM.   11. Hypernatremia, resolved  12. Type 2 DM: last A1C 07/2020 is 8.7. Continue current regimen. Glucose checks. 13. H/O HLD: on home statin  14. H/O polycythemia: Hgb 16.5  15. H/O COPD/Asthma: continue current regimen, no evidence of acute episode. 16. H/O JOSE A: on home CPAP and modafinil. 17. H/O Urinary Retention: on Proscar. 18. H/O GERD: on Pepcid  19. H/O Anxiety/Depression: on Buspar. Expected discharge date:      Disposition:    [] Home       [] TCU       [] Rehab       [] Psych       [] SNF       [] [facility]       [] Other-    Chief Complaint: shortness of breath    Hospital Course:  10/3: presented to ED with 2 weeks of increased dyspnea. Satting 76% on RA, failed trials of nonrebreather and BiPAP, intubated and ventilated. Admitted to ICU.  on arrival. CXR and CT with bilateral infiltrates. 10/4: started on Zosyn and decadron. Urine cx positive for E.Coli. 10/5: Extubated, and later re-intubated. Started Lasix. ECHO with EF of 25-30%. 10/8: Extubated in PM, transferred to floor. 10/9: loose stool and increasing abdominal distention. KUB showing colonic ileus. Max  in AM.     Subjective (past 24 hours):   Patient transferred to floor from ICU.      Nurse reports loose bowel movements and persistent hypertension with -200. Patient denies any headache, dizziness, or other new symptoms. Patient reports that he has had diarrhea and abdominal distention/pain for a long time. Also reports continued \"belching,\" which is minimally relieved by Baclofen. Net I/O: -902 mL    Medications:  Reviewed    Infusion Medications    dextrose       Scheduled Medications    sacubitril-valsartan  1 tablet Oral BID    metoprolol succinate  100 mg Oral BID    potassium replacement protocol   Other RX Placeholder    [START ON 10/10/2020] therapeutic multivitamin-minerals  1 tablet Oral Daily    scopolamine  1 patch Transdermal Q72H    insulin lispro  0-18 Units Subcutaneous 4x Daily AC & HS    insulin glargine  23 Units Subcutaneous Nightly    bisacodyl  10 mg Rectal Daily    famotidine  20 mg Oral BID    furosemide  60 mg Intravenous BID    piperacillin-tazobactam  3.375 g Intravenous Q8H    aspirin  81 mg Oral Daily    atorvastatin  10 mg Oral TID    baclofen  10 mg Oral Daily    pregabalin  75 mg Oral BID    tiotropium  2 puff Inhalation Daily    sodium chloride flush  10 mL Intravenous 2 times per day    enoxaparin  40 mg Subcutaneous Daily    busPIRone  5 mg Oral TID    finasteride  5 mg Oral Daily    modafinil  100 mg Oral Daily    budesonide-formoterol  2 puff Inhalation BID     PRN Meds: dicyclomine, glucose, dextrose, glucagon (rDNA), dextrose, potassium chloride **OR** potassium alternative oral replacement **OR** potassium chloride, albuterol, albuterol sulfate HFA, sodium chloride flush, acetaminophen **OR** acetaminophen, polyethylene glycol, promethazine **OR** ondansetron      Intake/Output Summary (Last 24 hours) at 10/9/2020 1425  Last data filed at 10/9/2020 0505  Gross per 24 hour   Intake 32.52 ml   Output 750 ml   Net -717.48 ml       Diet:  DIET GENERAL; Carb Control: 3 carb choices (45 gms)/meal; Low Sodium (2 GM);  Daily Fluid Restriction: 2000 ml    Exam:  BP (!) 180/93   Pulse 109   Temp 98 °F (36.7 °C) (Oral)   Resp 16   Ht 5' 7\" (1.702 m)   Wt 218 lb 3.2 oz (99 kg)   SpO2 96%   BMI 34.17 kg/m²     General appearance: No apparent distress, appears stated age and cooperative. HEENT: Pupils equal, round, and reactive to light. Conjunctivae/corneas clear. Neck: Supple, with full range of motion. No jugular venous distention. Trachea midline. Respiratory:  Normal respiratory effort. Clear to auscultation, bilaterally without Rales/Wheezes/Rhonchi. Cardiovascular: Tachycardic and regular rhythm with normal S1/S2 without murmurs, rubs or gallops. Abdomen: moderate abdominal distention, tender to palpation in all quadrants, normoactive bowel sounds. No ascites appreciated. Musculoskeletal: passive and active ROM x 4 extremities. Skin: Skin color, texture, turgor normal.    Neurologic:  Neurovascularly intact without any focal sensory/motor deficits. Cranial nerves: II-XII intact, grossly non-focal.  Psychiatric: Alert and oriented, thought content appropriate  Capillary Refill: Brisk,< 3 seconds   Peripheral Pulses: +2 palpable, equal bilaterally       Labs:   Recent Labs     10/07/20  0730 10/08/20  0736 10/09/20  0607   WBC 9.8 13.6* 13.9*   HGB 15.5 16.3 16.5   HCT 50.4 52.6* 53.0*    161 162     Recent Labs     10/08/20  0736 10/08/20  1923 10/09/20  0607   * 143 145   K 3.5 3.3* 3.3*    99 102   CO2 26 27 25   BUN 31* 23* 25*   CREATININE 1.2 1.4* 1.3*   CALCIUM 8.9 8.9 9.0     No results for input(s): AST, ALT, BILIDIR, BILITOT, ALKPHOS in the last 72 hours. No results for input(s): INR in the last 72 hours. No results for input(s): Les Suellen in the last 72 hours. No results for input(s): PROCAL in the last 72 hours. Microbiology:  No new microbiology.     Urinalysis:      Lab Results   Component Value Date    NITRU NEGATIVE 10/08/2020    WBCUA 10-15 10/08/2020    BACTERIA NONE SEEN 10/08/2020    RBCUA 3-5 10/08/2020    BLOODU NEGATIVE 10/08/2020    SPECGRAV >1.030 10/08/2020    GLUCOSEU NEGATIVE 10/03/2020       Radiology:  XR ABDOMEN (KUB) (SINGLE AP VIEW)   Final Result   Paralytic ileus involving predominantly the colon. **This report has been created using voice recognition software. It may contain minor errors which are inherent in voice recognition technology. **         Final report electronically signed by Dr. Carrie Santiago on 10/9/2020 11:10 AM      XR CHEST PORTABLE   Final Result   Impression:   Support devices in place. No focal lung consolidation. This document has been electronically signed by: Radha Fry MD on    10/08/2020 06:27 AM         CTA HEAD W WO CONTRAST (CODE STROKE NIHSS 4 or Above)   Final Result      CT HEAD WO CONTRAST   Final Result      CTA NECK W WO CONTRAST (CODE STROKE NIHSS 4 or Above)   Final Result   Patent neck CTA. Thyroid isthmus nodule. Consider nonemergent thyroid ultrasound. This document has been electronically signed by: Angelina Berry MD on 10/08/2020 03:10 AM      All CT scans at this facility use dose modulation, iterative    reconstruction, and/or weight-based   dosing when appropriate to reduce radiation dose to as low as reasonably    achievable. Carotid stenosis and measurements are in accordance with the NASCET    criteria. XR CHEST PORTABLE   Final Result   Impression:   Significant improvement in bilateral consolidations. This document has been electronically signed by: Radha Fry MD on    10/07/2020 07:26 AM         XR CHEST PORTABLE   Final Result   Similar bilateral airspace disease. Possible left pleural effusion. Support devices as above. This document has been electronically signed by: Cecilia Castellano MD on    10/06/2020 03:30 AM         XR CHEST PORTABLE   Final Result   Persistent bilateral infiltrates similar in appearance to prior study. Continued progress imaging is advised. Support lines and tubes in satisfactory position. **This report has been created using voice recognition software. It may contain minor errors which are inherent in voice recognition technology. **      Final report electronically signed by Dr. Nelida Hay on 10/5/2020 2:34 PM      XR CHEST PORTABLE   Final Result   Impression:   1. Tubes and line are as described above. 2.  Bilateral diffuse airspace disease appears similar to the prior study    and may represent pulmonary edema versus atypical pneumonia      This document has been electronically signed by: Dayan Martell MD on    10/04/2020 04:28 AM         CTA CHEST [address]   Final Result   1. The distal tip of the endotracheal tube is 3.3 cm above the level of the ila. 2. The esophageal tube extends to the stomach. 3. There is a right jugular central venous catheter with the distal tip within the superior vena cava. 4. There are small bilateral pleural effusions, right greater than left. There is infiltrate throughout both lung fields with additional consolidation within the mid and lower chest. Clinical management is recommended. Follow-up chest radiographs are    also recommended to confirm complete resolution. 5. There are mediastinal and hilar lymph nodes which may be reactive related to the inflammatory process. A follow-up CT examination of the chest with intravenous contrast in 3 months is recommended to confirm stability/resolution. 6. There is no pulmonary embolus. **This report has been created using voice recognition software. It may contain minor errors which are inherent in voice recognition technology. **      Final report electronically signed by Dr. Ryland Hooker on 10/3/2020 8:14 PM      XR CHEST PORTABLE   Final Result   1. The distal tip of the endotracheal tube is 5.2 cm above the level of the ila.       2. The esophageal tube extends into the stomach and off the inferior aspect of the image. 3. There is a right subclavian central venous catheter with the distal tip overlying the superior vena cava. There is no pneumothorax. 4. There are infiltrates throughout both lung fields with a predominantly perihilar distribution. There is no pleural effusion. **This report has been created using voice recognition software. It may contain minor errors which are inherent in voice recognition technology. **      Final report electronically signed by Dr. Mahi Cardenas on 10/3/2020 7:27 PM          DVT prophylaxis: [x] Lovenox                                 [] SCDs                                 [] SQ Heparin                                 [] Encourage ambulation           [] Already on Anticoagulation     Code Status: Full Code    Tele:   [x] yes             [] no    Active Hospital Problems    Diagnosis Date Noted    Respiratory failure [facility]) [J96.90] 10/03/2020       Electronically signed by Girma Pritchett on 10/9/2020 at 2:25 PM     **This is a Medical/ PA/ APRN Student Note and is charted for educational purposes. The non-physician staff attested note is not to be used for billing purposes or to guide patient care. Please see the physician modifications/ attestation for treatment plan/suggestions. This note has been reviewed and feedback has been provided to the student.  *

## 2020-10-09 NOTE — PLAN OF CARE
[address] [facility] [place] THERAPY MISSED TREATMENT NOTE  [facility] ICU STEPDOWN TELEMETRY 4K  4K-17/017-A      Date: 10/9/2020  Patient Name: Mallika Thompson        CSN: [id number]   : 1955  (76 y.o.)  Gender: male   Referring Practitioner: Dr. Km Shields MD  Diagnosis: Respiratory Failure         REASON FOR MISSED TREATMENT:  Per conversation with PT, PRAKASH Campos asking to hold therapy session at this time, secondary to patient \"feeling ill\".  Will re-attempt as time allows and pt is appropriate

## 2020-10-09 NOTE — PROGRESS NOTES
Assessment and Plan:        1. Acute respiratory failure- chf possible pneumonia- add   Entresto. Consult Cardio. Had cath 1.20 no cad  2. hbp- pt takes at home- 120-170- will push Entresto  3. IBS with diarrhea- abdomen shows much gas- try rectal tube- possible metamucil later  4. diab mellitus- monitor and adjust insulin as needed    CC:  sob  HPI:  Pt with hx dm, hbp presented with several weeks of sob and was intubated in ER. He needed reintubation. Transferred to floor 10.8. ROS (12 point review of systems completed. Pertinent positives noted.  Otherwise ROS is negative) :   PMH:  Per HPI  SHX:  , former smoker  FHX: unknown  Allergies: See above    Medications:     dextrose        sacubitril-valsartan  1 tablet Oral BID    metoprolol succinate  100 mg Oral BID    potassium replacement protocol   Other RX Placeholder    [START ON 10/10/2020] therapeutic multivitamin-minerals  1 tablet Oral Daily    scopolamine  1 patch Transdermal Q72H    insulin lispro  0-18 Units Subcutaneous 4x Daily AC & HS    insulin glargine  23 Units Subcutaneous Nightly    bisacodyl  10 mg Rectal Daily    famotidine  20 mg Oral BID    furosemide  60 mg Intravenous BID    piperacillin-tazobactam  3.375 g Intravenous Q8H    aspirin  81 mg Oral Daily    atorvastatin  10 mg Oral TID    baclofen  10 mg Oral Daily    pregabalin  75 mg Oral BID    tiotropium  2 puff Inhalation Daily    sodium chloride flush  10 mL Intravenous 2 times per day    enoxaparin  40 mg Subcutaneous Daily    busPIRone  5 mg Oral TID    finasteride  5 mg Oral Daily    modafinil  100 mg Oral Daily    budesonide-formoterol  2 puff Inhalation BID       Vital Signs:   BP (!) 179/101   Pulse 109   Temp 98 °F (36.7 °C) (Oral)   Resp 16   Ht 5' 7\" (1.702 m)   Wt 218 lb 3.2 oz (99 kg)   SpO2 99%   BMI 34.17 kg/m²      Intake/Output Summary (Last 24 hours) at 10/9/2020 1636  Last data filed at 10/9/2020 1430  Gross per 24 hour Intake 82.52 ml   Output 2250 ml   Net -2167.48 ml        Physical Examination: General appearance - overweight and ill-appearing  Mental status - alert, oriented to person, place, and time  Neck - supple, no significant adenopathy, no JVD, or carotid bruits  Chest - clear to auscultation, no wheezes, rales or rhonchi, symmetric air entry  Heart - normal rate, regular rhythm, normal S1, S2, no murmurs, rubs, clicks or gallops  Abdomen - distended, tympanitic, high pitch bowel sounds  Neurological - alert, oriented, normal speech, no focal findings or movement disorder noted  Musculoskeletal - no joint tenderness, deformity or swelling  Extremities - no pedal edema noted  Skin - normal coloration and turgor, no rashes, no suspicious skin lesions noted    Data: (All radiographs, tracings, PFTs, and imaging are personally viewed and interpreted unless otherwise noted).     Reviewed labs, cxrs      Electronically signed by Beny Savage MD on 10/9/2020 at 4:36 PM

## 2020-10-09 NOTE — PLAN OF CARE
Problem: Impaired respiratory status  Goal: Will be able to breathe spontaneously, without ventilator support  Description: Will be able to breathe spontaneously, without ventilator support  10/9/2020 0801 by Radha Herr RCP  Outcome: Completed  10/8/2020 2237 by Héctor Khalil RN  Outcome: Ongoing  Note: Patient on room air at this time and tolerating well.

## 2020-10-09 NOTE — PROGRESS NOTES
Consult received. Chart reviewed. Await OT eval. Await MRI of the Brain and Cervical spine to be completed per recommendations from Dr. Hector Rothman  And further recommendations from neurology. Message left for Chilango CM  In regards to no MRI ordered or neurology consult.

## 2020-10-09 NOTE — PROGRESS NOTES
[address] [place] [id number]  PHYSICAL THERAPY MISSED TREATMENT NOTE  ACUTE CARE  STR[facility] ICU STEPDOWN TELEMETRY 4K              Missed Treatment  RN Beth asking to hold PT session at this time, secondary to patient \"feeling ill\". Will re-attempt as time allows and pt is appropriate.

## 2020-10-09 NOTE — CARE COORDINATION
10/9/20, 2:28 PM EDT    [address] [facility] day: 6  Location: [facility]- Reason for admit: Respiratory failure [facility]) [J96.90]   Procedure:   10/3 Intubated  10/3 CVC   10/3 CTA Chest: There are small bilateral pleural effusions, right greater than left. There is infiltrate throughout both lung fields with additional consolidation within the mid and lower chest  10/5 Extubated/Reintubated  10/5 Echo EF 25-30%  10/8 Extubated  10/9 XR Abdomen  Paralytic Ileus  Treatment Plan of Care: client admitted for Respiratory Failure to ICU (ventilator there)  Barriers to Discharge: IV Diuretics continued, rectal tube today, levated WBC; monitor. BP up to 180/s-200/s today; monitor. Neurology recommendations for MRI Brain; none pending; nsg will f/u with Dr. Oly Mireles; collaborated with Gulshan Conrad RN, no neuro deficits today per nsg report, Cardiology consulted today for CHF; is candidate for [facility]; monitor.  Update: no MRI needed per Attending; updated WILLIE Serrano Coordinator    PCP: KRYSTAL Vyas CNP  Readmission Risk Score: 21%  Patient Goals/Plan/Treatment Preferences: lives alone, therapy recommends SNF, Rehab eval pending; await Physiatry recommendations;  referral, monitor for possible [facility] needs; await Cardiology input

## 2020-10-09 NOTE — PLAN OF CARE
Problem: Restraint Use - Nonviolent/Non-Self-Destructive Behavior:  Goal: Absence of restraint indications  Description: Absence of restraint indications  10/8/2020 0958 by Eros Pierre RN  Outcome: Completed  Note: Restraints removed prior to my shift. No longer needed. Patient not intubated or pulling at tubes/lines. Problem: Restraint Use - Nonviolent/Non-Self-Destructive Behavior:  Goal: Absence of restraint-related injury  Description: Absence of restraint-related injury  10/8/2020 0958 by Eros Pierre RN  Outcome: Completed  Note: No restraint-related injury noted     Problem: Urinary Elimination:  Goal: Signs and symptoms of infection will decrease  Description: Signs and symptoms of infection will decrease  10/8/2020 2237 by Pablito Horvath RN  Outcome: Ongoing  Note: Patient afebrile. No s/s of infection at this time. Zosyn for antibiotic. Problem: Urinary Elimination:  Goal: Complications related to the disease process, condition or treatment will be avoided or minimized  Description: Complications related to the disease process, condition or treatment will be avoided or minimized  10/8/2020 2237 by Pablito Horvath RN  Outcome: Ongoing     Problem: Infection - Central Venous Catheter-Associated Bloodstream Infection:  Goal: Will show no infection signs and symptoms  Description: Will show no infection signs and symptoms  10/8/2020 2237 by Pablito Horvath RN  Outcome: Completed     Problem: Skin Integrity:  Goal: Will show no infection signs and symptoms  Description: Will show no infection signs and symptoms  10/8/2020 2237 by Pablito Horvath RN  Outcome: Completed     Problem: Skin Integrity:  Goal: Absence of new skin breakdown  Description: Absence of new skin breakdown  10/8/2020 2237 by Pablito Horvath RN  Outcome: Ongoing  Note: Turning every two hours to prevent skin breakdown. Patient and family educated on the importance of frequent turns to prevent skin breakdown.         Problem: Falls - Risk of:  Goal: Will remain free from falls  Description: Will remain free from falls  10/8/2020 2237 by Keyona Peoples RN  Outcome: Ongoing  Note: Patient free from falls thus far this shift. Bed alarm on, call light within reach, hourly rounding being completed. Problem: Falls - Risk of:  Goal: Absence of physical injury  Description: Absence of physical injury  10/8/2020 2237 by Keyona Peoples RN  Outcome: Ongoing  Note: Free from physical injury at this time. Problem: Nutrition  Goal: Optimal nutrition therapy  10/8/2020 2237 by Keyona Peoples RN  Outcome: Ongoing  Note: Patient has poor appetite. Encouraging oral intake. 2 L fluid restriction. Problem: Discharge Planning:  Goal: Discharged to appropriate level of care  Description: Discharged to appropriate level of care  Outcome: Ongoing  Note: Patient plans to return home with wife at discharge and no needs voiced at this time. Patient working with social work for discharge planning. Problem: Pain:  Description: Pain management should include both nonpharmacologic and pharmacologic interventions. Goal: Pain level will decrease  Description: Pain level will decrease  Outcome: Ongoing  Note: Pain Assessment: 0-10  Pain Level: 2   Patient's Stated Pain Goal: No pain   Is pain goal met at this time? No             Problem: Pain:  Description: Pain management should include both nonpharmacologic and pharmacologic interventions. Goal: Control of acute pain  Description: Control of acute pain  Outcome: Ongoing     Problem: Pain:  Description: Pain management should include both nonpharmacologic and pharmacologic interventions.   Goal: Control of chronic pain  Description: Control of chronic pain  Outcome: Ongoing     Problem: Impaired respiratory status  Goal: Will be able to breathe spontaneously, without ventilator support  Description: Will be able to breathe spontaneously, without ventilator support  10/8/2020 2237 by Ronda Kay Fidelia Sewell, RN  Outcome: Ongoing  Note: Patient on room air at this time and tolerating well. Care plan reviewed with patient and family. Patient and family verbalize understanding of the plan of care and contribute to goal setting.

## 2020-10-09 NOTE — CONSULTS
[facility]    Patient's Name/Date of Birth: Brandy Chappell / 1955 (73 y.o.)    Date: October 9, 2020     Referring Provider: Margie Bishop MD    CHIEF COMPLAINT: dyspnea      HPI: This is a pleasant 76 y.o. male who presents to Cardiology for consultation of possible heart failure with reduced ejection fraction. The patient has a past medical history of HTN, HLD, DM II, COPD, sleep apnea, and anxiety. The patient states that he began having shortness of breath several months to a year ago and that the shortness of breath is associated with hiccups. The patient states that his shortness of breath is only worrisome when he begins to hiccup. The patient states that the hiccups occur randomly and last for several minutes at onset. The patient denies shortness of breath when lying flat or a relief of shortness of breath when reclining. The patient states that there is no pain associated with his shortness of breath. Additionally, the patient mentioned that he has a history of bronchitis and hears a wheeze on occasion when he is breathing. The patient also mentioned that he has a history of sleep apnea. The patient states that when he begins to have breathing difficulties he uses his CPAP and nebulizer which relieves it acutely. The patient also denied chest pain of any kind, lightheadedness, dizziness, and episodes/feelings of syncope. The patient denies a history of swelling in the lower extremities but does state that his belly is tender when he lies flat. The patient denies a history of alcohol use/abuse. Pump: Ejection fraction is visually estimated at 25-30%. There was severe global hypokinesis of the left ventricle. Valves: Aortic valve leaflets are somewhat thickened. Myxomatotic degeneration of mitral valve. Calcification of the mitral valve noted. Arteries: No hx of CAD. Cardiac cath done   Rhythm: Sinus tachycardia. Possible Left atrial enlargement.  LVH with repolarization abnormality. ST elevation consider inferior injury or acute infarct. Consider right ventricular involvement in acute inferior infarct. ST elevation now present in Inferior leads. Non-specific change in ST segment in Anterior leads. Peripheral vascular disease: No hx of PAD      Echo:   Results for orders placed during the hospital encounter of 02/24/20   ECHO Complete 2D W Doppler W Color    Narrative Transthoracic Echocardiography Report (TTE)     Demographics      Patient Name    Tracy Zhu Gender                Male      MR #            [id number]       Race                  Black                                      Ethnicity      Account #       [de-identified]       Room Number      Accession       [id number]       Date of Study         02/24/2020   Number      Date of Birth   1955      Referring Physician   Martina Rothman MD                                                         [facility] CNP      Age             71 year(s)      Hoa Bello, [facility]                                      Interpreting          Martina Rothman MD                                   Physician     Procedure    Type of Study      TTE procedure:ECHOCARDIOGRAM COMPLETE 2D W DOPPLER W COLOR. Procedure Date  Date: 02/24/2020 Start: 03:01 PM    Study Location: Echo Lab  Technical Quality: Limited visualization due to body habitus. Indications:Shortness of breath. Additional Medical History:Chest pain, Shortness of breath, Diabetes, COPD,  Hypertension, Hyperlipidemia    Patient Status: Routine    Height: 70 inches Weight: 217 pounds BSA: 2.16 m^2 BMI: 31.14 kg/m^2    BP: 138/82 mmHg    Allergies    - No Known Allergies. Conclusions      Summary   Ejection fraction is visually estimated at 35-40%. Mildly increased left ventricle wall thickness.       Signature      ----------------------------------------------------------------   Electronically signed by Martina Rothman MD [id number] 3.375 g at 10/09/20 0851    albuterol sulfate  (90 Base) MCG/ACT inhaler 2 puff  2 puff Inhalation Q6H PRN Azael Aid, APRN - CNP   2 puff at 10/06/20 0747    aspirin EC tablet 81 mg  81 mg Oral Daily Azael Aid, APRN - CNP   81 mg at 10/09/20 1107    atorvastatin (LIPITOR) tablet 10 mg  10 mg Oral TID Azael Aid, APRN - CNP   10 mg at 10/09/20 1350    baclofen (LIORESAL) tablet 10 mg  10 mg Oral Daily Azael Aid, APRN - CNP   10 mg at 10/09/20 1113    pregabalin (LYRICA) capsule 75 mg  75 mg Oral BID Azael Aid, APRN - CNP   75 mg at 10/09/20 1112    tiotropium (SPIRIVA RESPIMAT) 2.5 MCG/ACT inhaler 2 puff  2 puff Inhalation Daily Azael Aid, APRN - CNP   2 puff at 10/09/20 0755    sodium chloride flush 0.9 % injection 10 mL  10 mL Intravenous 2 times per day Azael Aid, APRN - CNP   10 mL at 10/09/20 0930    sodium chloride flush 0.9 % injection 10 mL  10 mL Intravenous PRN Azael Aid, APRN - CNP        acetaminophen (TYLENOL) tablet 650 mg  650 mg Oral Q6H PRN Azael Aid, APRN - CNP   650 mg at 10/08/20 0240    Or    acetaminophen (TYLENOL) suppository 650 mg  650 mg Rectal Q6H PRN Azael Aid, APRN - CNP        polyethylene glycol (GLYCOLAX) packet 17 g  17 g Oral Daily PRN Azael Aid, APRN - CNP   17 g at 10/07/20 0926    promethazine (PHENERGAN) tablet 12.5 mg  12.5 mg Oral Q6H PRN Azael Aid, APRN - CNP        Or    ondansetron (ZOFRAN) injection 4 mg  4 mg Intravenous Q6H PRN Azael Aid, APRN - CNP   4 mg at 10/08/20 2208    enoxaparin (LOVENOX) injection 40 mg  40 mg Subcutaneous Daily Azael Aid, APRN - CNP   40 mg at 10/09/20 1119    busPIRone (BUSPAR) tablet 5 mg  5 mg Oral TID Azael Aid, APRN - CNP   5 mg at 10/09/20 1351    finasteride (PROSCAR) tablet 5 mg  5 mg Oral Daily Azael Aid, APRN - CNP   5 homatropine 5 % ophthalmic solution 1 drop every hour     Historical Provider, MD   pregabalin (LYRICA) 75 MG capsule Take 75 mg by mouth 2 times daily. [place] Historical Provider, MD   metFORMIN (GLUCOPHAGE-XR) 500 MG extended release tablet Take 500 mg by mouth 2 times daily     Historical Provider, MD   metoprolol (LOPRESSOR) 100 MG tablet Take 100 mg by mouth 2 times daily    Historical Provider, MD   modafinil (PROVIGIL) 100 MG tablet Take 100 mg by mouth daily. [place] Historical Provider, MD   omeprazole (PRILOSEC) 20 MG delayed release capsule Take 20 mg by mouth every other day     Historical Provider, MD   pioglitazone (ACTOS) 30 MG tablet Take 30 mg by mouth daily    Historical Provider, MD   Respiratory Therapy Supplies (NEBULIZER/ADULT MASK) KIT by Does not apply route    Historical Provider, MD   Testosterone (TESTOPEL) 75 MG PLLT by Implant route. [place] Historical Provider, MD   insulin glargine (TOUJEO SOLOSTAR) 300 UNIT/ML injection pen Inject 75 Units into the skin 2 times daily     Historical Provider, MD   blood glucose test strips (TRUE METRIX BLOOD GLUCOSE TEST) strip 1 each by In Vitro route daily As needed.     Historical Provider, MD   albuterol sulfate  (90 Base) MCG/ACT inhaler Inhale 2 puffs into the lungs every 6 hours as needed for Wheezing    Historical Provider, MD   Liraglutide (VICTOZA) 18 MG/3ML SOPN SC injection Inject 1.2 mg into the skin daily    Historical Provider, MD   Scheduled Meds:   sacubitril-valsartan  1 tablet Oral BID    metoprolol succinate  100 mg Oral BID    potassium replacement protocol   Other RX Placeholder    [START ON 10/10/2020] therapeutic multivitamin-minerals  1 tablet Oral Daily    scopolamine  1 patch Transdermal Q72H    insulin lispro  0-18 Units Subcutaneous 4x Daily AC & HS    insulin glargine  23 Units Subcutaneous Nightly    bisacodyl  10 mg Rectal Daily    famotidine  20 mg Oral BID    furosemide  60 mg Intravenous BID    piperacillin-tazobactam  3.375 g Intravenous Q8H    aspirin  81 mg Oral Daily    atorvastatin  10 mg Oral TID    baclofen  10 mg Oral Daily    pregabalin  75 mg Oral BID    tiotropium  2 puff Inhalation Daily    sodium chloride flush  10 mL Intravenous 2 times per day    enoxaparin  40 mg Subcutaneous Daily    busPIRone  5 mg Oral TID    finasteride  5 mg Oral Daily    modafinil  100 mg Oral Daily    budesonide-formoterol  2 puff Inhalation BID     Continuous Infusions:   dextrose       PRN Meds:.dicyclomine, glucose, dextrose, glucagon (rDNA), dextrose, potassium chloride **OR** potassium alternative oral replacement **OR** potassium chloride, albuterol, albuterol sulfate HFA, sodium chloride flush, acetaminophen **OR** acetaminophen, polyethylene glycol, promethazine **OR** ondansetron    No Known Allergies  Family History   Problem Relation Age of Onset    Colon Cancer Neg Hx     Colon Polyps Neg Hx     Liver Cancer Neg Hx     Esophageal Cancer Neg Hx     Rectal Cancer Neg Hx     Stomach Cancer Neg Hx      Social History     Socioeconomic History    Marital status:      Spouse name: Not on file    Number of children: Not on file    Years of education: Not on file    Highest education level: Not on file   Occupational History    Not on file   Social Needs    Financial resource strain: Not on file    Food insecurity     Worry: Not on file     Inability: Not on file   [employer] needs     Medical: Not on file     Non-medical: Not on file   Tobacco Use    Smoking status: Former Smoker     Packs/day: 1.00     Years: 15.00     Pack years: 15.00     Types: Cigarettes     Start date: 1977     Last attempt to quit: 1992     Years since quittin.7    Smokeless tobacco: Never Used   Substance and Sexual Activity    Alcohol use: Yes     Comment: occ    Drug use: Never    Sexual activity: Not on file   Lifestyle    Physical activity     Days per week: Not on file Minutes per session: Not on file    Stress: Not on file   Relationships    Social connections     Talks on phone: Not on file     Gets together: Not on file     Attends Rastafarian service: Not on file     Active member of club or organization: Not on file     Attends meetings of clubs or organizations: Not on file     Relationship status: Not on file    Intimate partner violence     Fear of current or ex partner: Not on file     Emotionally abused: Not on file     Physically abused: Not on file     Forced sexual activity: Not on file   Other Topics Concern    Not on file   Social History Narrative    Not on file     ROS:   Constitutional: Denies any recent wt change. Eyes:  Denies any blurring or double vision, no glaucoma  Ears/Nose/Mouth/Throat:  Denies any chronic sinus/rhinitis, bleeding gums  Cardiovascular:  As described above. Respiratory:  As described above. Genitourinary:  Denies difficulty with urination and kidney stones  Gastrointestinal:  Denies any chronic problems with abdominal pain, nausea, vomiting or diarrhea  Musculoskeletal:  Denies any joint pain, back pain, or difficulty walking  Integumentary:  Denies any rash  Neurological:  No numbness or tingling  Endocrine:  Denies any polydipsia. Hematologic/Lymphatic:  Denies any hemorrhage or lymphatic drainage problems.   Labs:  CBC:   Recent Labs     10/07/20  0730 10/08/20  0736 10/09/20  0607   WBC 9.8 13.6* 13.9*   HGB 15.5 16.3 16.5   HCT 50.4 52.6* 53.0*   MCV 86.3 86.2 84.8    161 162     BMP:   Recent Labs     10/07/20  0400 10/08/20  0736 10/08/20  1923 10/09/20  0607    148* 143 145   K 3.6 3.5 3.3* 3.3*    110 99 102   CO2 26 26 27 25   BUN 21 31* 23* 25*   CREATININE 1.3* 1.2 1.4* 1.3*   MG 2.1 2.4  --  1.9     Accucheck Glucoses:   Recent Labs     10/08/20  1634 10/08/20  2215 10/09/20  0117 10/09/20  0902 10/09/20  1137   POCGLU 123* 154* 187* 195* 254*     Cardiac Enzymes: No results for input(s): CKTOTAL, CKMB, CKMBINDEX, TROPONINI in the last 72 hours. PT/INR: No results for input(s): PROTIME, INR in the last 72 hours. APTT: No results for input(s): APTT in the last 72 hours.   Liver Profile:  Lab Results   Component Value Date    AST 28 10/03/2020    ALT 29 10/03/2020    BILIDIR 0.3 10/03/2020    BILITOT 0.9 10/03/2020    ALKPHOS 129 10/03/2020     Lab Results   Component Value Date    CHOL 199 07/15/2020    HDL 36 07/15/2020    TRIG 504 07/15/2020     TSH:   Lab Results   Component Value Date    TSH 0.656 10/04/2020     UA:   Lab Results   Component Value Date    COLORU YELLOW 10/08/2020    PHUR 5.0 10/08/2020    LABCAST NONE SEEN 10/08/2020    LABCAST NONE SEEN 10/08/2020    WBCUA 10-15 10/08/2020    RBCUA 3-5 10/08/2020    MUCUS NOT REPORTED 03/09/2020    TRICHOMONAS NOT REPORTED 03/09/2020    YEAST NONE SEEN 10/08/2020    BACTERIA NONE SEEN 10/08/2020    SPECGRAV >1.030 10/08/2020    LEUKOCYTESUR TRACE 10/08/2020    LEUKOCYTESUR SMALL 10/03/2020    UROBILINOGEN 0.2 10/08/2020    BILIRUBINUR NEGATIVE 10/08/2020    BLOODU NEGATIVE 10/08/2020    GLUCOSEU NEGATIVE 10/03/2020    AMORPHOUS NOT REPORTED 03/09/2020         Physical Exam:  Vitals:    10/09/20 1345   BP: (!) 180/93   Pulse:    Resp: 16   Temp:    SpO2:         Intake/Output Summary (Last 24 hours) at 10/9/2020 1553  Last data filed at 10/9/2020 1430  Gross per 24 hour   Intake 82.52 ml   Output 2250 ml   Net -2167.48 ml      General:  No acute distress  Neck: Supple, no JVD, no carotid bruits  Heart: Regular rhythm normal S1 and S2, no rubs, murmurs or gallops  Lungs: clear to ascultation (anteriorly) no rales, wheezes, or rhonchi  Abdomen: positive bowel sounds, soft, non-tender, non-distended, no bruits, no masses  Extremities:no clubbing, cyanosis or edema  Neurologic: alert and oriented x 3, cranial nerves 2-12 grossly intact, motor and sensory intact, moving all extremities  Skin: No rashes    Assessment/Plan:  Multifactorial resp failure with PNA/CHF  Acute exacerbation of CHF with an EF of 25% (previously EF Calculated: 39.2 % in February)   Continue LOPRESSOR 100 mg  Increase ENTRESTO 49/51 mg tablets    Thank you for allowing us to participate in the care of this patient. Please do not hesitate to call us with questions. Electronically signed by Carlos Rouse on 10/9/2020 at 3:53 PM       Attending Supervising [de-identified] Attestation Statement  I performed a history and physical examination on the patient and discussed the management with the resident physician/med student. I reviewed and agree with the findings and plan as documented in the resident's/med student's note except for as noted below. 75 yo AAF DM II, with hx of NICM, recent cath 1/2020 without obstruction who presented with acute resp failure that improved with diuresis. Concern for PNA/sepsis and HTN emergency. Repeat EF showing worsening in the acute setting. Required intubation and ICU care as well. Diffuse ST elevation seen without Q-waves or reciprocal changes and no associated syndrome of chest discomfort. Possible pericarditis. Trop minimally elevated with a flat curve. He is very comfortable without any distress. No significant valvular disease. Exam non-focal.  Favor acute decompensation in the setting of HTN emergency and NICM due to rapid improvement with diuretics. Continue diuretics, add Aldactone, increase Entresto, will need LifeVest, switch Lasix to 40 mg q day PO. Re-check ECG today. Would not proceed with cath at this time given recent cath and acute issues that explain the drop in EF. Will repeat TTE in 3 months to evaluate improvement on OMT to understand need for ICD. Infection issues per primary. Consider adding Pa Pinto as outpatient. If syndrome changes, then will reassess for invasive evaluation.   Further recommendations based on results and clinical course    Electronically signed by Deanne Ham MD on 10/9/20 at 8:05 PM EDT      Interventional Cardiology - [facility]

## 2020-10-10 NOTE — PROGRESS NOTES
**This is a Medical/ PA/ APRN Student Note and is charted for educational purposes. The non-physician staff attested note is not to be used for billing purposes or to guide patient care. Please see the physician modifications/ attestation for treatment plan/suggestions. This note has been reviewed and feedback has been provided to the student. *     Hospitalist Progress Note    Patient:  Shelly Aguila      Unit/Bed:-17/017-A    YOB: 1955    MRN: [id number]       Acct: [de-identified]     PCP: KRYSTAL Whitmore - [facility]    Date of Admission: 10/3/2020    Assessment/Plan:    1. Hypertension, improved: -160s overnight. Patient tolerating Entresto well. No evidence of end organ damage such as headaches, dizziness, renal failure, etc.   Adjust antihypertensive regimen as follows: trial 150 mg metoprolo succinate now. If BP remains elevated, will increase Entresto as well. Continue to hold home Losartan/HCTZ. 2. Acute Respiratory Failure, resolved: Extubated 10/08 without apparent complications. Patient now on nasal cannula PRN during hiccuping episodes. Monitor and supplement O2 to maintain O2>93%. 3. Acute on Chronic Congestive Heart Failure: EF 10/5 of 25-30%, down from EF 35-40% on 02/2020. Cardiac cath 01/2020 showed no significant CAD. Will hold IV Lasix for 1d. Appreciate cardiology input. 4. Bilateral Pneumonia vs Pulmonary Edema: CXR and CT on admission 10/3 showing bilateral diffuse opacities and infiltrate. CXR 10/8 shows resolution of bilateral infiltrates; rapid improvement more suggestive of CHF/pulmonary edema vs pneumonia. Molecular PNA panel on 10/5 positive for staph aureus. S/p decadron and Zosyn. 5. Sepsis, resolved: Patient meeting 1/4 SIRS criteria, as above pulmonary edema seems more likely vs pneumonia. S/p Zosyn. 6. Diarrhea, Abdominal Pain: Has h/o IBS,diarrhea, and abdominal pain/distention. Received Metamucil, which improved his symptoms.   7. Paralytic Ileus: Abdominal XR showing moderate gaseous distention of almost entire colon consistent with colonic ileus and suggestive of mild small bowel distention. Rectal tube now removed. 8. Type 2 DM: last A1C 07/2020 is 8.7. POCG 230-250s in last 24 hours. Give additional 20 units Lantus in AM. On high dose sliding scale. Patient's home regimen is Glargine 75 BID and Novolog sliding scale. Monitor closely. 9. UTI: urine cx 10/3 positive for E.Coli. S/p Zosyn. Remove [place]. 10. Acute Kidney Injury, stable: BUN 28 and Cr 1.4. Likely secondary to CHF exacerbation. Hold Lasix today. 11. Hypokalemia: K 3.4, replete per protocol. Repeat BMP in AM.   12. H/O polycythemia: Hgb 18.8. Expected discharge date:  10/11/20    Disposition:    [x] Home       [] TCU       [] Rehab       [] Psych       [] SNF       [] [place]       [] Other-    Chief Complaint: shortness of breath    Hospital Course:  10/3: presented to ED with 2 weeks of increased dyspnea. Satting 76% on RA, failed trials of nonrebreather and BiPAP, intubated and ventilated. Admitted to ICU.  on arrival. CXR and CT with bilateral infiltrates. 10/4: started on Zosyn and decadron. Urine cx positive for E.Coli. 10/5: Extubated, and later re-intubated. Started Lasix. ECHO with EF of 25-30%. 10/8: Extubated in PM, transferred to floor. 10/9: loose stool and increasing abdominal distention. KUB showing colonic ileus. Max  in AM.   10/10: Abdominal symptoms improved. -170s. Adjust antihypertensives and insulin regimen. Subjective (past 24 hours):   No acute events overnight. Patient seen sitting in chair. Denies any headache, dizziness, or other new symptoms. Patient reports that he \" feels great\" and the diarrhea and abdominal bloating are improved. Endorses intermittent hiccups, much improved from yesterday.     Medications:  Reviewed    Infusion Medications    dextrose       Scheduled Medications    metoprolol succinate 100 mg Oral BID    potassium replacement protocol   Other RX Placeholder    therapeutic multivitamin-minerals  1 tablet Oral Daily    simethicone  80 mg Oral 4x Daily    sacubitril-valsartan  1 tablet Oral BID    insulin lispro  0-18 Units Subcutaneous 4x Daily AC & HS    insulin glargine  23 Units Subcutaneous Nightly    bisacodyl  10 mg Rectal Daily    famotidine  20 mg Oral BID    [Held by provider] furosemide  60 mg Intravenous BID    piperacillin-tazobactam  3.375 g Intravenous Q8H    aspirin  81 mg Oral Daily    atorvastatin  10 mg Oral TID    baclofen  10 mg Oral Daily    pregabalin  75 mg Oral BID    tiotropium  2 puff Inhalation Daily    sodium chloride flush  10 mL Intravenous 2 times per day    enoxaparin  40 mg Subcutaneous Daily    busPIRone  5 mg Oral TID    finasteride  5 mg Oral Daily    modafinil  100 mg Oral Daily    budesonide-formoterol  2 puff Inhalation BID     PRN Meds: dicyclomine, glucose, dextrose, glucagon (rDNA), dextrose, zolpidem, potassium chloride **OR** potassium alternative oral replacement **OR** potassium chloride, albuterol, albuterol sulfate HFA, sodium chloride flush, acetaminophen **OR** acetaminophen, polyethylene glycol, promethazine **OR** ondansetron      Intake/Output Summary (Last 24 hours) at 10/10/2020 0809  Last data filed at 10/10/2020 0433  Gross per 24 hour   Intake 945.53 ml   Output 2450 ml   Net -1504.47 ml       Diet:  DIET GENERAL; Carb Control: 3 carb choices (45 gms)/meal; Low Sodium (2 GM); Daily Fluid Restriction: 2000 ml    Exam:  BP (!) 172/98   Pulse 91   Temp 98.1 °F (36.7 °C) (Oral)   Resp 17   Ht 5' 7\" (1.702 m)   Wt 214 lb 8 oz (97.3 kg)   SpO2 99%   BMI 33.60 kg/m²     General appearance: 76year old  male sitting comfortably, in no apparent distress, appears stated age and cooperative. HEENT: Pupils equal, round, and reactive to light. Conjunctivae/corneas clear.   Neck: Supple, with full range of motion. No jugular venous distention. Trachea midline. Respiratory:  Normal respiratory effort. Clear to auscultation, bilaterally without Rales/Wheezes/Rhonchi. Cardiovascular: Tachycardic and regular rhythm with normal S1/S2 without murmurs, rubs or gallops. Abdomen: moderate abdominal distention, nontender to palpation, hypertympanic with percussion, normoactive bowel sounds. No ascites appreciated. Musculoskeletal: passive and active ROM x 4 extremities. Skin: Skin color, texture, turgor normal.    Neurologic:  Neurovascularly intact without any focal sensory/motor deficits. Cranial nerves: II-XII intact, grossly non-focal.  Psychiatric: Alert and oriented, thought content appropriate  Capillary Refill: Brisk,< 3 seconds   Peripheral Pulses: +2 palpable, equal bilaterally       Labs:   Recent Labs     10/08/20  0736 10/09/20  0607 10/10/20  0540   WBC 13.6* 13.9* 13.1*   HGB 16.3 16.5 18.0   HCT 52.6* 53.0* 56.9*    162 170     Recent Labs     10/08/20  1923 10/09/20  0607 10/10/20  0540    145 139   K 3.3* 3.3* 3.4*   CL 99 102 93*   CO2 27 25 30   BUN 23* 25* 28*   CREATININE 1.4* 1.3* 1.4*   CALCIUM 8.9 9.0 9.5     No results for input(s): AST, ALT, BILIDIR, BILITOT, ALKPHOS in the last 72 hours. No results for input(s): INR in the last 72 hours. No results for input(s): Patricia Lowers in the last 72 hours. Recent Labs     10/10/20  0540   PROCAL 0.28*       Microbiology:  No new microbiology. Urinalysis:      Lab Results   Component Value Date    NITRU NEGATIVE 10/08/2020    WBCUA 10-15 10/08/2020    BACTERIA NONE SEEN 10/08/2020    RBCUA 3-5 10/08/2020    BLOODU NEGATIVE 10/08/2020    SPECGRAV >1.030 10/08/2020    GLUCOSEU NEGATIVE 10/03/2020       Radiology:  XR ABDOMEN (KUB) (SINGLE AP VIEW)   Final Result   Paralytic ileus involving predominantly the colon. **This report has been created using voice recognition software.   It may contain minor errors which are inherent in voice recognition technology. **         Final report electronically signed by Dr. Bina Terry on 10/9/2020 11:10 AM      XR CHEST PORTABLE   Final Result   Impression:   Support devices in place. No focal lung consolidation. This document has been electronically signed by: Hawa Sequeira MD on    10/08/2020 06:27 AM         CTA HEAD W WO CONTRAST (CODE STROKE NIHSS 4 or Above)   Final Result      CT HEAD WO CONTRAST   Final Result      CTA NECK W WO CONTRAST (CODE STROKE NIHSS 4 or Above)   Final Result   Patent neck CTA. Thyroid isthmus nodule. Consider nonemergent thyroid ultrasound. This document has been electronically signed by: Kasie Lindsay MD on 10/08/2020 03:10 AM      All CT scans at this facility use dose modulation, iterative    reconstruction, and/or weight-based   dosing when appropriate to reduce radiation dose to as low as reasonably    achievable. Carotid stenosis and measurements are in accordance with the NASCET    criteria. XR CHEST PORTABLE   Final Result   Impression:   Significant improvement in bilateral consolidations. This document has been electronically signed by: Hawa Sequeira MD on    10/07/2020 07:26 AM         XR CHEST PORTABLE   Final Result   Similar bilateral airspace disease. Possible left pleural effusion. Support devices as above. This document has been electronically signed by: Karla Cisneros MD on    10/06/2020 03:30 AM         XR CHEST PORTABLE   Final Result   Persistent bilateral infiltrates similar in appearance to prior study. Continued progress imaging is advised. Support lines and tubes in satisfactory position. **This report has been created using voice recognition software. It may contain minor errors which are inherent in voice recognition technology. **      Final report electronically signed by Dr. Chloe Escalante on 10/5/2020 2:34 PM      XR CHEST PORTABLE   Final Result Impression:   1. Tubes and line are as described above. 2.  Bilateral diffuse airspace disease appears similar to the prior study    and may represent pulmonary edema versus atypical pneumonia      This document has been electronically signed by: Ovidio Forbes MD on    10/04/2020 04:28 AM         CTA CHEST [address]   Final Result   1. The distal tip of the endotracheal tube is 3.3 cm above the level of the ila. 2. The esophageal tube extends to the stomach. 3. There is a right jugular central venous catheter with the distal tip within the superior vena cava. 4. There are small bilateral pleural effusions, right greater than left. There is infiltrate throughout both lung fields with additional consolidation within the mid and lower chest. Clinical management is recommended. Follow-up chest radiographs are    also recommended to confirm complete resolution. 5. There are mediastinal and hilar lymph nodes which may be reactive related to the inflammatory process. A follow-up CT examination of the chest with intravenous contrast in 3 months is recommended to confirm stability/resolution. 6. There is no pulmonary embolus. **This report has been created using voice recognition software. It may contain minor errors which are inherent in voice recognition technology. **      Final report electronically signed by Dr. Shaquille Saldivar on 10/3/2020 8:14 PM      XR CHEST PORTABLE   Final Result   1. The distal tip of the endotracheal tube is 5.2 cm above the level of the ila. 2. The esophageal tube extends into the stomach and off the inferior aspect of the image. 3. There is a right subclavian central venous catheter with the distal tip overlying the superior vena cava. There is no pneumothorax. 4. There are infiltrates throughout both lung fields with a predominantly perihilar distribution. There is no pleural effusion.       **This report has been created using

## 2020-10-10 NOTE — PLAN OF CARE
Problem: Impaired respiratory status  Goal: Clear lung sounds  Description: Clear lung sounds  10/10/2020 1845 by Tricia Lazcano RCP  Outcome: Ongoing  Note: Mdi to maintain open airways

## 2020-10-10 NOTE — PROGRESS NOTES
Cardiology Progress Note      Patient:  Julia Goodwin  YOB: 1955  MRN: [id number]   Acct: [de-identified]  Admit Date:  10/3/2020  Primary Cardiologist: Emory Lara MD    Note per dr Negrita Acosta: dyspnea        HPI: This is a pleasant 76 y.o. male who presents to Cardiology for consultation of possible heart failure with reduced ejection fraction. The patient has a past medical history of HTN, HLD, DM II, COPD, sleep apnea, and anxiety. The patient states that he began having shortness of breath several months to a year ago and that the shortness of breath is associated with hiccups. The patient states that his shortness of breath is only worrisome when he begins to hiccup. The patient states that the hiccups occur randomly and last for several minutes at onset. The patient denies shortness of breath when lying flat or a relief of shortness of breath when reclining. The patient states that there is no pain associated with his shortness of breath. Additionally, the patient mentioned that he has a history of bronchitis and hears a wheeze on occasion when he is breathing. The patient also mentioned that he has a history of sleep apnea. The patient states that when he begins to have breathing difficulties he uses his CPAP and nebulizer which relieves it acutely. The patient also denied chest pain of any kind, lightheadedness, dizziness, and episodes/feelings of syncope. The patient denies a history of swelling in the lower extremities but does state that his belly is tender when he lies flat. The patient denies a history of alcohol use/abuse.      Pump: Ejection fraction is visually estimated at 25-30%. There was severe global hypokinesis of the left ventricle. Valves: Aortic valve leaflets are somewhat thickened. Myxomatotic degeneration of mitral valve. Calcification of the mitral valve noted. Arteries: No hx of CAD. Cardiac cath done   Rhythm: Sinus tachycardia.  Possible Left atrial enlargement. LVH with repolarization abnormality. ST elevation consider inferior injury or acute infarct. Consider right ventricular involvement in acute inferior infarct. ST elevation now present in Inferior leads. Non-specific change in ST segment in Anterior leads. Peripheral vascular disease: No hx of PAD   \"    Subjective (Events in last 24 hours): pt awake and alert. NAD. No cp. Sob improved.   No edema  On 2 L/min O2    Net I/o -3.8 L    Objective:   BP (!) 163/88   Pulse 82   Temp 97.8 °F (36.6 °C) (Oral)   Resp 16   Ht 5' 7\" (1.702 m)   Wt 214 lb 8 oz (97.3 kg)   SpO2 98%   BMI 33.60 kg/m²        TELEMETRY: nsr    Physical Exam:  General Appearance: alert and oriented to person, place and time, in no acute distress  Cardiovascular: normal rate, regular rhythm, normal S1 and S2, no murmurs, rubs, clicks, or gallops, distal pulses intact, no carotid bruits, no JVD  Pulmonary/Chest: clear to auscultation bilaterally- no wheezes, rales or rhonchi, normal air movement, no respiratory distress  Abdomen: soft, non-tender, non-distended, normal bowel sounds, no masses Extremities: no cyanosis, clubbing or edema, pulse   Skin: warm and dry, no rash or erythema  Head: normocephalic and atraumatic  Eyes: pupils equal, round, and reactive to light  Neck: supple and non-tender without mass, no thyromegaly   Musculoskeletal: normal range of motion, no joint swelling, deformity or tenderness  Neurological: alert, oriented, normal speech, no focal findings or movement disorder noted    Medications:    insulin lispro  0-19 Units Subcutaneous 4x Daily AC & HS    metoprolol succinate  50 mg Oral Once    metoprolol succinate  100 mg Oral BID    potassium replacement protocol   Other RX Placeholder    therapeutic multivitamin-minerals  1 tablet Oral Daily    simethicone  80 mg Oral 4x Daily    sacubitril-valsartan  1 tablet Oral BID    insulin glargine  23 Units Subcutaneous Nightly    bisacodyl  10 mg Rectal Daily    famotidine  20 mg Oral BID    [Held by provider] furosemide  60 mg Intravenous BID    piperacillin-tazobactam  3.375 g Intravenous Q8H    aspirin  81 mg Oral Daily    atorvastatin  10 mg Oral TID    baclofen  10 mg Oral Daily    pregabalin  75 mg Oral BID    tiotropium  2 puff Inhalation Daily    sodium chloride flush  10 mL Intravenous 2 times per day    enoxaparin  40 mg Subcutaneous Daily    busPIRone  5 mg Oral TID    finasteride  5 mg Oral Daily    modafinil  100 mg Oral Daily    budesonide-formoterol  2 puff Inhalation BID      dextrose       zolpidem, 10 mg, Nightly PRN  dicyclomine, 20 mg, Q6H PRN  glucose, 15 g, PRN  dextrose, 12.5 g, PRN  glucagon (rDNA), 1 mg, PRN  dextrose, 100 mL/hr, PRN  potassium chloride, 40 mEq, PRN    Or  potassium alternative oral replacement, 40 mEq, PRN    Or  potassium chloride, 10 mEq, PRN  albuterol, 2.5 mg, Q4H PRN  albuterol sulfate HFA, 2 puff, Q6H PRN  sodium chloride flush, 10 mL, PRN  acetaminophen, 650 mg, Q6H PRN    Or  acetaminophen, 650 mg, Q6H PRN  polyethylene glycol, 17 g, Daily PRN  promethazine, 12.5 mg, Q6H PRN    Or  ondansetron, 4 mg, Q6H PRN        Diagnostics:  TTE 10/5/20   Summary   limited echo   Ejection fraction is visually estimated at 25-30%. There was severe global hypokinesis of the left ventricle. Akinetic motion of the mid anteroseptal wall noted in the left ventricle. The aortic valve leaflets were not well visualized. Aortic valve appears tricuspid. Aortic valve leaflets are somewhat thickened. Signature      ----------------------------------------------------------------   Electronically signed by De Case MD (Interpreting   physician) on 10/05/2020 at 03:24 PM    Lab Data:    Cardiac Enzymes:  No results for input(s): CKTOTAL, CKMB, CKMBINDEX, TROPONINI in the last 72 hours.     CBC:   Lab Results   Component Value Date    WBC 13.1 10/10/2020    RBC 6.71 10/10/2020    HGB 18.0 10/10/2020    HCT 56.9 10/10/2020     10/10/2020       CMP:    Lab Results   Component Value Date     10/10/2020    K 3.4 10/10/2020    K 4.0 10/03/2020    CL 93 10/10/2020    CO2 30 10/10/2020    BUN 28 10/10/2020    CREATININE 1.4 10/10/2020    GFRAA >60 10/17/2019    LABGLOM 61 10/10/2020    GLUCOSE 249 10/10/2020    CALCIUM 9.5 10/10/2020       Hepatic Function Panel:    Lab Results   Component Value Date    ALKPHOS 129 10/03/2020    ALT 29 10/03/2020    AST 28 10/03/2020    PROT 6.9 10/03/2020    BILITOT 0.9 10/03/2020    BILIDIR 0.3 10/03/2020    LABALBU 3.6 10/03/2020       Magnesium:    Lab Results   Component Value Date    MG 2.2 10/10/2020       PT/INR:    Lab Results   Component Value Date    INR 0.86 01/29/2020       HgBA1c:    Lab Results   Component Value Date    LABA1C 8.7 07/15/2020       FLP:    Lab Results   Component Value Date    TRIG 504 07/15/2020    HDL 36 07/15/2020    LDLDIRECT 96 07/15/2020       TSH:    Lab Results   Component Value Date    TSH 0.656 10/04/2020         Assessment:    Acute resp failure  ? PNA  Acute on chronic systolic CHF - improving  Hx NICMP - ef 25-30, ef 35-40 per TTE 2/24/20  HTN emergency  No significant CAD per cath 1/2020  HTN  HLD  DM  obesity      Plan:    Daily I/o and weights  2 liter fluid restriction and 2gm sodium diet  Keep mag >2 and K >4  Cont BB/entresto/statin  Iv lasix on hold - will need po lasix 40 daily upon dc  Add aldactone  lifevest  Referral to [facility] clinic  F/up dr Mandy David 2-4 weeks       Electronically signed by Lindsey Gastelum PA-C on 10/10/2020 at 11:46 AM

## 2020-10-10 NOTE — PLAN OF CARE
Problem: Impaired respiratory status  Goal: Clear lung sounds  Description: Clear lung sounds  Outcome: Ongoing

## 2020-10-10 NOTE — PROGRESS NOTES
Lauren Dunne 59  INPATIENT OCCUPATIONAL THERAPY  STR ICU STEPDOWN TELEMETRY 4K  EVALUATION    Time:    Time In: 1030  Time Out: 1120  Timed Code Treatment Minutes: 45 Minutes  Minutes: 50          Date: 10/10/2020  Patient Name: Dereje Echeverria,   Gender: male      MRN: [id number]  : 1955  (76 y.o.)  Referring Practitioner: Dr. Sera Acevedo MD  Diagnosis: Respiratory Failure  Additional Pertinent Hx: Pt presented to [address] [place] [address] ED on 10/3/2020, w/ compaints of increasing dyspnea over several weeks, but acutely worsened the day of presentation. On arrival, SpO2 was 76% on room air. He was placed on NRB support, w/ improvement. He continued to be tachypneic and labored. BiPAP therapy was started w/ no improvement. He required subsequent intubation. He was extubated initially on 10/5/20 but had to be reintubated later that day. Pt tested negative for COVID-19 twice. Restrictions/Precautions:  Restrictions/Precautions: Fall Risk  Position Activity Restriction  Other position/activity restrictions: O2, monitor BP    Subjective  Chart Reviewed: Yes, Orders, Progress Notes, History and Physical  Patient assessed for rehabilitation services?: Yes    Subjective: Pt sitting up in bedside chair with spouse present. Pt talkative and joking around throughout session with occasional inappropriateness with some possible confusion. Pain:  Pain Assessment  Patient Currently in Pain: Denies    Social/Functional History:  Lives With: Alone  Type of Home: Apartment  Home Layout: One level  Home Access: Elevator(6th floor)  Home Equipment: Cane   Bathroom Toilet: Standard  Bathroom Accessibility: Accessible       ADL Assistance: Independent  Ambulation Assistance: Independent  Transfer Assistance: Independent    Active : Yes     Additional Comments: Pt reports I prior to admission without AD.     VISION:WNL    HEARING:  WNL    COGNITION: Decreased Insight, Decreased Problem Solving and Decreased Safety Awareness    RANGE OF MOTION:  Bilateral Upper Extremity:  WNL    STRENGTH:  Bilateral Upper Extremity:  WNL    ADL:   Grooming: Stand By Assistance. seated  Lower Extremity Dressing: Maximum Assistance. socks. BALANCE:  Standing Balance: Minimal Assistance. to CGA during standing with bilateal UE support on walker    BED MOBILITY:  Not Tested    TRANSFERS:  Sit to Stand:  Minimal Assistance. from bedside chair an dEOB after education on hand placement for each transfers  Stand to Sit: Minimal Assistance. with education to reach back for surface sittin fuad    FUNCTIONAL MOBILITY:  Assistive Device: Rolling Walker  Assist Level:  Minimal Assistance. x1 and CGA x1   Distance: to edge of bed  Pt with increased fatigue requiring seated rest break at EOB. Noted weakness in LE with right knee buckling on 1 occasions going back to chair. Exercise:  Pt educated on completing bilateral UE strengthening HEP with emani theraband and handout to increase endurance and strength fo rADL asks. pt provided demo of each ex with educaiton n frequency of completion. Pt also requesting education on LE HEP with handout provided on AROM ex to be completed in chair. Pt returned dmeo of all ex this date. Activity Tolerance:  Patient tolerance of  treatment: fair. Increased fatigue and SOB while on 3L of O2. Pt did not wear PTA>       Assessment:  Assessment: Pt demo decreased strength and endurance to complete his ADL tasks and affecting his balance during. Pt would benefit from skilled OT services to increase his safety awareness , improve his balance during self care tasks and increase endurance for better tolerance to ativity to eventually return home with spouse.   Performance deficits / Impairments: Decreased functional mobility , Decreased endurance, Decreased ADL status, Decreased balance, Decreased strength, Decreased safe awareness  Prognosis: Fair  REQUIRES OT FOLLOW UP: Yes  Decision Making: Medium Complexity    Treatment Initiated: Treatment and education initiated within context of evaluation. Evaluation time included review of current medical information, gathering information related to past medical, social and functional history, completion of standardized testing, formal and informal observation of tasks, assessment of data and development of plan of care and goals. Treatment time included skilled education and facilitation of tasks to increase safety and independence with ADL's for improved functional independence and quality of life. Discharge Recommendations:  Patient would benefit from continued therapy after discharge, Continue to assess pending progress(Pt not safe to return home at this time d/t weakness and increased need for assistance during ADLs and mobility.)    Patient Education:  OT Education: OT Role, Plan of Care  Patient Education: safety with tranfsers and mobility  Barriers to Learning: cognition    Equipment Recommendations: Other: continue to assess    Plan:  Times per week: 5x  Current Treatment Recommendations: Patient/Caregiver Education & Training, Balance Training, Functional Mobility Training, Endurance Training, Safety Education & Training, Self-Care / ADL. See long-term goal time frame for expected duration of plan of care. If no long-term goals established, a short length of stay is anticipated.     Goals:  Patient goals : get strogner to go home  Short term goals  Time Frame for Short term goals: by discharge  Short term goal 1: Pt to complete LB ADL tasks with min A  Short term goal 2: Pt to demo dynamic standing > 2 min with 0-1 UE hand release from walker and CGA in prep for completing self care tasks at home  Short term goal 3: Pt to increase endurance and activity tolerance to navigate to/from bathroom using AD with min a and no cues for saety to complet self care task sn bathroom  Short term goal 4: Pt to complete toileting and transfer wit CGA and no cues for safety         Following session, patient left in safe position with all fall risk precautions in place.

## 2020-10-10 NOTE — FLOWSHEET NOTE
10/09/20 2133   Provider Notification   Reason for Communication Evaluate   Provider Name Dinh Plunkett   Provider Notification [facility] Practice Clinician (CNS, NP, CNM, CRNA, PA)   Method of Communication Secure Message   Response See orders   Notification Time 2133   Patient is requesting something for sleep, he takes Ambien PRN at home, he has no PRN order since leaving ICU and did not sleep at all last night. Orders received.

## 2020-10-11 NOTE — PROGRESS NOTES
Cardiology Progress Note      Patient:  Jean Milan  YOB: 1955  MRN: [id number]   Acct: [de-identified]  Admit Date:  10/3/2020  Primary Cardiologist: Guillermo Kapoor MD    Note per dr Hudson Gillette: dyspnea        HPI: This is a pleasant 76 y.o. male who presents to Cardiology for consultation of possible heart failure with reduced ejection fraction. The patient has a past medical history of HTN, HLD, DM II, COPD, sleep apnea, and anxiety. The patient states that he began having shortness of breath several months to a year ago and that the shortness of breath is associated with hiccups. The patient states that his shortness of breath is only worrisome when he begins to hiccup. The patient states that the hiccups occur randomly and last for several minutes at onset. The patient denies shortness of breath when lying flat or a relief of shortness of breath when reclining. The patient states that there is no pain associated with his shortness of breath. Additionally, the patient mentioned that he has a history of bronchitis and hears a wheeze on occasion when he is breathing. The patient also mentioned that he has a history of sleep apnea. The patient states that when he begins to have breathing difficulties he uses his CPAP and nebulizer which relieves it acutely. The patient also denied chest pain of any kind, lightheadedness, dizziness, and episodes/feelings of syncope. The patient denies a history of swelling in the lower extremities but does state that his belly is tender when he lies flat. The patient denies a history of alcohol use/abuse.      Pump: Ejection fraction is visually estimated at 25-30%. There was severe global hypokinesis of the left ventricle. Valves: Aortic valve leaflets are somewhat thickened. Myxomatotic degeneration of mitral valve. Calcification of the mitral valve noted. Arteries: No hx of CAD. Cardiac cath done   Rhythm: Sinus tachycardia.  Possible Left atrial enlargement. LVH with repolarization abnormality. ST elevation consider inferior injury or acute infarct. Consider right ventricular involvement in acute inferior infarct. ST elevation now present in Inferior leads. Non-specific change in ST segment in Anterior leads. Peripheral vascular disease: No hx of PAD   \"    Subjective (Events in last 24 hours): pt awake and alert. NAD. No cp. Sob improved.   No edema  On RA    Net I/o -3.1 L    Objective:   BP (!) 152/77   Pulse 81   Temp 98 °F (36.7 °C) (Oral)   Resp 19   Ht 5' 7\" (1.702 m)   Wt 214 lb 8 oz (97.3 kg)   SpO2 94%   BMI 33.60 kg/m²        TELEMETRY: nsr    Physical Exam:  General Appearance: alert and oriented to person, place and time, in no acute distress  Cardiovascular: normal rate, regular rhythm, normal S1 and S2, no murmurs, rubs, clicks, or gallops, distal pulses intact, no carotid bruits, no JVD  Pulmonary/Chest: clear to auscultation bilaterally- no wheezes, rales or rhonchi, normal air movement, no respiratory distress  Abdomen: soft, non-tender, non-distended, normal bowel sounds, no masses Extremities: no cyanosis, clubbing or edema, pulse   Skin: warm and dry, no rash or erythema  Head: normocephalic and atraumatic  Eyes: pupils equal, round, and reactive to light  Neck: supple and non-tender without mass, no thyromegaly   Musculoskeletal: normal range of motion, no joint swelling, deformity or tenderness  Neurological: alert, oriented, normal speech, no focal findings or movement disorder noted    Medications:    insulin glargine  50 Units Subcutaneous BID    oxybutynin  5 mg Oral Nightly    metoprolol succinate  150 mg Oral BID    insulin lispro  0-19 Units Subcutaneous 4x Daily AC & HS    spironolactone  25 mg Oral Daily    psyllium  1 packet Oral Daily    potassium replacement protocol   Other RX Placeholder    therapeutic multivitamin-minerals  1 tablet Oral Daily    simethicone  80 mg Oral 4x Daily    sacubitril-valsartan  1 tablet Oral BID    bisacodyl  10 mg Rectal Daily    famotidine  20 mg Oral BID    [Held by provider] furosemide  60 mg Intravenous BID    aspirin  81 mg Oral Daily    atorvastatin  10 mg Oral TID    baclofen  10 mg Oral Daily    pregabalin  75 mg Oral BID    tiotropium  2 puff Inhalation Daily    sodium chloride flush  10 mL Intravenous 2 times per day    enoxaparin  40 mg Subcutaneous Daily    busPIRone  5 mg Oral TID    finasteride  5 mg Oral Daily    [Held by provider] modafinil  100 mg Oral Daily    budesonide-formoterol  2 puff Inhalation BID      dextrose       zolpidem, 10 mg, Nightly PRN  hydrocortisone, , BID PRN  dicyclomine, 20 mg, Q6H PRN  glucose, 15 g, PRN  dextrose, 12.5 g, PRN  glucagon (rDNA), 1 mg, PRN  dextrose, 100 mL/hr, PRN  potassium chloride, 40 mEq, PRN    Or  potassium alternative oral replacement, 40 mEq, PRN    Or  potassium chloride, 10 mEq, PRN  albuterol, 2.5 mg, Q4H PRN  albuterol sulfate HFA, 2 puff, Q6H PRN  sodium chloride flush, 10 mL, PRN  acetaminophen, 650 mg, Q6H PRN    Or  acetaminophen, 650 mg, Q6H PRN  polyethylene glycol, 17 g, Daily PRN  promethazine, 12.5 mg, Q6H PRN    Or  ondansetron, 4 mg, Q6H PRN        Diagnostics:  TTE 10/5/20   Summary   limited echo   Ejection fraction is visually estimated at 25-30%. There was severe global hypokinesis of the left ventricle. Akinetic motion of the mid anteroseptal wall noted in the left ventricle. The aortic valve leaflets were not well visualized. Aortic valve appears tricuspid. Aortic valve leaflets are somewhat thickened. Signature      ----------------------------------------------------------------   Electronically signed by Alejandro Garcia MD (Interpreting   physician) on 10/05/2020 at 03:24 PM    Lab Data:    Cardiac Enzymes:  No results for input(s): CKTOTAL, CKMB, CKMBINDEX, TROPONINI in the last 72 hours.     CBC:   Lab Results   Component Value Date    WBC 10.2 10/11/2020    RBC 6.51 10/11/2020    HGB 17.1 10/11/2020    HCT 54.8 10/11/2020     10/11/2020       CMP:    Lab Results   Component Value Date     10/11/2020    K 2.9 10/11/2020    K 4.0 10/03/2020    CL 98 10/11/2020    CO2 26 10/11/2020    BUN 42 10/11/2020    CREATININE 1.7 10/11/2020    GFRAA >60 10/17/2019    LABGLOM 49 10/11/2020    GLUCOSE 227 10/11/2020    CALCIUM 9.6 10/11/2020       Hepatic Function Panel:    Lab Results   Component Value Date    ALKPHOS 129 10/03/2020    ALT 29 10/03/2020    AST 28 10/03/2020    PROT 6.9 10/03/2020    BILITOT 0.9 10/03/2020    BILIDIR 0.3 10/03/2020    LABALBU 3.6 10/03/2020       Magnesium:    Lab Results   Component Value Date    MG 2.5 10/11/2020       PT/INR:    Lab Results   Component Value Date    INR 0.86 01/29/2020       HgBA1c:    Lab Results   Component Value Date    LABA1C 8.7 07/15/2020       FLP:    Lab Results   Component Value Date    TRIG 504 07/15/2020    HDL 36 07/15/2020    LDLDIRECT 96 07/15/2020       TSH:    Lab Results   Component Value Date    TSH 0.656 10/04/2020         Assessment:    Acute resp failure  ? PNA  Acute on chronic systolic CHF - improving  Hx NICMP - ef 25-30, ef 35-40 per TTE 2/24/20  HTN emergency  No significant CAD per cath 1/2020  HTN  HLD  DM  obesity      Plan:    Daily I/o and weights  2 liter fluid restriction and 2gm sodium diet  Keep mag >2 and K >4  Cont BB/entresto/statin  Iv lasix on hold - will need po lasix 40 daily upon dc  Stop aldactone due to increasing creatinine  Consider nephrology consult  ?hold entresto  lifevest  Referral to chf clinic  F/up dr Coire Patricia 2-4 weeks       Electronically signed by Kenzie Little PA-C on 10/11/2020 at 9:29 AM

## 2020-10-11 NOTE — PROGRESS NOTES
Wt 214 lb 8 oz (97.3 kg)   SpO2 91%   BMI 33.60 kg/m²      Intake/Output Summary (Last 24 hours) at 10/11/2020 1516  Last data filed at 10/11/2020 1413  Gross per 24 hour   Intake 1186.21 ml   Output 200 ml   Net 986.21 ml        Physical Examination: General appearance - overweight and ill-appearing  Mental status - alert, oriented to person, place, and time  Neck - supple, no significant adenopathy, no JVD, or carotid bruits  Chest - clear to auscultation, no wheezes, rales or rhonchi, symmetric air entry  Heart - normal rate, regular rhythm, normal S1, S2, no murmurs, rubs, clicks or gallops  Abdomen - distended, tympanitic, high pitch bowel sounds  Neurological - alert, oriented, normal speech, no focal findings or movement disorder noted  Musculoskeletal - no joint tenderness, deformity or swelling  Extremities - no pedal edema noted  Skin - normal coloration and turgor, no rashes, no suspicious skin lesions noted    Data: (All radiographs, tracings, PFTs, and imaging are personally viewed and interpreted unless otherwise noted).     Reviewed labs, cxrs      Electronically signed by Ember Dumont MD on 10/11/2020 at 3:16 PM

## 2020-10-11 NOTE — CONSULTS
Patient chart reviewed for acute hypercapnic/hypoxic respiratory failure with an oxygen requirement, severe pneumonia, and acute exacerbation of systolic congestive heart failure with resulting physical deconditioning. Patient would be a good candidate for rehab admission based on review of his current therapy notes. He will require a prior authorization under his [employer] benefits. Thank you for the consult.     Judith Montoya MD, [facility]

## 2020-10-11 NOTE — PROGRESS NOTES
**This is a Medical/ PA/ APRN Student Note and is charted for educational purposes. The non-physician staff attested note is not to be used for billing purposes or to guide patient care. Please see the physician modifications/ attestation for treatment plan/suggestions. This note has been reviewed and feedback has been provided to the student. *     Hospitalist Progress Note    Patient:  Alan Auguste      Unit/Bed:4K-17/017-A    YOB: 1955    MRN: [id number]       Acct: [de-identified]     PCP: KRYSTAL Wiley CNP    Date of Admission: 10/3/2020    Assessment/Plan:    1. Essential Hypertension, improved: -150s overnight. Patient tolerating Entresto 49/51and Toprol 150 well. Continue metropolol 150 BID and Entresto 49/51 BID. Continue to hold home Losartan/HCTZ. 2. Acute Respiratory Failure, resolved: Extubated 10/08 without apparent complications. Patient now on nasal cannula PRN during hiccuping episodes. Monitor and supplement O2 to maintain O2>93%. 3. Acute on Chronic Congestive Heart Failure, stable: EF 10/5 of 25-30%, down from EF 35-40% on 02/2020. Cardiac cath 01/2020 showed no significant CAD. Will hold IV Lasix for another day. Appreciate cardiology input. 4. Bilateral Pneumonia vs Pulmonary Edema: CXR and CT on admission 10/3 showing bilateral diffuse opacities and infiltrate. CXR 10/8 shows resolution of bilateral infiltrates; rapid improvement more suggestive of CHF/pulmonary edema vs pneumonia. Molecular PNA panel on 10/5 positive for staph aureus. S/p decadron and Zosyn. 5. Sepsis, resolved: Patient meeting 1/4 SIRS criteria, as above pulmonary edema seems more likely vs pneumonia. S/p Zosyn. 6. Diarrhea, Abdominal Pain, improved: Has h/o IBS,diarrhea, and abdominal pain/distention. Received Metamucil, which improved his symptoms.   7. Paralytic Ileus, improved: Abdominal XR showing moderate gaseous distention of almost entire colon consistent with colonic ileus and suggestive of mild small bowel distention. Rectal tube now removed. 8. Type 2 DM: last A1C 07/2020 is 8.7. POCG 230-390s in last 24 hours. Adjust regimen to 50 units Lantus BID and continue high dose sliding scale. Patient's home regimen is Glargine 75 BID and Novolog sliding scale. Monitor closely. 9. UTI: urine cx 10/3 positive for E.Coli. S/p Zosyn. 10. Acute Kidney Injury, stable: BUN 28 and Cr 1.4. Likely secondary to CHF exacerbation. Hold Lasix today. 11. Hypokalemia: K 2.9, replete per protocol. Repeat BMP in AM.   12. Weakness/Leg Pain: concern for rhabdo vs. Deconditioning from long hospital stay and being intubated. Check CK. Awaiting PT.  13. H/O polycythemia: Hgb 17. Will hold diuretics x1d. Expected discharge date:  10/12/20    Disposition:    [x] Home       [] TCU       [] Rehab       [] Psych       [] SNF       [] [place]       [] Other-    Chief Complaint: shortness of breath    Hospital Course:  10/3: presented to ED with 2 weeks of increased dyspnea. Satting 76% on RA, failed trials of nonrebreather and BiPAP, intubated and ventilated. Admitted to ICU.  on arrival. CXR and CT with bilateral infiltrates. 10/4: started on Zosyn and decadron. Urine cx positive for E.Coli. 10/5: Extubated, and later re-intubated. Started Lasix. ECHO with EF of 25-30%. 10/8: Extubated in PM, transferred to floor. 10/9: loose stool and increasing abdominal distention. KUB showing colonic ileus. Max  in AM.   10/10: Abdominal symptoms improved. -170s. Adjust antihypertensives and insulin regimen. 10/11: adjusting insulin regimen. Awaiting PT/OT. Subjective (past 24 hours):   No acute events overnight. Nurse reports patient's partner is bringing in extra food for him to eat ie: chips. Patient seen sitting in chair. Denies any headache, dizziness, or other new symptoms. Patient reports that he \" feels great\" and the diarrhea and abdominal bloating are improved.  He endorses \"feeling weak and unsteady on his feet. \"    Medications:  Reviewed    Infusion Medications    dextrose       Scheduled Medications    insulin glargine  50 Units Subcutaneous BID    oxybutynin  5 mg Oral Nightly    metoprolol succinate  150 mg Oral BID    insulin lispro  0-19 Units Subcutaneous 4x Daily AC & HS    spironolactone  25 mg Oral Daily    psyllium  1 packet Oral Daily    potassium replacement protocol   Other RX Placeholder    therapeutic multivitamin-minerals  1 tablet Oral Daily    simethicone  80 mg Oral 4x Daily    sacubitril-valsartan  1 tablet Oral BID    bisacodyl  10 mg Rectal Daily    famotidine  20 mg Oral BID    [Held by provider] furosemide  60 mg Intravenous BID    aspirin  81 mg Oral Daily    atorvastatin  10 mg Oral TID    baclofen  10 mg Oral Daily    pregabalin  75 mg Oral BID    tiotropium  2 puff Inhalation Daily    sodium chloride flush  10 mL Intravenous 2 times per day    enoxaparin  40 mg Subcutaneous Daily    busPIRone  5 mg Oral TID    finasteride  5 mg Oral Daily    [Held by provider] modafinil  100 mg Oral Daily    budesonide-formoterol  2 puff Inhalation BID     PRN Meds: zolpidem, hydrocortisone, dicyclomine, glucose, dextrose, glucagon (rDNA), dextrose, potassium chloride **OR** potassium alternative oral replacement **OR** potassium chloride, albuterol, albuterol sulfate HFA, sodium chloride flush, acetaminophen **OR** acetaminophen, polyethylene glycol, promethazine **OR** ondansetron      Intake/Output Summary (Last 24 hours) at 10/11/2020 1506  Last data filed at 10/11/2020 1413  Gross per 24 hour   Intake 1186.21 ml   Output 200 ml   Net 986.21 ml       Diet:  DIET GENERAL; Carb Control: 3 carb choices (45 gms)/meal; Low Sodium (2 GM);  Daily Fluid Restriction: 2000 ml    Exam:  BP (!) 154/81   Pulse 89   Temp 98 °F (36.7 °C) (Oral)   Resp 20   Ht 5' 7\" (1.702 m)   Wt 214 lb 8 oz (97.3 kg)   SpO2 91%   BMI 33.60 kg/m²     General appearance: 76year old  male sitting comfortably, in no apparent distress, appears stated age and cooperative. HEENT: Pupils equal, round, and reactive to light. Conjunctivae/corneas clear. Neck: Supple, with full range of motion. No jugular venous distention. Trachea midline. Respiratory:  Normal respiratory effort. Clear to auscultation, bilaterally without Rales/Wheezes/Rhonchi. Cardiovascular: Regular rate and rhythm with normal S1/S2 without murmurs, rubs or gallops. Abdomen: mild abdominal distention, nontender to palpation, hypertympanic with percussion, normoactive bowel sounds. No ascites appreciated. Musculoskeletal: passive and active ROM x 4 extremities. Unsteady gait. Skin: Skin color, texture, turgor normal.    Neurologic:  Neurovascularly intact without any focal sensory/motor deficits. Cranial nerves: II-XII intact, grossly non-focal.  Psychiatric: Alert and oriented, thought content appropriate  Capillary Refill: Brisk,< 3 seconds   Peripheral Pulses: +2 palpable, equal bilaterally       Labs:   Recent Labs     10/09/20  0607 10/10/20  0540 10/11/20  0610   WBC 13.9* 13.1* 10.2   HGB 16.5 18.0 17.1   HCT 53.0* 56.9* 54.8*    170 160     Recent Labs     10/09/20  0607 10/10/20  0540 10/11/20  0610 10/11/20  1209    139 139  --    K 3.3* 3.4* 2.9* 3.5    93* 98  --    CO2 25 30 26  --    BUN 25* 28* 42*  --    CREATININE 1.3* 1.4* 1.7*  --    CALCIUM 9.0 9.5 9.6  --      No results for input(s): AST, ALT, BILIDIR, BILITOT, ALKPHOS in the last 72 hours. No results for input(s): INR in the last 72 hours. No results for input(s): Marcello Vivek in the last 72 hours. Recent Labs     10/10/20  0540   PROCAL 0.28*       Microbiology:  No new microbiology.     Urinalysis:      Lab Results   Component Value Date    NITRU NEGATIVE 10/08/2020    WBCUA 10-15 10/08/2020    BACTERIA NONE SEEN 10/08/2020    RBCUA 3-5 10/08/2020    BLOODU NEGATIVE 10/08/2020 SPECGRAV >1.030 10/08/2020    GLUCOSEU NEGATIVE 10/03/2020       Radiology:  XR ABDOMEN (KUB) (SINGLE AP VIEW)   Final Result   Paralytic ileus involving predominantly the colon. **This report has been created using voice recognition software. It may contain minor errors which are inherent in voice recognition technology. **         Final report electronically signed by Dr. Fatimah Rajan on 10/9/2020 11:10 AM      XR CHEST PORTABLE   Final Result   Impression:   Support devices in place. No focal lung consolidation. This document has been electronically signed by: Huey Pratt MD on    10/08/2020 06:27 AM         CTA HEAD W WO CONTRAST (CODE STROKE NIHSS 4 or Above)   Final Result      CT HEAD WO CONTRAST   Final Result      CTA NECK W WO CONTRAST (CODE STROKE NIHSS 4 or Above)   Final Result   Patent neck CTA. Thyroid isthmus nodule. Consider nonemergent thyroid ultrasound. This document has been electronically signed by: Chang Sinclair MD on 10/08/2020 03:10 AM      All CT scans at this facility use dose modulation, iterative    reconstruction, and/or weight-based   dosing when appropriate to reduce radiation dose to as low as reasonably    achievable. Carotid stenosis and measurements are in accordance with the NASCET    criteria. XR CHEST PORTABLE   Final Result   Impression:   Significant improvement in bilateral consolidations. This document has been electronically signed by: Huey Pratt MD on    10/07/2020 07:26 AM         XR CHEST PORTABLE   Final Result   Similar bilateral airspace disease. Possible left pleural effusion. Support devices as above. This document has been electronically signed by: Torsten Lazcano MD on    10/06/2020 03:30 AM         XR CHEST PORTABLE   Final Result   Persistent bilateral infiltrates similar in appearance to prior study. Continued progress imaging is advised.    Support lines and tubes in satisfactory position. **This report has been created using voice recognition software. It may contain minor errors which are inherent in voice recognition technology. **      Final report electronically signed by Dr. Little Lee on 10/5/2020 2:34 PM      XR CHEST PORTABLE   Final Result   Impression:   1. Tubes and line are as described above. 2.  Bilateral diffuse airspace disease appears similar to the prior study    and may represent pulmonary edema versus atypical pneumonia      This document has been electronically signed by: Omid Mulligan MD on    10/04/2020 04:28 AM         CTA CHEST [address]   Final Result   1. The distal tip of the endotracheal tube is 3.3 cm above the level of the ila. 2. The esophageal tube extends to the stomach. 3. There is a right jugular central venous catheter with the distal tip within the superior vena cava. 4. There are small bilateral pleural effusions, right greater than left. There is infiltrate throughout both lung fields with additional consolidation within the mid and lower chest. Clinical management is recommended. Follow-up chest radiographs are    also recommended to confirm complete resolution. 5. There are mediastinal and hilar lymph nodes which may be reactive related to the inflammatory process. A follow-up CT examination of the chest with intravenous contrast in 3 months is recommended to confirm stability/resolution. 6. There is no pulmonary embolus. **This report has been created using voice recognition software. It may contain minor errors which are inherent in voice recognition technology. **      Final report electronically signed by Dr. Tiffany Bravo on 10/3/2020 8:14 PM      XR CHEST PORTABLE   Final Result   1. The distal tip of the endotracheal tube is 5.2 cm above the level of the ila. 2. The esophageal tube extends into the stomach and off the inferior aspect of the image.       3. There is a right subclavian central venous catheter with the distal tip overlying the superior vena cava. There is no pneumothorax. 4. There are infiltrates throughout both lung fields with a predominantly perihilar distribution. There is no pleural effusion. **This report has been created using voice recognition software. It may contain minor errors which are inherent in voice recognition technology. **      Final report electronically signed by Dr. Matthew Washington on 10/3/2020 7:27 PM          DVT prophylaxis: [x] Lovenox                                 [] SCDs                                 [] SQ Heparin                                 [] Encourage ambulation           [] Already on Anticoagulation     Code Status: Full Code    Tele:   [x] yes             [] no    Active Hospital Problems    Diagnosis Date Noted    COPD (chronic obstructive pulmonary disease) ([facility] [J44.9]     Type 2 diabetes mellitus ([address] [L78.3]     Systolic CHF, acute on chronic ([address] [I50.23]     Essential hypertension [I10]     Ileus ([address] [K56.7]     Respiratory failure ([facility] [address] [J96.90] 10/03/2020       Electronically signed by Dexter Hernandez on 10/11/2020 at 3:06 PM     **This is a Medical/ PA/ APRN Student Note and is charted for educational purposes. The non-physician staff attested note is not to be used for billing purposes or to guide patient care. Please see the physician modifications/ attestation for treatment plan/suggestions. This note has been reviewed and feedback has been provided to the student.  *

## 2020-10-11 NOTE — PLAN OF CARE
At 1925 I was called by Nurse Suhail Ivna to examine Mr. Pamela Brown secondary to an unwitnessed fall from bed at approximately at [facility]. The report given by nursing staff is that they heard a \"thud\" come from room 4k-17 and when they went to investigate the Mr. Pamela Brown was laying on his back on the floor near the couch. The patient was assessed by nursing staff, vitals were obtained, and the patient was assisted back to bed with maximum assistance. When I arrived to the room the patient was in bed laying in the semi-fowlers position without complaint and appeared in no acute distress. The bed was already lowered to the lowest position, call light was within reach, and bed rails x2 were in the up position, bed brake was on, and the bed alarm was activated when I arrived. The patient stated that he had dropped his TV remote on the floor and he sat up and attempt to reach for it when he  \"slipped out of the bed\". The patient denies hitting his head or sustaining any injuries. Head to toe assessment, complete neurological exam, and musculoskeletal exam with passive and active range of motion were without findings of injury of deficit. CT head without contrast will be obtained. Recommend routine monitoring of the patient by nursing staff.     Electronically signed by Jennifer Suarez DO on 10/11/2020 at 7:56 PM

## 2020-10-11 NOTE — PROGRESS NOTES
Post-fall pharmacy consult    Pharmacy has been consulted to review medication profile for fall risk.   Medications increasing risk of falling: zolpidem, baclofen, furosemide, provigil, lyrica  Medications increasing risk of bleeding: aspirin, lovenox  Findings discussed with posted to note  Recommendations: none

## 2020-10-12 NOTE — PROGRESS NOTES
RW.  Pt tends to get distracted and becomes unsafe      ADDITIONAL ACTIVITIES:  .Patient identified one of their personal goals is to be able to sustain functional standing positions in order to complete various ADL and IADL skills in standing position, such as sinkside grooming or washing dishes. Dynamic standing task was then facilitated to challenge 1-2 hand release from RW . Patient required CGA , and demo'ed an endurance of 1-2  minutes. ASSESSMENT:     Activity Tolerance:  Patient tolerance of  treatment: good. Discharge Recommendations: Patient would benefit from continued therapy after discharge, Continue to assess pending progress(Pt not safe to return home at this time d/t weakness and increased need for assistance during ADLs and mobility.)   Equipment Recommendations: Other: continue to assess  Plan: Times per week: 5x  Current Treatment Recommendations: Patient/Caregiver Education & Training, Balance Training, Functional Mobility Training, Endurance Training, Safety Education & Training, Self-Care / ADL    Patient Education  Patient Education: ADL's and Importance of Increasing Activity    Goals  Short term goals  Time Frame for Short term goals: by discharge  Short term goal 1: Pt to complete LB ADL tasks with min A  Short term goal 2: Pt to demo dynamic standing > 2 min with 0-1 UE hand release from walker and CGA in prep for completing self care tasks at home  Short term goal 3: Pt to increase endurance and activity tolerance to navigate to/from bathroom using AD with min a and no cues for saety to complet self care task sn bathroom  Short term goal 4: Pt to complete toileting and transfer wit CGA and no cues for safety    Following session, patient left in safe position with all fall risk precautions in place.

## 2020-10-12 NOTE — PROGRESS NOTES
Hospitalist Progress Note    Patient:  Kai Johnson      Unit/Bed:4K-17/017-A    YOB: 1955    MRN: [id number]       Acct: [de-identified]     PCP: KRYSTAL Katz CNP    Date of Admission: 10/3/2020    Assessment/Plan:    1. Acute Cystitis: Patient reports hematuria for the past 4 days now improving. Patient initially presented with a complicated UTI which was culture positive for E. coli. Patient received 7 days therapy on Zosyn for coverage of pneumonia and UTI Eliquis is currently being held. Urology has been consulted, we will continue to monitor  2. Acute on chronic heart failure with reduced ejection fraction: Initial echocardiogram on 2/24/2020 showed a EF of 35 to 40% with trace mitral regurgitation. A repeat echocardiogram on 10/5/2020 demonstrated an ejection fraction of 25 to 30% with global hypokinesis of the left ventricle. Patient is currently on GDMT with Entresto, Aldactone, statin, metoprolol. We will attempt to maximize Entresto if creatinine remains stable. Patient will receive LifeVest on discharge. 3. DAI: Appears to be prerenal secondary to cardiorenal syndrome in the context of sepsis and heart failure. Last creatinine 1.2 and trending down. We will continue to monitor   4. IBS-D: Patient's abdomen remains distended and with dullness to tympany. Imaging shows air in bowel patient received a rectal tube for decompression of the abdomen. Fecal calprotectin pending  5. Diabetes mellitus type 2: Uncontrolled with last hemoglobin A1c of 8.7% on 7/20/2020. Have restarted patient on 60 units Lantus as well as medium dose correction insulin. 6. COPD/asthma: Patient has established diagnosis of COPD last FEV1 of 69% of predicted. Patient is currently not having an acute exacerbation.   We will continue patient's home medications including Symbicort, Spiriva, patient not having wheezing on last exam.  7. Sleep apnea: Patient has established diagnosis of nocturnal sleep apnea and wears CPAP at home. We will continue patient's home sleep apnea and would benefit  8. CAD: Last heart cath on 1/20 demonstrated no significant cardiac disease at that time. Patient denied chest pain or cardiac symptoms on arrival.  Troponin was negative we will continue patient's home dose of aspirin therapy and beta-blocker. 9. Hyperlipidemia: Patient has established diagnosis of hyperlipidemia. Last lipid panel showed total cholesterol 199 HDL 36 LDL 96 and triglycerides of 504. Continue patient's home dose of Lipitor and continue to advise lifestyle changes  10. GERD: We will continue patient's home medication of  Pepcid  11. Anxiety/depression: We will continue patient's home dose of BuSpar and psychotropic medications  12. Essential hypertension: Moderate control patient systolic pressures have been between 131 and 168 over the last 24 hours improving from 230 noted after leaving ICU  13. Hypernatremia(Resolved): Last sodium 139 and downtrending  14. Acute hypercapnic/hypoxic respiratory failure (resolved): Likely complicated by severe pneumonia. Patient initially required intubation following acute decompensation on 10/3/2020 and was extubated on 10/8/2020. 15. Sepsis (resolved): Secondary to severe pneumonia requiring intubation. Patient met Sirs criteria on arrival in ICU with leukocytosis, hypotension, tachycardia, to be febrile. Patient was given sepsis bolus fluids and started on empiric therapy with vancomycin and Zosyn. 16. Severe pneumonia(resolved): There was initial concern for COVID-19 due to severity of CT scan despite a negative PCR. Patient was started on empiric therapy with  7 days IV Zosyn, Decadron IV x4 days. Last white count on 10/11/2020 was 10.2 and downtrending. 17. UTI (resolved): Patient was culture positive for E. coli and received 7 days therapy with Zosyn.   18. Bilateral pleural effusions (resolved): Noted on CT on 10 3 likely secondary to chronic heart failure with reduced ejection fraction resolved when seen on chest x-ray on 10 7      Expected discharge date:  10/12/20    Disposition:    [x] Home with home health       [] TCU       [] Rehab       [] Psych       [] SNF       [] [place]       [] Other-    Chief Complaint: shortness of breath    Hospital Course:  Patient is a 68-year-old obese -American male current smoker with a past medical history significant for diabetes mellitus type 2, testosterone deficiency, sleep apnea, hypertension, hyperlipidemia, HFrEF COPD/asthma, and arthritis who originally presented on 10/3/2020 per private vehicle to [facility] emergency department reporting increased shortness of breath over several weeks, which was acutely worse on the day of admission. Patient systolic blood pressure was greater than 200 on arrival but dropped to 90 after Lopressor was given, SPO2 was 76% on room air patient was placed on a nonrebreather but continued to be tachypneic and labored. BiPAP therapy was started with no improvement. Patient required subsequent intubation. He received multiple albuterol and DuoNeb treatments, 125 mg of Solu-Medrol, and was sent to the ICU for further care. CTA of the chest showed bilateral infiltrates with combination of consolidation with extensive groundglass. Appearance and COVID-19 screen was negative x2. He developed an DAI, and pro-Enrike was elevated to 4. 11. Temperature was 100. 6. Zosyn was initiated with IV Decadron for severe pneumonia. Vancomycin was discontinued once MRSA note was negative. Urine culture grew E. Coli    Patient was successfully extubated following a 6 successful trial of spontaneous breathing on 10/5/2020 and remained on 3 L nasal cannula unlabored for multiple hours. He then acutely deteriorated becoming hypoxic and tachypneic. Albuterol, high flow nasal cannula, and BiPAP were given without improvement.   Lasix 60 mg IV twice daily was initiated. However he required reintubation. Echo later showed a drop in EF to 25 to 30% from the last ejection fraction of 35 to 40% noted on echo on 2/2020. Pneumonia panel was positive for MSSA and patient was treated with Zosyn. Overnight on 1010 722 10/8/2020 a code stroke was called as the patient was not following commands and not withdrawing extremities to pain. It was noted that the patient had no sensation of touch in his left arm. He rapidly improved with stop bang of sedative medications as the patient was deeply sedated on propofol and Precedex drips. CT and CTA of the head and neck were insignificant for any acute pathology and no TPA was administered. Stroke was felt to be unlikely by the neurologist.     On 10/8/2020 patient was successfully extubated and remained stable on 2 L and nasal cannula thereafter and was transferred to stepdown. Neurologic exam showed no stroke like symptoms. Patient was evaluated for ileus as KUB showed gaseous distention of the colon and small bowel. Patient underwent placement of rectal tube for acute decompression of the abdomen and reported resolution of his symptoms. A fecal calprotectin was ordered to evaluate patient for possible IBD with results outstanding. On the evening of 10/11/2022 10/12/2020 patient experienced an uncontrolled descent to the ground and was found on the floor near bedside. Patient denied hitting his head and refused evaluation by CT. Patient was seen and evaluated by the resident at the time of the incident and was found to be in no acute distress. A neurologic exam was performed and no neurosensory deficit was identified. Patient reported that he had dropped his TV remote on the floor and sat up to reach for it when he slipped out of bed. Patient became angered at 24-hour supervision following the event, refused CT against medical advice and refused his labs the following morning.     Subjective (past 24 hours):  RN budesonide-formoterol  2 puff Inhalation BID     PRN Meds: zolpidem, hydrocortisone, dicyclomine, glucose, dextrose, glucagon (rDNA), dextrose, potassium chloride **OR** potassium alternative oral replacement **OR** potassium chloride, albuterol, albuterol sulfate HFA, sodium chloride flush, acetaminophen **OR** acetaminophen, polyethylene glycol, promethazine **OR** ondansetron      Intake/Output Summary (Last 24 hours) at 10/12/2020 0836  Last data filed at 10/11/2020 2356  Gross per 24 hour   Intake 2345.96 ml   Output 250 ml   Net 2095.96 ml       Diet:  DIET GENERAL; Carb Control: 3 carb choices (45 gms)/meal; Low Sodium (2 GM); Daily Fluid Restriction: 2000 ml    Exam:  BP (!) 168/125 Comment: Pt only allowing BP  Pulse 85   Temp 98 °F (36.7 °C) (Oral)   Resp 16   Ht 5' 7\" (1.702 m)   Wt 221 lb 6.4 oz (100.4 kg)   SpO2 92%   BMI 34.68 kg/m²     General appearance: No apparent distress, appears stated age and cooperative. HEENT: Pupils equal, round, and reactive to light. Conjunctivae/corneas clear. Neck: Supple, with full range of motion. No jugular venous distention. Trachea midline. Respiratory:  Normal respiratory effort. Clear to auscultation, bilaterally without Rales/Wheezes/Rhonchi. Cardiovascular: Regular rate and rhythm with normal S1/S2 without murmurs, rubs or gallops. Abdomen: Soft, non-tender, non-distended with normal bowel sounds. Musculoskeletal: passive and active ROM x 4 extremities. Skin: Skin color, texture, turgor normal.  No rashes or lesions. Neurologic:  Neurovascularly intact without any focal sensory/motor deficits.  Cranial nerves: II-XII intact, grossly non-focal.  Psychiatric: Alert and oriented, thought content appropriate, normal insight  Capillary Refill: Brisk,< 3 seconds   Peripheral Pulses: +2 palpable, equal bilaterally       Labs:   Recent Labs     10/10/20  0540 10/11/20  0610   WBC 13.1* 10.2   HGB 18.0 17.1   HCT 56.9* 54.8*    160     Recent Labs criteria. XR CHEST PORTABLE   Final Result   Impression:   Significant improvement in bilateral consolidations. This document has been electronically signed by: Stephanie Bishop MD on    10/07/2020 07:26 AM         XR CHEST PORTABLE   Final Result   Similar bilateral airspace disease. Possible left pleural effusion. Support devices as above. This document has been electronically signed by: Zach Rivers MD on    10/06/2020 03:30 AM         XR CHEST PORTABLE   Final Result   Persistent bilateral infiltrates similar in appearance to prior study. Continued progress imaging is advised. Support lines and tubes in satisfactory position. **This report has been created using voice recognition software. It may contain minor errors which are inherent in voice recognition technology. **      Final report electronically signed by Dr. Geovanny Cornell on 10/5/2020 2:34 PM      XR CHEST PORTABLE   Final Result   Impression:   1. Tubes and line are as described above. 2.  Bilateral diffuse airspace disease appears similar to the prior study    and may represent pulmonary edema versus atypical pneumonia      This document has been electronically signed by: Pranay Morrow MD on    10/04/2020 04:28 AM         CTA CHEST [address]   Final Result   1. The distal tip of the endotracheal tube is 3.3 cm above the level of the ila. 2. The esophageal tube extends to the stomach. 3. There is a right jugular central venous catheter with the distal tip within the superior vena cava. 4. There are small bilateral pleural effusions, right greater than left. There is infiltrate throughout both lung fields with additional consolidation within the mid and lower chest. Clinical management is recommended. Follow-up chest radiographs are    also recommended to confirm complete resolution.       5. There are mediastinal and hilar lymph nodes which may be reactive related to the inflammatory process. A follow-up CT examination of the chest with intravenous contrast in 3 months is recommended to confirm stability/resolution. 6. There is no pulmonary embolus. **This report has been created using voice recognition software. It may contain minor errors which are inherent in voice recognition technology. **      Final report electronically signed by Dr. Mahi Cardenas on 10/3/2020 8:14 PM      XR CHEST PORTABLE   Final Result   1. The distal tip of the endotracheal tube is 5.2 cm above the level of the ila. 2. The esophageal tube extends into the stomach and off the inferior aspect of the image. 3. There is a right subclavian central venous catheter with the distal tip overlying the superior vena cava. There is no pneumothorax. 4. There are infiltrates throughout both lung fields with a predominantly perihilar distribution. There is no pleural effusion. **This report has been created using voice recognition software. It may contain minor errors which are inherent in voice recognition technology. **      Final report electronically signed by Dr. Mahi Cardenas on 10/3/2020 7:27 PM      CT HEAD WO CONTRAST    (Results Pending)       DVT prophylaxis: [x] Lovenox (on hold due to hematuria)                                 [] SCDs                                 [] SQ Heparin                                 [] Encourage ambulation           [] Already on Anticoagulation     Code Status: Full Code    PT/OT Eval Status: ordered    Tele:   [x] yes             [] no    Active Hospital Problems    Diagnosis Date Noted    Aspiration pneumonia ([address] [J69.0]     COPD (chronic obstructive pulmonary disease) ([address] [J44.9]     Type 2 diabetes mellitus ([address] [M75.8]     Systolic CHF, acute on chronic ([address] [I50.23]     Essential hypertension [I10]     Ileus ([address] [K56.7]     Respiratory failure ([address] [J96.90] 10/03/2020       Electronically signed by Niles Coppola DO on 10/12/2020 at 8:36 AM

## 2020-10-12 NOTE — PROGRESS NOTES
Post-Fall Assessment  Date of Fall:   10/11/2020  Time of Fall:   1912   Yes No N/A Comment   Was Patient on [place] Program? [x] [] []    Was the Fall Witnessed? [] [x] []    Were Clothes a Factor? [] [x] []    Was Patient Wearing Corrective Footwear? [x] [] []    Other Environmental Factors Involved? [] [x] []      Description of Fall  Who found the patient: Halina Anthony RN  Where was the patient at the time of the fall:  Lying on his back on the floor near the couch  Brief description of fall: This RN and Yazmin RN heard a thud and went to investigate in room and found patient lying on his back on the floor near the couch. Vitals obtained. Patient placed back in bed with 3 nurse assist.  Patient comments regarding fall:   Patient stated he was reaching for call button and slid out of bed. Medications potentially contributing to fall risk (such as Sedatives, Hypnotics, Antihypertensives, Narcotics, Psychotropics, Anticonvulsants):   Metoprolol, Zolpidem, Baclofen, Lyrica    Patient Assessment of Injury    (Please document Vital Signs in Doc Flowsheet)     Yes No   Patient hit his/her head [] []Unknown, Unwittnessed   Patient is taking an anticoagulant [x] []   CT of Head requested [x] []     Neurological Assessment Protocol: If the patient has hit his/her head during this fall,   a Neurological Assessment must be completed every 2 hours for 12 hours;   every 3 hours for 24 hours;   then every 4 hours for 24 hours. Document in Doc Flowsheets. Neurological Assessment Protocol initiated  no    If the patient did not hit his/her head during this fall, monitor vital signs every 8 hours. Notify the physician within 24 hours and document. Physician Notification  Please document under Provider Notification group within the Assessment (Complex Assessment) template of Doc Flowsheets.      Physician notified yes    Pharmacy notified yes Time Notified: [id number]ressa Lewis Supervisor/Clinical Manager

## 2020-10-12 NOTE — PROGRESS NOTES
Comprehensive Nutrition Assessment    Type and Reason for Visit:  Reassess    Nutrition Recommendations/Plan:   Continue current diet. MD to advise chemsticks 288-340 on carb controlled diet (3 carb servings/meal) low sodium diet. Pt fell out of bed (10/11) noted. Nutrition Assessment:     Pt improving from a nutritional standpoint AEB intake %. Remains at risk for further nutritional compromise r/t intubated 10/5 & reintubated 10/5, extubated & now on  Diet, Pneumonia, CHF and underlying medical condition (COPD, DM, Diarrhea, Depression, HTN). Nutrition recommendations/interventions as per above. Malnutrition Assessment:  Malnutrition Status: At risk for malnutrition (Comment)    Context:  Acute Illness     Findings of the 6 clinical characteristics of malnutrition:  Energy Intake:  No significant decrease in energy intake  Weight Loss:  Unable to assess(edema)     Body Fat Loss:  No significant body fat loss     Muscle Mass Loss:  No significant muscle mass loss    Fluid Accumulation:  1 - Mild     Strength:  Not Performed    Estimated Daily Nutrient Needs:  Energy (kcal):  ~4825-4404 kcals ( 15-18 kcals/kg); Weight Used for Energy Requirements:  Admission(10/3 106 kg)     Protein (g):  ~88 grams ( 1.3 grams/kg) monitor renal status; Weight Used for Protein Requirements:  Ideal(67.2 kg)        Fluid (ml/day):  .;   (per Dr)      Nutrition Related Findings:  Attempted to see pt & he was sleeping. Wife requests we not wake him up. Labs: (10/11) K+ 3.3, chemsticks 288-340. Meds: Dulcolax, Lantus, Humalog, Konsyl, Mylicon, MVI , Phenergan, Zofran. BM x 1 (10/11)      Wounds:  None(per RN)       Current Nutrition Therapies:    DIET GENERAL; Carb Control: 3 carb choices (45 gms)/meal; Low Sodium (2 GM);  Daily Fluid Restriction: 2000 ml    Anthropometric Measures:  · Height: 5' 7\" (170.2 cm)  · Current Body Weight: 221 lb 6.4 oz (100.4 kg)(+ 1 RLE & LLE edema)   · Admission Body Weight: 233 lb 11 oz (106 kg)(10/3 +1 edema)    · Usual Body Weight: 229 lb (103.9 kg)(7/1/20)     · Ideal Body Weight: 148 lbs;   · BMI: 34.7  ·   · BMI Categories: Obese Class 1 (BMI 30.0-34. 9)       Nutrition Diagnosis:   · Altered nutrition-related lab values related to impaired respiratory function, endocrine dysfuntion as evidenced by lab values      Nutrition Interventions:   Food and/or Nutrient Delivery:  Continue Current Diet  Nutrition Education/Counseling:  (education not appropriate at this time)   Coordination of Nutrition Care:  Continued Inpatient Monitoring    Goals:  pt's chemsticks will improve during LOS. (10/12) chemsticks 288-340. Nutrition Monitoring and Evaluation:   Food/Nutrient Intake Outcomes:  Diet Advancement/Tolerance, Food and Nutrient Intake  Physical Signs/Symptoms Outcomes:  Biochemical Data, Chewing or Swallowing, GI Status, Fluid Status or Edema, Nutrition Focused Physical Findings, Skin, Weight     Discharge Planning:     Too soon to determine     Electronically signed by Jocelyn Reno RD, LD on 10/12/20 at 1:04 PM EDT    [phone]

## 2020-10-12 NOTE — TELEPHONE ENCOUNTER
Can we please get records from [facility], pt says he sees dr Reuben Hewitt. Any CT scans, Renal US or office notes.   Thank you

## 2020-10-12 NOTE — PROGRESS NOTES
Subcutaneous 4x Daily AC & HS    spironolactone  25 mg Oral Daily    psyllium  1 packet Oral Daily    potassium replacement protocol   Other RX Placeholder    therapeutic multivitamin-minerals  1 tablet Oral Daily    simethicone  80 mg Oral 4x Daily    sacubitril-valsartan  1 tablet Oral BID    bisacodyl  10 mg Rectal Daily    famotidine  20 mg Oral BID    [Held by provider] furosemide  60 mg Intravenous BID    [Held by provider] aspirin  81 mg Oral Daily    atorvastatin  10 mg Oral TID    baclofen  10 mg Oral Daily    pregabalin  75 mg Oral BID    tiotropium  2 puff Inhalation Daily    sodium chloride flush  10 mL Intravenous 2 times per day    [Held by provider] enoxaparin  40 mg Subcutaneous Daily    busPIRone  5 mg Oral TID    finasteride  5 mg Oral Daily    [Held by provider] modafinil  100 mg Oral Daily    budesonide-formoterol  2 puff Inhalation BID      dextrose       zolpidem, 10 mg, Nightly PRN  hydrocortisone, , BID PRN  dicyclomine, 20 mg, Q6H PRN  glucose, 15 g, PRN  dextrose, 12.5 g, PRN  glucagon (rDNA), 1 mg, PRN  dextrose, 100 mL/hr, PRN  potassium chloride, 40 mEq, PRN    Or  potassium alternative oral replacement, 40 mEq, PRN    Or  potassium chloride, 10 mEq, PRN  albuterol, 2.5 mg, Q4H PRN  albuterol sulfate HFA, 2 puff, Q6H PRN  sodium chloride flush, 10 mL, PRN  acetaminophen, 650 mg, Q6H PRN    Or  acetaminophen, 650 mg, Q6H PRN  polyethylene glycol, 17 g, Daily PRN  promethazine, 12.5 mg, Q6H PRN    Or  ondansetron, 4 mg, Q6H PRN        Diagnostics:  TTE 10/5/20   Summary   limited echo   Ejection fraction is visually estimated at 25-30%. There was severe global hypokinesis of the left ventricle. Akinetic motion of the mid anteroseptal wall noted in the left ventricle. The aortic valve leaflets were not well visualized. Aortic valve appears tricuspid. Aortic valve leaflets are somewhat thickened.       Signature ----------------------------------------------------------------   Electronically signed by Martina Rothman MD (Interpreting   physician) on 10/05/2020 at 03:24 PM    Lab Data:    Cardiac Enzymes:  No results for input(s): CKTOTAL, CKMB, CKMBINDEX, TROPONINI in the last 72 hours. CBC:   Lab Results   Component Value Date    WBC 10.2 10/11/2020    RBC 6.51 10/11/2020    HGB 17.1 10/11/2020    HCT 54.8 10/11/2020     10/11/2020       CMP:    Lab Results   Component Value Date     10/11/2020    K 3.3 10/11/2020    K 4.0 10/03/2020    CL 98 10/11/2020    CO2 26 10/11/2020    BUN 42 10/11/2020    CREATININE 1.7 10/11/2020    GFRAA >60 10/17/2019    LABGLOM 49 10/11/2020    GLUCOSE 227 10/11/2020    CALCIUM 9.6 10/11/2020       Hepatic Function Panel:    Lab Results   Component Value Date    ALKPHOS 129 10/03/2020    ALT 29 10/03/2020    AST 28 10/03/2020    PROT 6.9 10/03/2020    BILITOT 0.9 10/03/2020    BILIDIR 0.3 10/03/2020    LABALBU 3.6 10/03/2020       Magnesium:    Lab Results   Component Value Date    MG 2.5 10/11/2020       PT/INR:    Lab Results   Component Value Date    INR 0.86 01/29/2020       HgBA1c:    Lab Results   Component Value Date    LABA1C 8.7 07/15/2020       FLP:    Lab Results   Component Value Date    TRIG 504 07/15/2020    HDL 36 07/15/2020    LDLDIRECT 96 07/15/2020       TSH:    Lab Results   Component Value Date    TSH 0.656 10/04/2020         Assessment:    Acute resp failure  ? PNA  Acute on chronic systolic CHF - improving  Hx NICMP - ef 25-30, ef 35-40 per TTE 2/24/20  HTN emergency  No significant CAD per cath 1/2020  HTN  HLD  DM  obesity      Plan:    Daily I/o and weights  2 liter fluid restriction and 2gm sodium diet  Keep mag >2 and K >4  Cont BB/entresto/statin/imdur/hydralazine  Iv lasix on hold - will need po lasix 40 daily upon dc  ? Stop aldactone due to increasing creatinine  Consider nephrology consult  ?hold entresto  Waiting on repeat labs - check bmp - getting now  lifevest  Referral to chf clinic  F/up dr Janeth Briscoe 2-4 weeks       Electronically signed by Carie Wheeler PA-C on 10/12/2020 at 2:22 PM

## 2020-10-12 NOTE — CARE COORDINATION
Update: faxed [address] [address] referral; will be fitted today; collaborated with Joe Gore  Electronically signed by Naveen Simons RN on 10/12/2020 at 10:36 AM

## 2020-10-12 NOTE — PROGRESS NOTES
[facility]  PHYSICAL THERAPY MISSED TREATMENT NOTE  [facility] ICU STEPDOWN TELEMETRY 4K    Date: 10/12/2020  Patient Name: Lila Gonzalez        MRN: [id number]   : 1955  (76 y.o.)  Gender: male   Referring Practitioner: HOSEA Cesar  Diagnosis: respiratory failure         REASON FOR MISSED TREATMENT:  Per discussion with pt's nurse and wife, will hold tx session until tomorrow AM. Pt has not gotten quality sleep lately, and is finally sleeping. Nurse and wife would both like to defer tx until tomorrow. , [facility] notified about this due to it being a pre-cert. Shaq Last, PT, DPT

## 2020-10-12 NOTE — CONSULTS
Urology Consult Note      Reason for Consult:  New hematuria  Requesting Physician:  Sanjana Bajwa, [facility]    History Obtained From:  patient, electronic medical record    Chief Complaint: SOB  HISTORY OF PRESENT ILLNESS:                The patient is a 76 y.o. male with significant past medical history of as listed below who presents with shortness of breath over several weeks. He was intubated and moved to ICU for further monitoring. EF of 25-30 %. He was later extubated and moved to ICU stepdown. He had an unwitnessed fall last night. He reports he didn't land hard, he did not land on his back. Urology was consulted for gross hematuria that started after the fall. He was on Lovenox and ASA prior to fall, they are on hold. Urine is unable to be evaluated at this time. He reports it is lightening up. He denies clots, dysuria, flank pain, urgency, frequency, or incomplete bladder emptying. He reports following with Urologist by [facility], for testosterone deficiency. He gets testosterone pellets. He reports he has had hematuria in the past, thinks he has had cystoscopy before. He is on finasteride, flomax and oxybutynin per medication list. He denies any irritative symptoms prior to hospitalization. Past Medical History:        Diagnosis Date    Acid reflux     Anxiety     Arthritis     in foot     Asthma     Constipation     [facility]chronic obstructive pulmonary disease) [facility]     Depression     Diarrhea     Hyperlipidemia     Hypertension     Loss of vision     Migraine     Neuralgia     Shortness of breath     Sleep apnea     Testosterone deficiency in male     Type 2 diabetes mellitus ([facility] [address]      Past Surgical History:        Procedure Laterality Date    CARDIAC CATHETERIZATION      EYE SURGERY Right 08/2019    HERNIA REPAIR       Allergies:  Patient has no known allergies.     Social History     Socioeconomic History    Marital status:      Spouse name: Not on file    Number of children: Not on file    Years of education: Not on file    Highest education level: Not on file   Occupational History    Not on file   Social Needs    Financial resource strain: Not on file    Food insecurity     Worry: Not on file     Inability: Not on file    Transportation needs     Medical: Not on file     Non-medical: Not on file   Tobacco Use    Smoking status: Former Smoker     Packs/day: 1.00     Years: 15.00     Pack years: 15.00     Types: Cigarettes     Start date: 1977     Last attempt to quit: 1992     Years since quittin.8    Smokeless tobacco: Never Used   Substance and Sexual Activity    Alcohol use: Yes     Comment: occ    Drug use: Never    Sexual activity: Not on file   Lifestyle    Physical activity     Days per week: Not on file     Minutes per session: Not on file    Stress: Not on file   Relationships    Social connections     Talks on phone: Not on file     Gets together: Not on file     Attends Restoration service: Not on file     Active member of club or organization: Not on file     Attends meetings of clubs or organizations: Not on file     Relationship status: Not on file    Intimate partner violence     Fear of current or ex partner: Not on file     Emotionally abused: Not on file     Physically abused: Not on file     Forced sexual activity: Not on file   Other Topics Concern    Not on file   Social History Narrative    Not on file       Family History:       Problem Relation Age of Onset    Colon Cancer Neg Hx     Colon Polyps Neg Hx     Liver Cancer Neg Hx     Esophageal Cancer Neg Hx     Rectal Cancer Neg Hx     Stomach Cancer Neg Hx        ROS:  Constitutional: Negative for chills, fatigue, fever, or weight loss. Eyes: Denies reported visual changes. ENT: Denies headache, difficulty swallowing, earache, and nosebleeds. Cardiovascular: Negative for chest pain, palpitations, tachycardia or edema. Respiratory: Denies cough or SOB.   GI:The patient denies 10/12/2020    GLUCOSE 292 10/12/2020     BUN/Creatinine:    Lab Results   Component Value Date    BUN 36 10/12/2020    CREATININE 1.2 10/12/2020     Magnesium:    Lab Results   Component Value Date    MG 2.4 10/12/2020     Phosphorus:    Lab Results   Component Value Date    PHOS 3.5 10/06/2020     PT/INR:    Lab Results   Component Value Date    INR 0.86 01/29/2020     U/A:    Lab Results   Component Value Date    COLORU RED 10/12/2020    PHUR 5.5 10/12/2020    LABCAST NONE SEEN 10/08/2020    LABCAST NONE SEEN 10/08/2020    WBCUA 10-15 10/12/2020    RBCUA > 100 10/12/2020    MUCUS NOT REPORTED 03/09/2020    TRICHOMONAS NOT REPORTED 03/09/2020    YEAST NONE SEEN 10/08/2020    BACTERIA NONE SEEN 10/12/2020    SPECGRAV >1.030 10/08/2020    LEUKOCYTESUR SMALL 10/12/2020    UROBILINOGEN 0.2 10/12/2020    BILIRUBINUR NEGATIVE 10/12/2020    BLOODU LARGE 10/12/2020    GLUCOSEU >= 1000 10/12/2020    AMORPHOUS NOT REPORTED 03/09/2020       IMPRESSION:     Gross hematuria  Testosterone deficiency  CHF  BPH    Plan:      Follows with Dr Ab Casey, will need to get records from Dr. Marjan Wilson office. If no recent imaging done would recommend CT. If no recent cystoscopy done, need cysto as outpatient. Keep serial urine's. Pt denies flank pain, and says urine is clearing up. If hematuria resolved tomorrow, can restart ASA and lovenox. Thank you for including us in the care of KRYSTAL Colunga  10/12/20 5:24 PM  Urology    ADDENDUM:    1600: RN reports urine was clear yellow this morning and now its more concentrated, but not grossly bloody.     Electronically signed by KRYSTAL Cisneros - CNP on 10/12/2020 at 5:26 PM

## 2020-10-12 NOTE — FLOWSHEET NOTE
10/12/20 0431   Provider Notification   Reason for Communication Evaluate   Provider Name Silvana Grief    Provider Notification Advance Practice Clinician (CNS, NP, CNM, CRNA, PA)   Method of Communication Secure Message   Response No new orders   Notification Time [phone]   Secure message sent to update on UA results. Message received. Asked to get a poct. 0502- Updated pt refused.

## 2020-10-12 NOTE — FLOWSHEET NOTE
2013- Telesitter initiated in pt room due to previous fall this shift. Pt educated on how the telesitter assists in providing safety, our top priority. Pt also educated on getting up with staff assistance and the necessity of the bed alarm. Pt verbalizes understanding. Maintenance order placed for home CPAP (#[id number]). 2135- This RN noticed a change in the telemetry since the fall. STAT EKG ordered. willian Reeves RN compared EKGs with this RN. SUMA Rea notified. No new orders. 2214- Received call from pt's wife that patient did not want any tests or procedures after the EKG. Pt wife also mentioned she did not know why it was taking so long for pt CPAP to get checked. This RN informed the wife I would communicate with the patient because he can make his own decisions. This RN also told the wife the maintenance order was placed already and would be double checked with maintenance. 2217- Pt educated on why this RN ordered a stat EKG. Also educated on his low K+ and how dangerous it can be. Pt requested to use commode. This RN placed gait belt, non-skid socks, and front wheel walker for patient to utilize. Pt requested this RN not assist him to bedside commode because he didn't want to embarrass me. I explained it would be optimal for me to at least hold on to the gait belt. Pt agreed and assisted to the commode. Pt very unsteady during transfer and almost fell. New bloody urine noted. Pt neuro assessment completed with no changes. Hospitalist notified and updated on patient situation. Aspirin and lovenox held. UA ordered. Urology to be consulted. 65- Pt wife called and said pt does not want anyone in his room the rest of the night as patient is exhausted. This RN explained a head CT is completed after a fall per hospital policy and that I would personally ask the pt for consent since he is able to make his own decisions.  This RN also mentioned I would speak to pt about assessing him the obtain the BP monitor across the lorenzo, pt attempted to get up 3x as soon as this RN left the room after educating the patient he was not stable enough to get up on his own. /125. SUMA Maki at bedside and ordered 0.1 mg clonidine. Pt refused stating \"I am not taking any pills the rest of the night. \" Pt educated on the effects of high BPs and continued to refuse. Pt dangling at edge of bed refusing to lay down in bed. This RN educated pt that the bed alarm would continue to alarm if patient's butt wasn't placed further in bed. Pt refused and attempted to get up on his own. Norma Dewitt RN called to room to assist. This RN and Dagoberto Rivera provided additional education on how keeping pt safe is our top priority. Pt stating we needed to get out of his apartment. Pt reminded he was in the hospital and pt responded he knew that. Wife called Norma Dewitt RN. Wife updated on pt being uncooperative and wife stated she was on her way in. After extensive education, pt agreed to lay down in bed. Pt on phone with wife instructing her to \"call 911 or security. \" Bed alarm on, telesitter at bedside. Telesitter called this RN. Pt on the phone with wife stating, \"Don't come without the pistol. \"    [address] [facility] [employer] police notified. Reported they would meet the wife in the ED.     [address] police called and said wife denied the comment about the \"pistol,\" saying it was never said. Pt wife at bedside. Bed alarm on, telesitter at bedside. Call light within reach. Pt awake in room. 0125- Pt telemetry leads off. This RN peaked into the room. Pt rolling in bed. Wife and patient denies any needs at this time. 0220- Pt appears to be resting comfortably. 0330- Pt appears to be resting comfortably. [address] SU[address] Basilio updated on pt UA results. She requested a POCT. Pt refused at this time. 0505- Pt appears to be resting comfortably at this time. 5207- Pt call light alarmed.  Attempted to do a mini assessment upon

## 2020-10-12 NOTE — FLOWSHEET NOTE
10/11/20 2217   Provider Notification   Reason for Communication Evaluate   Provider Name Jabari Dunlap   Provider Notification Advance Practice Clinician (CNS, NP, CNM, CRNA, PA)   Method of Communication Secure Message   Response See orders   Notification Time 2217   Secure message sent to notify that pt refused his CT since his fall and pt now has bloody urine. Message received. Lovenox and aspirin held. UA ordered. Urology to be consulted.

## 2020-10-12 NOTE — PLAN OF CARE
Problem: Impaired respiratory status  Goal: Clear lung sounds  Description: Clear lung sounds  Outcome: Ongoing  Note: Symbicort and Spiriva given as ordered to improve aeration to lungs.

## 2020-10-12 NOTE — PLAN OF CARE
Problem: Nutrition  Goal: Optimal nutrition therapy  Outcome: Ongoing   Nutrition Problem #1: Altered nutrition-related lab values  Intervention: Food and/or Nutrient Delivery: Continue Current Diet  Nutritional Goals: pt's chemsticks will improve during LOS. (10/12) chemsticks 288-340.

## 2020-10-13 PROBLEM — J96.90 RESPIRATORY FAILURE (HCC): Status: RESOLVED | Noted: 2020-01-01 | Resolved: 2020-01-01

## 2020-10-13 NOTE — PROGRESS NOTES
Mone Perez was evaluated today and a DME order was entered for a wheeled walker because he requires this to successfully complete daily living tasks of eating, bathing, toileting, personal cares, ambulating and meal preparation. A wheeled walker is necessary due to the patient's unsteady gait, upper body weakness, and inability to  an ambulation device; and he can ambulate only by pushing a walker instead of a lesser assistive device such as a cane, crutch, or standard walker. The need for this equipment was discussed with the patient and he understands and is in agreement.

## 2020-10-13 NOTE — PLAN OF CARE
Consult received, see VALERIA note, Oct 13, home alone with [facility]

## 2020-10-13 NOTE — PROGRESS NOTES
Patient states that he is doing well this morning and wants to [place] home. \" He denies dyspnea, chest pain, N/V, leg pain, or lightheadedness. He denies urinary retention and states that his urine is dark yellow with occasional pinkish discoloration. He denies blood clots in his urine, flank pain, urgency, frequency, or dysuria. He is passing flatus and having bowel movements. Tolerating diet. Vitals:    10/13/20 0415 10/13/20 0430 10/13/20 0755 10/13/20 0800   BP: 135/69   123/85   Pulse: 75   79   Resp: 18   19   Temp: 97.7 °F (36.5 °C)   98.6 °F (37 °C)   TempSrc: Oral   Oral   SpO2: 95%  93% 96%   Weight:  221 lb (100.2 kg)     Height:             Intake/Output Summary (Last 24 hours) at 10/13/2020 1043  Last data filed at 10/13/2020 0823  Gross per 24 hour   Intake 960 ml   Output 1050 ml   Net -90 ml       Labs  Recent Labs     10/11/20  0610 10/11/20  1209 10/11/20  1720 10/12/20  1501   WBC 10.2  --   --  12.2*   HGB 17.1  --   --  17.8   HCT 54.8*  --   --  56.4*     --   --  176     --   --  138   K 2.9* 3.5 3.3* 3.7   CL 98  --   --  99   CO2 26  --   --  25   BUN 42*  --   --  36*   CREATININE 1.7*  --   --  1.2   MG 2.5*  --   --  2.4   CALCIUM 9.6  --   --  10.1     Urine reflex (10/12/20): Small LE, >100 RBCs, 10-15 WBCs, No bacteria    Exam  General: Alert and oriented x 3. In NAD. Heart: Regular rate and rhythm. S1 and S2 normal.  Lungs: Clear bilaterally without rales, rhonchi, or wheezes  Abdomen: Soft. Non-tender. Active bowel sounds. No CVA tenderness bilaterally. Ext: No pitting edema or calf tenderness bilaterally. Assessment and Plan  1. Gross Hematuria- Started after miller catheter discontinued and apparent fall in his room. Urine reflex pending. Hemoglobin stable. Patient reports significant improvement in appearance of urine. His saved urine ranges from dark yellow to light pinkish. May restart ASA and Lovenox from urology standpoint.  Patient reports a history of intermittent episodes of brief gross hematuria. He believes his last cystoscopy was seven years ago and does not remember why it was performed. He states that it was very painful and does not wish to have a cystoscopy in an office setting again. He does not believe he has had a recent CT of the abdomen and pelvis performed. He has been following with Dr. Ochoa Alvarez  but would like to switch providers. Will obtain [facility]. Two week follow-up for gross hematuria. Recommend outpatient CT urogram and urine cytology outpatient to start evaluation. Recommend outpatient cystoscopy. 2. BPH with LUTS- On Proscar. 3. Acute on Chronic CHF- Lifevest. Aldactone, Entresto. 4. HTN- On Hydralazine, Toprol XL.     5. Diabetes Mellitus- On Lantus, Humalog    Ralf Gerber PA-C  10/13/20

## 2020-10-13 NOTE — PROGRESS NOTES
Cardiology Progress Note      Patient:  Enmanuel High  YOB: 1955  MRN: [id number]   Acct: [de-identified]  Admit Date:  10/3/2020  Primary Cardiologist: William Reyes MD    Note per dr Waters Later: dyspnea        HPI: This is a pleasant 76 y.o. male who presents to Cardiology for consultation of possible heart failure with reduced ejection fraction. The patient has a past medical history of HTN, HLD, DM II, COPD, sleep apnea, and anxiety. The patient states that he began having shortness of breath several months to a year ago and that the shortness of breath is associated with hiccups. The patient states that his shortness of breath is only worrisome when he begins to hiccup. The patient states that the hiccups occur randomly and last for several minutes at onset. The patient denies shortness of breath when lying flat or a relief of shortness of breath when reclining. The patient states that there is no pain associated with his shortness of breath. Additionally, the patient mentioned that he has a history of bronchitis and hears a wheeze on occasion when he is breathing. The patient also mentioned that he has a history of sleep apnea. The patient states that when he begins to have breathing difficulties he uses his CPAP and nebulizer which relieves it acutely. The patient also denied chest pain of any kind, lightheadedness, dizziness, and episodes/feelings of syncope. The patient denies a history of swelling in the lower extremities but does state that his belly is tender when he lies flat. The patient denies a history of alcohol use/abuse.      Pump: Ejection fraction is visually estimated at 25-30%. There was severe global hypokinesis of the left ventricle. Valves: Aortic valve leaflets are somewhat thickened. Myxomatotic degeneration of mitral valve. Calcification of the mitral valve noted. Arteries: No hx of CAD. Cardiac cath done   Rhythm: Sinus tachycardia.  Possible Left atrial enlargement. LVH with repolarization abnormality. ST elevation consider inferior injury or acute infarct. Consider right ventricular involvement in acute inferior infarct. ST elevation now present in Inferior leads. Non-specific change in ST segment in Anterior leads. Peripheral vascular disease: No hx of PAD   \"    Subjective (Events in last 24 hours):   Pt awake and alert. NAD. no cp or sob.   Pt is ready to go home    Net I/o -366 L    Objective:   /73   Pulse 64   Temp 98 °F (36.7 °C) (Oral)   Resp 12   Ht 5' 7\" (1.702 m)   Wt 221 lb (100.2 kg)   SpO2 95%   BMI 34.61 kg/m²        TELEMETRY: nsr    Physical Exam:  General Appearance: alert and oriented to person, place and time, in no acute distress  Cardiovascular: normal rate, regular rhythm, normal S1 and S2, no murmurs, rubs, clicks, or gallops, distal pulses intact, no carotid bruits, no JVD  Pulmonary/Chest: clear to auscultation bilaterally- no wheezes, rales or rhonchi, normal air movement, no respiratory distress  Abdomen: soft, non-tender, non-distended, normal bowel sounds, no masses Extremities: no cyanosis, clubbing or edema, pulse   Skin: warm and dry, no rash or erythema  Head: normocephalic and atraumatic  Eyes: pupils equal, round, and reactive to light  Neck: supple and non-tender without mass, no thyromegaly   Musculoskeletal: normal range of motion, no joint swelling, deformity or tenderness  Neurological: alert, oriented, normal speech, no focal findings or movement disorder noted    Medications:    cloNIDine  0.1 mg Oral Once    isosorbide mononitrate  30 mg Oral Daily    hydrALAZINE  10 mg Oral Q8H    insulin glargine  60 Units Subcutaneous BID    oxybutynin  5 mg Oral Nightly    metoprolol succinate  150 mg Oral BID    insulin lispro  0-19 Units Subcutaneous 4x Daily AC & HS    spironolactone  25 mg Oral Daily    psyllium  1 packet Oral Daily    potassium replacement protocol   Other RX Placeholder    therapeutic multivitamin-minerals  1 tablet Oral Daily    simethicone  80 mg Oral 4x Daily    sacubitril-valsartan  1 tablet Oral BID    bisacodyl  10 mg Rectal Daily    famotidine  20 mg Oral BID    [Held by provider] furosemide  60 mg Intravenous BID    [Held by provider] aspirin  81 mg Oral Daily    atorvastatin  10 mg Oral TID    baclofen  10 mg Oral Daily    pregabalin  75 mg Oral BID    tiotropium  2 puff Inhalation Daily    sodium chloride flush  10 mL Intravenous 2 times per day    [Held by provider] enoxaparin  40 mg Subcutaneous Daily    busPIRone  5 mg Oral TID    finasteride  5 mg Oral Daily    [Held by provider] modafinil  100 mg Oral Daily    budesonide-formoterol  2 puff Inhalation BID      dextrose       zolpidem, 10 mg, Nightly PRN  hydrocortisone, , BID PRN  dicyclomine, 20 mg, Q6H PRN  glucose, 15 g, PRN  dextrose, 12.5 g, PRN  glucagon (rDNA), 1 mg, PRN  dextrose, 100 mL/hr, PRN  potassium chloride, 40 mEq, PRN    Or  potassium alternative oral replacement, 40 mEq, PRN    Or  potassium chloride, 10 mEq, PRN  albuterol, 2.5 mg, Q4H PRN  albuterol sulfate HFA, 2 puff, Q6H PRN  sodium chloride flush, 10 mL, PRN  acetaminophen, 650 mg, Q6H PRN    Or  acetaminophen, 650 mg, Q6H PRN  polyethylene glycol, 17 g, Daily PRN  promethazine, 12.5 mg, Q6H PRN    Or  ondansetron, 4 mg, Q6H PRN        Diagnostics:  TTE 10/5/20   Summary   limited echo   Ejection fraction is visually estimated at 25-30%. There was severe global hypokinesis of the left ventricle. Akinetic motion of the mid anteroseptal wall noted in the left ventricle. The aortic valve leaflets were not well visualized. Aortic valve appears tricuspid. Aortic valve leaflets are somewhat thickened.       Signature      ----------------------------------------------------------------   Electronically signed by Harvinder Burleson MD (Interpreting   physician) on 10/05/2020 at 03:24 PM    Lab Data:    Cardiac Enzymes:  No results for input(s): CKTOTAL, CKMB, CKMBINDEX, TROPONINI in the last 72 hours. CBC:   Lab Results   Component Value Date    WBC 12.2 10/12/2020    RBC 6.80 10/12/2020    HGB 17.8 10/12/2020    HCT 56.4 10/12/2020     10/12/2020       CMP:    Lab Results   Component Value Date     10/12/2020    K 3.7 10/12/2020    K 4.0 10/03/2020    CL 99 10/12/2020    CO2 25 10/12/2020    BUN 36 10/12/2020    CREATININE 1.2 10/12/2020    GFRAA >60 10/17/2019    LABGLOM 73 10/12/2020    GLUCOSE 292 10/12/2020    CALCIUM 10.1 10/12/2020       Hepatic Function Panel:    Lab Results   Component Value Date    ALKPHOS 129 10/03/2020    ALT 29 10/03/2020    AST 28 10/03/2020    PROT 6.9 10/03/2020    BILITOT 0.9 10/03/2020    BILIDIR 0.3 10/03/2020    LABALBU 3.6 10/03/2020       Magnesium:    Lab Results   Component Value Date    MG 2.4 10/12/2020       PT/INR:    Lab Results   Component Value Date    INR 0.86 01/29/2020       HgBA1c:    Lab Results   Component Value Date    LABA1C 8.7 07/15/2020       FLP:    Lab Results   Component Value Date    TRIG 504 07/15/2020    HDL 36 07/15/2020    LDLDIRECT 96 07/15/2020       TSH:    Lab Results   Component Value Date    TSH 0.656 10/04/2020         Assessment:    Acute resp failure - extubated, on RA  ? PNA  Acute on chronic systolic CHF - improving  Hx NICMP - ef 25-30, ef 35-40 per TTE 2/24/20  HTN emergency  No significant CAD per cath 1/2020  HTN  HLD  DM  obesity      Plan:    Daily I/o and weights  2 liter fluid restriction and 2gm sodium diet  Keep mag >2 and K >4  Cont BB/entresto/statin/imdur/hydralazine/aldactone  Iv lasix on hold - recommend lasix 20 daily upon dc  Consider nephrology consult  lifevest  Referral to chf clinic  Bmp 1 week  F/up dr Bharat Valerio 2-4 weeks       Electronically signed by Samantha Romberg, PA-C on 10/13/2020 at 2:41 PM

## 2020-10-13 NOTE — PROGRESS NOTES
CLINICAL PHARMACY: DISCHARGE MED RECONCILIATION/REVIEW    [facility] ([facility]) Select Patient?: No  Total # of Interventions Recommended: 2 - Decreased Dose #: 1  - Updated Order #: 1   -   Total # Interventions Accepted: 2  Intervention Severity:   - Level 1 Intervention Present?: No   - Level 2 #: 0   - Level 3 #: 2   Time Spent (min): 75    Additional Documentation:    Willie VallejoD, BCPS   10/13/2020  5:13 PM

## 2020-10-13 NOTE — PROGRESS NOTES
[address] [address]  INPATIENT PHYSICAL THERAPY  Re-Assessment  [facility] ICU STEPDOWN TELEMETRY 4K - [id number]    Time In: 6106  Time Out: 1037  Timed Code Treatment Minutes: 23 Minutes  Minutes: 23          Date: 10/13/2020  Patient Name: Manuel Salamanca,  Gender:  male        MRN: [id number]  : 1955  (76 y.o.)     Referring Practitioner: HOSEA Rebolledo  Diagnosis: respiratory failure  Additional Pertinent Hx: Per EMR: \"He presented to [facility] ED on 10/3/2020, w/ compaints of increasing dyspnea over several weeks, but acutely worsened the day of presentation. On arrival, SpO2 was 76% on room air. He was placed on NRB support, w/ improvement. He continued to be tachypneic and labored. BiPAP therapy was started w/ no improvement. He required subsequent intubation. He received 3x albuterol and 1x Duoneb treatments, 125 mg of solu-medrol IV, and lopressor 5 mg IV once. SBP was 200 on arrival. He dropped to SBP 90 after lopressor was given. He was admitted to ICU for further care. \" Pt intubated on 10/3, extubated and re-intubated on 10/5. Pt was finally extubated on 10/8/20. Prior Level of Function:  Lives With: Alone  Type of Home: Apartment  Home Layout: One level  Home Access: Elevator(6th floor)  Home Equipment: Cane   Bathroom Toilet: Standard  Bathroom Accessibility: Accessible    ADL Assistance: Independent  Ambulation Assistance: Independent  Transfer Assistance: Independent  Active : Yes  Additional Comments: Pt reports I prior to admission without AD. Restrictions/Precautions:  Restrictions/Precautions: Fall Risk  Position Activity Restriction  Other position/activity restrictions: O2, monitor BP    SUBJECTIVE: RN approved PT re-assessment. Upon entry, pt presented seated at bedside chair with wife present, pleasant and agreeable to participate in therapy. Pt was highly motivated to perform mobility tasks throughout session and is hopeful to return to [facility].  Post session, PT seated in bedside chair with all needs within reach. PAIN: denies pain    OBJECTIVE:  Bed Mobility:  Supine to Sit: Supervision  Sit to Supine: Supervision     Transfers:  Sit to Stand: Stand By Assistance, [address], cues for hand placement, to/from chair with arms  Stand to Sit:Stand By Assistance, [address], cues for hand placement, to/from chair with arms, cues to ensure pt completes turn so surface is fully behind pt prior to initiating sitting    Ambulation:  Contact Guard Assistance, with increased time for completion, pt fatigued with ambulation  Distance: 80 ft  Surface: Level Tile  Device:Rolling Walker  Gait Deviations: Forward Flexed Posture, Slow Alicia, Decreased Step Length Bilaterally, Decreased Gait Speed and Decreased Heel Strike Bilaterally,    Balance:  Static Standing Balance: Stand By Assistance  Dynamic Standing Balance: Contact Guard Assistance    Exercise:  Patient was guided in 1 set(s) 10 reps of exercise to both lower extremities. Seated marches, Seated heel/toe raises, Long arc quads and Seated isometric hip adduction. Exercises were completed for increased independence with functional mobility. Functional Outcome Measures: Completed  -PAC Inpatient Mobility without Stair Climbing Raw Score : 15  AM-PAC Inpatient without Stair Climbing T-Scale Score : 43.03    ASSESSMENT:  Assessment: Patient progressing toward established goals. Pt presents with respiratory failure and during admission was intubated and extubated twice. He has made gains in functional mobility during his admission, however actvity tolerance remains limited secondary to decreased endurance and fatigue. He continues to demonstrate a decrease in baseline functional mobility of bed mobility, transfers, and ambulation and will benefit from continued skilled PT interventions during admission and post d/c to promtoe safety and indep with functional mobility tasks.   Activity Tolerance:

## 2020-10-13 NOTE — DISCHARGE SUMMARY
recommendations for cystoscopy on discharge  7. Sleep apnea: Patient continued on home CPAP with no events overnight  8. CAD: Last heart cath on 1/20 demonstrated no significant cardiac disease at that time. Patient denied chest pain or cardiac symptoms on arrival.  Troponin was negative on arrival. Patient switched to metoprolol succinate due worsening heart failure  9. Hyperlipidemia: home medications were continued  10. GERD: home medications were continued  11. Anxiety/depression: home medications were continued  12. Essential hypertension: Patient noted to have several episodes with SBP >200. Patient started on Imdur, Entresto, Aldactone, Lasix, and hydralazine for management of heart failure and hypertension. 13. Hypernatremia(Resolved): Last sodium 138 and downtrending  14. Acute hypercapnic/hypoxic respiratory failure (resolved): Likely complicated by severe pneumonia. Patient initially required intubation following acute decompensation on 10/3/2020 and was extubated on 10/8/2020. 15. Sepsis (resolved): Secondary to severe pneumonia requiring intubation. Patient met SIRs criteria on arrival in ICU with leukocytosis, hypotension, tachycardia, to be febrile. Patient was given sepsis bolus fluids and started on empiric therapy with vancomycin and Zosyn x 7 days. 16. Severe pneumonia(resolved): There was initial concern for COVID-19 due to severity of CT scan despite a negative PCR. Patient was started on empiric therapy with  7 days IV Zosyn, Decadron IV x4 days  17. UTI (resolved): Patient was culture positive for E. coli and received 7 days therapy with Zosyn.   18. Bilateral pleural effusions (resolved): Noted on CT on 10/3/20 likely secondary to chronic heart failure with reduced ejection fraction resolved when reevaluated on chest x-ray on 10/7/20       The patient was seen and examined on day of discharge and this discharge summary is in conjunction with any daily progress note from day of patient was not following commands and not withdrawing extremities to pain. It was noted that the patient had no sensation of touch in his left arm. He rapidly improved with stop bang of sedative medications as the patient was deeply sedated on propofol and Precedex drips. CT and CTA of the head and neck were insignificant for any acute pathology and no TPA was administered. Stroke was felt to be unlikely by the neurologist. On 10/8/2020 patient was successfully extubated and remained stable on 2 L and nasal cannula thereafter and was transferred to stepdown. Neurologic exam showed no stroke like symptoms.      Patient was evaluated for ileus as KUB showed gaseous distention of the colon and small bowel. Patient underwent placement of rectal tube for acute decompression of the abdomen and reported resolution of his symptoms. Workup for celiac disease negative. A fecal calprotectin was ordered to evaluate patient for possible IBD with results outstanding. Patient was seen and evaluated by cardiology for possible post viral pericarditis and heart failure with reduced ejection fraction and worsening findings on echocardiogram.  Cardiology  Recommended starting David Sniff on discharge, LifeVest, followup in CHF clinic and repeat TTE in 3 months to evaluate improvement and potential ICD placement in future. Patient was seen and evaluated by neurology for hematuria. Aspirin and Lovenox was held while patient was symptomatic. Urology recommended serial urines to monitor hematuria and outpatient cystoscopy upon discharge.      On the evening of 10/11/2022 10/12/2020 patient experienced an uncontrolled descent to the ground and was found on the floor near bedside. Patient denied hitting his head and refused evaluation by CT. Patient was seen and evaluated by the resident at the time of the incident and was found to be in no acute distress.   A neurologic exam was performed and no neurosensory deficit was identified. Patient reported that he had dropped his TV remote on the floor and sat up to reach for it when he slipped out of bed. Patient became angered at 24-hour supervision following the event, refused CT against medical advice and refused his labs the following morning. Patient continued to be asymptomatic prior to discharge with no focal deficits noted. On the day of discharge patient was seen and evaluated at bedside and found to be in stable condition and all labs, imaging, and care plan were reviewed by the attending. Patient discharge instructions were discussed with the patient by the nursing staff. Thank you Andrei Segura DO for allowing us the opportunity to care for this patient. Exam:     Vitals:  Vitals:    10/13/20 0430 10/13/20 0755 10/13/20 0800 10/13/20 1220   BP:   123/85 121/73   Pulse:   79 64   Resp:   19 12   Temp:   98.6 °F (37 °C) 98 °F (36.7 °C)   TempSrc:   Oral Oral   SpO2:  93% 96% 95%   Weight: 221 lb (100.2 kg)      Height:         Weight: Weight: 221 lb (100.2 kg)     24 hour intake/output:    Intake/Output Summary (Last 24 hours) at 10/13/2020 1453  Last data filed at 10/13/2020 1438  Gross per 24 hour   Intake 1960 ml   Output 800 ml   Net 1160 ml         General appearance: Patient middle aged, well nourished  male   HEENT:  Normal cephalic, atraumatic without obvious deformity. Pupils equal, round, and reactive to light. Extra ocular muscles intact. Conjunctivae/corneas clear. Neck: Supple, with full range of motion. No jugular venous distention. Trachea midline. Respiratory:  Normal respiratory effort. Clear to auscultation, bilaterally without Rales/Wheezes/Rhonchi. Cardiovascular: Systolic ejection murmur noted at right upper sternal border. Regular rate and rhythm with normal S1/S2 without murmurs, rubs or gallops. Abdomen: Soft, non-tender, non-distended with normal bowel sounds.   Musculoskeletal:  No clubbing, cyanosis or edema bilaterally. Full range of motion without deformity. Skin: Skin color, texture, turgor normal.  No rashes or lesions. Neurologic:  Neurovascularly intact without any focal sensory/motor deficits. Psychiatric:  Alert and oriented, thought content appropriate, normal insight  Capillary Refill: Brisk,< 3 seconds   Peripheral Pulses: +2 palpable, equal bilaterally       Labs: For convenience and continuity at follow-up the following most recent labs are provided:      CBC:    Lab Results   Component Value Date    WBC 12.2 10/12/2020    HGB 17.8 10/12/2020    HCT 56.4 10/12/2020     10/12/2020       Renal:    Lab Results   Component Value Date     10/12/2020    K 3.7 10/12/2020    K 4.0 10/03/2020    CL 99 10/12/2020    CO2 25 10/12/2020    BUN 36 10/12/2020    CREATININE 1.2 10/12/2020    CALCIUM 10.1 10/12/2020    PHOS 3.5 10/06/2020         Significant Diagnostic Studies    Radiology:   XR ABDOMEN (KUB) (SINGLE AP VIEW)   Final Result   Paralytic ileus involving predominantly the colon. **This report has been created using voice recognition software. It may contain minor errors which are inherent in voice recognition technology. **         Final report electronically signed by Dr. Fatimah Rajan on 10/9/2020 11:10 AM      XR CHEST PORTABLE   Final Result   Impression:   Support devices in place. No focal lung consolidation. This document has been electronically signed by: Huey Pratt MD on    10/08/2020 06:27 AM         CTA HEAD W WO CONTRAST (CODE STROKE NIHSS 4 or Above)   Final Result      CT HEAD WO CONTRAST   Final Result      CTA NECK W WO CONTRAST (CODE STROKE NIHSS 4 or Above)   Final Result   Patent neck CTA. Thyroid isthmus nodule. Consider nonemergent thyroid ultrasound. This document has been electronically signed by:  Chang Sinclair MD on 10/08/2020 03:10 AM      All CT scans at this facility use dose modulation, iterative    reconstruction, and/or weight-based   dosing when appropriate to reduce radiation dose to as low as reasonably    achievable. Carotid stenosis and measurements are in accordance with the NASCET    criteria. XR CHEST PORTABLE   Final Result   Impression:   Significant improvement in bilateral consolidations. This document has been electronically signed by: Selina Sandoval MD on    10/07/2020 07:26 AM         XR CHEST PORTABLE   Final Result   Similar bilateral airspace disease. Possible left pleural effusion. Support devices as above. This document has been electronically signed by: Adiel Carrero MD on    10/06/2020 03:30 AM         XR CHEST PORTABLE   Final Result   Persistent bilateral infiltrates similar in appearance to prior study. Continued progress imaging is advised. Support lines and tubes in satisfactory position. **This report has been created using voice recognition software. It may contain minor errors which are inherent in voice recognition technology. **      Final report electronically signed by Dr. Kelsi Mendes on 10/5/2020 2:34 PM      XR CHEST PORTABLE   Final Result   Impression:   1. Tubes and line are as described above. 2.  Bilateral diffuse airspace disease appears similar to the prior study    and may represent pulmonary edema versus atypical pneumonia      This document has been electronically signed by: Lord Jose Luis MD on    10/04/2020 04:28 AM         CTA CHEST [address]   Final Result   1. The distal tip of the endotracheal tube is 3.3 cm above the level of the ila. 2. The esophageal tube extends to the stomach. 3. There is a right jugular central venous catheter with the distal tip within the superior vena cava. 4. There are small bilateral pleural effusions, right greater than left. There is infiltrate throughout both lung fields with additional consolidation within the mid and lower chest. Clinical management is recommended. Follow-up chest radiographs are    also recommended to confirm complete resolution. 5. There are mediastinal and hilar lymph nodes which may be reactive related to the inflammatory process. A follow-up CT examination of the chest with intravenous contrast in 3 months is recommended to confirm stability/resolution. 6. There is no pulmonary embolus. **This report has been created using voice recognition software. It may contain minor errors which are inherent in voice recognition technology. **      Final report electronically signed by Dr. Nando Harley on 10/3/2020 8:14 PM      XR CHEST PORTABLE   Final Result   1. The distal tip of the endotracheal tube is 5.2 cm above the level of the ila. 2. The esophageal tube extends into the stomach and off the inferior aspect of the image. 3. There is a right subclavian central venous catheter with the distal tip overlying the superior vena cava. There is no pneumothorax. 4. There are infiltrates throughout both lung fields with a predominantly perihilar distribution. There is no pleural effusion. **This report has been created using voice recognition software. It may contain minor errors which are inherent in voice recognition technology. **      Final report electronically signed by Dr. Nando Harley on 10/3/2020 7:27 PM             Consults:     IP CONSULT TO DIETITIAN  IP CONSULT TO DIETITIAN  IP CONSULT TO SOCIAL WORK  IP CONSULT TO PHYSICAL MEDICINE REHAB  IP CONSULT TO REHAB/TCU ADMISSION COORDINATOR  IP CONSULT TO CARDIOLOGY  IP CONSULT TO HEART FAILURE NURSE/COORDINATOR  IP CONSULT TO UROLOGY    Disposition: Home  Condition at Discharge: Stable    Code Status:  Full Code     Patient Instructions:    Discharge lab work: BMP  Activity: activity as tolerated  Diet: DIET GENERAL; Carb Control: 3 carb choices (45 gms)/meal; Low Sodium (2 GM);  Daily Fluid Restriction: 2000 ml      Follow-up visits:   KRYSTAL Carlson - CNP  [address] [address] [facility]  [address]    On 10/21/2020  urology, your appointment time is at 1:15 pm, bring medications, photo id, & insurance card, please arrive 15 minutes prior to appointment time     [employer]  [address] [facility] [address]  [address] [place] [address] , APRN - CNP  [address]  [phone]    On 10/26/2020  @ 830 am second floor [address], APRN - CNP  [address] [facility] [place] [address]  [phone]    Go on 11/19/2020  @10:30AM , For cardiology follow-up    Niles Coppola, DO  [address]  [phone]    On 10/28/2020  Appointment time is: 1: 40 pm and this is 1hr long appointment, Please arrive 15 minutes early, Please bring photo ID, insurance card, medications         Discharge Medications:      Meseret Perry   Home Medication Instructions PDN:[id number]    Printed on:10/13/20 4049   Medication Information                      albuterol sulfate  (90 Base) MCG/ACT inhaler  Inhale 2 puffs into the lungs every 6 hours as needed for Wheezing             ammonium lactate (AMLACTIN) 12 % cream  Apply topically as needed for Dry Skin Apply topically as needed. aspirin 81 MG EC tablet  Take 81 mg by mouth daily             atorvastatin (LIPITOR) 10 MG tablet  Take 10 mg by mouth 3 times daily Take one tablet by mouth three times daily             baclofen (LIORESAL) 10 MG tablet  Take 10 mg by mouth daily              blood glucose test strips (TRUE METRIX BLOOD GLUCOSE TEST) strip  1 each by In Vitro route daily As needed.              busPIRone (BUSPAR) 5 MG tablet  Take 5 mg by mouth 3 times daily              dapagliflozin (FARXIGA) 5 MG tablet  Take 1 tablet by mouth every morning             dicyclomine (BENTYL) 20 MG tablet  Take 1 tablet by mouth every 6 hours as needed (abdominal cramping)             dutasteride (AVODART) 0.5 MG capsule  Take 0.5 mg by mouth daily             fluticasone-salmeterol (ADVAIR DISKUS) 250-50 MCG/DOSE AEPB  Inhale 1 puff into the lungs every 12 hours             furosemide (LASIX) 20 MG tablet  Take 1 tablet by mouth daily             Handicap Placard MISC  by Does not apply route             homatropine 5 % ophthalmic solution  1 drop every hour              hydrALAZINE (APRESOLINE) 10 MG tablet  Take 1 tablet by mouth 2 times daily             insulin aspart (NOVOLOG FLEXPEN) 100 UNIT/ML injection pen  Inject into the skin 3 times daily (before meals) Sliding scale (max 36 units/day)             insulin glargine (TOUJEO SOLOSTAR) 300 UNIT/ML injection pen  Inject 75 Units into the skin 2 times daily              isosorbide mononitrate (IMDUR) 30 MG extended release tablet  Take 1 tablet by mouth daily             Liraglutide (VICTOZA) 18 MG/3ML SOPN SC injection  Inject 1.2 mg into the skin daily             metoprolol succinate (TOPROL XL) 50 MG extended release tablet  Take 3 tablets by mouth 2 times daily             modafinil (PROVIGIL) 100 MG tablet  Take 100 mg by mouth daily. .             Multiple Vitamins-Minerals (MULTIVITAMIN ADULT PO)  Take 1 tablet by mouth daily             omeprazole (PRILOSEC) 20 MG delayed release capsule  Take 20 mg by mouth every other day              polyethylene glycol (GLYCOLAX) 17 g packet  Take 17 g by mouth daily as needed for Constipation             pregabalin (LYRICA) 75 MG capsule  Take 75 mg by mouth 2 times daily. Violet Mancilla Respiratory Therapy Supplies (NEBULIZER/ADULT MASK) KIT  by Does not apply route             sacubitril-valsartan (ENTRESTO) 49-51 MG per tablet  Take 1 tablet by mouth 2 times daily             spironolactone (ALDACTONE) 25 MG tablet  Take 0.5 tablets by mouth daily             tamsulosin (FLOMAX) 0.4 MG capsule  Take 0.4 mg by mouth daily             Testosterone (TESTOPEL) 75 MG PLLT  by Implant route. Violet Mancilla              Umeclidinium Bromide (INCRUSE ELLIPTA) 62.5 MCG/INH AEPB  inhalation daily             zolpidem (AMBIEN) 10 MG tablet  Take by mouth nightly as needed for Sleep. .                 Time Spent on discharge is more than 1 hour in the examination, evaluation, counseling and review of medications and discharge plan. Signed: Thank you Karyle Colder, DO for the opportunity to be involved in this patient's care.     Electronically signed by Karyle Colder, DO on 10/13/2020 at 2:53 PM

## 2020-10-13 NOTE — PROGRESS NOTES
Discharge teaching and instructions for diagnosis/procedure of COPD completed with patient using teachback method. AVS reviewed. Prescriptions given to patient. Patient voiced understanding regarding prescriptions, follow up appointments, and care of self at home. Discharged in a wheelchair to  home with support per family. IV taken out. Heart monitor taken off. Patient discharged with documented belongings. Patient walker delivered.

## 2020-10-13 NOTE — CARE COORDINATION
DISCHARGE/PLANNING EVALUATION  10/13/20, 10:59 AM EDT    Reason for Referral:  \"therapy recommends SNF\"  Mental Status:  Alert and oriented   Decision Making:  Makes his own decisions  Family/Social/Home Environment:  SW spoke to patient and his wife, Leona Chaidez, whom he states they are . He lives alone at [employer]. There is an elevator. The bathroom is handicapped to an extent in that he has a shower seat and grab bars in the shower but the shower is in a bathtub. He does not use any DME to ambulate. He states he does all housekeeping and still drives. He has 2 children but only one in the area and not helpful. Current Services including food security, transportation and housekeeping:   See above  Current Equipment:  none  Payment Source:  [facility] Medicare and Medicaid  Concerns or Barriers to Discharge: We discussed options such rehab but he does not want to do this as he has been away from home so long. Therapy did speak to this SW and feels [place] therapy would be good for him. We discussed options for [facility] and he prefers [facility], SW spoke to Felicia Agosto with this agency, but they do not have a contract with them. He then had no preference and agreed to [id number] acute provider list with quality measures, geographic area and applicable managed care information provided. Questions regarding selection process answered: not at this time    Teach Back Method used with patient regarding care plan and wife  Patient verbalize understanding of the plan of care and contribute to goal setting. Patient goals, treatment preferences and discharge plan:  Plan home alone, wife to assist as needed, she will transport home, and [facility]. SW made referral, spoke to [facility] [employer].     Electronically signed by ANGELA Lopez on 10/13/2020 at 10:59 AM

## 2020-10-13 NOTE — PLAN OF CARE
Problem: Urinary Elimination:  Goal: Signs and symptoms of infection will decrease  Description: Signs and symptoms of infection will decrease  Outcome: Ongoing  Note: Pt remains afebrile during the night. Continue to monitor. Problem: Skin Integrity:  Goal: Absence of new skin breakdown  Description: Absence of new skin breakdown  Outcome: Ongoing  Note: No new skin breakdown noted. Continue q2hr turns, supporting bony prominences with pillows, elevating the foot of the bed, and elevating heels off the bed. Problem: Falls - Risk of:  Goal: Will remain free from falls  Description: Will remain free from falls  Outcome: Ongoing  Note: Pt remains free of falls during the night. Bed in the lowest position, items and call light placed within reach, side rails up x2, and bed alarm placed on. Copntinue hourly rounding. Problem: Nutrition  Goal: Optimal nutrition therapy  10/13/2020 0207 by [id number] [place],[address]  Outcome: Ongoing  Note: Pt has optimal nutritional therapy during the day of care. Problem: Discharge Planning:  Goal: Discharged to appropriate level of care  Description: Discharged to appropriate level of care  Outcome: Ongoing  Note: Pt is to be D/C home when medically stable. Problem: Pain:  Goal: Pain level will decrease  Description: Pain level will decrease  Outcome: Ongoing  Note: Pt does not complain of pain during the night. Continue to rest,reposition, and assess pain with vital signs. Problem: Musculor/Skeletal Functional Status  Goal: Absence of falls  Outcome: Ongoing  Note: Pt remains absent of falls during the night.

## 2020-10-14 NOTE — PROGRESS NOTES
CLINICAL PHARMACY NOTE: MEDS TO [address] Select Patient?: No  Total # of Prescriptions Filled: 9   The following medications were delivered to the patient:  Entresto 49-51 mg  Furosemide 20 mg  Dicyclomine 20 mg  Clearlax  Hydralazine 10 mg  Metoprolol ER 50 mg  Isosorbide ER 30 mg  Spironolactone 25 mg  Invokana 100 mg  Total # of Interventions Completed: 2  Time Spent (min): 30    Additional Documentation:

## 2020-10-21 PROBLEM — U07.1 ACUTE RESPIRATORY FAILURE DUE TO COVID-19 (HCC): Status: ACTIVE | Noted: 2020-01-01

## 2020-10-21 PROBLEM — J96.00 ACUTE RESPIRATORY FAILURE DUE TO COVID-19 (HCC): Status: ACTIVE | Noted: 2020-01-01

## 2020-10-21 NOTE — ED NOTES
ED to inpatient nurses report    Chief Complaint   Patient presents with    Diarrhea    Nausea      Present to ED from home  LOC: alert and orientated to name, place, date  Vital signs   Vitals:    10/21/20 1527 10/21/20 1740   BP: 137/87 (!) 148/90   Pulse: 112 110   Resp: 21 28   Temp: 99.5 °F (37.5 °C)    TempSrc: Oral    SpO2: 93% 94%   Weight: 216 lb 14.4 oz (98.4 kg)    Height: 5' 10\" (1.778 m)       Oxygen Baseline RA    Current needs required 6L n/c Bipap/Cpap Yes (Home)  LDAs:   Peripheral IV 10/21/20 Left Antecubital (Active)     Mobility: Requires assistance * 1  Pending ED orders: Stool sample  Present condition: STABLE    Electronically signed by Gina Car RN on 10/21/2020 at 6:34 PM       Gina Car [facility]  10/21/20 [address]  [address]

## 2020-10-21 NOTE — ED PROVIDER NOTES
[address] [facility] COMPLAINT    Chief Complaint   Patient presents with    Diarrhea    Nausea       Nurses Notes reviewed and I agree except as noted in the HPI. HPI    Julia Goodwin is a 76 y.o. male who presents for evaluation of decreased appetite, fatigue, diarrhea, and vomiting. Pt states he was discharged from [facility] 10/13/20 after he was intubated for 5 days due to SOB  pneumonia , UTI and respiratory distress. The patient got an IV antibiotic and finish the course during the patient's hospital stay. However he states that he has been having diarrhea while being in the hospital and persisted up to present. Diarrhea is about 4 or 5 times a day, patient had chills regularly no chills. Patient had called the ambulance today and his saturation is 86% at room air for which she was given 4 L nasal cannula. . Pt hx CHF, pt wears life vest, diabetes, and COPD. REVIEW OF SYSTEMS    Review of Systems   Constitutional: Positive for chills, fatigue and fever. Negative for appetite change and diaphoresis. HENT: Negative for congestion, ear discharge, ear pain, postnasal drip, rhinorrhea, sinus pressure, sore throat, trouble swallowing and voice change. Respiratory: Positive for cough, shortness of breath and wheezing. Negative for chest tightness. Cardiovascular: Negative for chest pain, palpitations and leg swelling. Gastrointestinal: Positive for diarrhea and nausea. Negative for abdominal pain, constipation and vomiting. Musculoskeletal: Negative for arthralgias, back pain, joint swelling, myalgias, neck pain and neck stiffness. Skin: Negative for rash. Neurological: Negative for dizziness, syncope, weakness, light-headedness, numbness and headaches.        PAST MEDICAL HISTORY     has a past medical history of Acid reflux, Anxiety, Arthritis, Asthma, CHF (congestive heart failure) ([facility] [address] Constipation, COPD (chronic obstructive pulmonary disease) ([facility] [address] Depression, Diarrhea, Hyperlipidemia, Hypertension, Loss of vision, Migraine, Neuralgia, Pneumonia, Shortness of breath, Sleep apnea, Testosterone deficiency in male, and Type 2 diabetes mellitus ([facility] [address] SURGICAL HISTORY   has a past surgical history that includes hernia repair; Eye surgery (Right, 08/2019); and Cardiac catheterization. CURRENT MEDICATIONS    Previous Medications    ALBUTEROL SULFATE  (90 BASE) MCG/ACT INHALER    Inhale 2 puffs into the lungs every 6 hours as needed for Wheezing    AMMONIUM LACTATE (AMLACTIN) 12 % CREAM    Apply topically as needed for Dry Skin Apply topically as needed. ASPIRIN 81 MG EC TABLET    Take 81 mg by mouth daily    BACLOFEN (LIORESAL) 10 MG TABLET    Take 10 mg by mouth daily     BLOOD GLUCOSE TEST STRIPS (TRUE METRIX BLOOD GLUCOSE TEST) STRIP    1 each by In Vitro route daily As needed.     CANAGLIFLOZIN (INVOKANA) 100 MG TABS TABLET    Take 1 tablet by mouth every morning (before breakfast)    DICYCLOMINE (BENTYL) 20 MG TABLET    Take 1 tablet by mouth every 6 hours as needed (abdominal cramping)    DUTASTERIDE (AVODART) 0.5 MG CAPSULE    Take 0.5 mg by mouth daily    FLUTICASONE-SALMETEROL (ADVAIR DISKUS) 250-50 MCG/DOSE AEPB    Inhale 1 puff into the lungs every 12 hours    FUROSEMIDE (LASIX) 20 MG TABLET    Take 1 tablet by mouth daily    HANDICAP PLACARD MISC    by Does not apply route    HOMATROPINE 5 % OPHTHALMIC SOLUTION    1 drop every hour     HYDRALAZINE (APRESOLINE) 10 MG TABLET    Take 1 tablet by mouth 2 times daily    INSULIN ASPART (NOVOLOG FLEXPEN) 100 UNIT/ML INJECTION PEN    Inject into the skin 3 times daily (before meals) Sliding scale (max 36 units/day)    INSULIN GLARGINE (TOUJEO SOLOSTAR) 300 UNIT/ML INJECTION PEN    Inject 75 Units into the skin 2 times daily     ISOSORBIDE MONONITRATE (IMDUR) 30 MG EXTENDED RELEASE TABLET    Take 1 tablet by mouth daily    LIRAGLUTIDE (VICTOZA) 18 MG/3ML SOPN SC INJECTION    Inject 1.2 mg into the skin daily    METOPROLOL SUCCINATE (TOPROL XL) 50 MG EXTENDED RELEASE TABLET    Take 3 tablets by mouth 2 times daily    MODAFINIL (PROVIGIL) 100 MG TABLET    Take 100 mg by mouth daily. .    MULTIPLE VITAMINS-MINERALS (MULTIVITAMIN ADULT PO)    Take 1 tablet by mouth daily    OMEPRAZOLE (PRILOSEC) 20 MG DELAYED RELEASE CAPSULE    Take 20 mg by mouth every other day     POLYETHYLENE GLYCOL (GLYCOLAX) 17 G PACKET    Take 17 g by mouth daily as needed for Constipation    PREGABALIN (LYRICA) 75 MG CAPSULE    Take 75 mg by mouth 2 times daily. .    RESPIRATORY THERAPY SUPPLIES (NEBULIZER/ADULT MASK) KIT    by Does not apply route    SACUBITRIL-VALSARTAN (ENTRESTO) 49-51 MG PER TABLET    Take 1 tablet by mouth 2 times daily    SPIRONOLACTONE (ALDACTONE) 25 MG TABLET    Take 0.5 tablets by mouth daily    TAMSULOSIN (FLOMAX) 0.4 MG CAPSULE    Take 0.4 mg by mouth daily    TESTOSTERONE (TESTOPEL) 75 MG PLLT    by Implant route. Crystal Lesly UMECLIDINIUM BROMIDE (INCRUSE ELLIPTA) 62.5 MCG/INH AEPB    inhalation daily    ZOLPIDEM (AMBIEN) 10 MG TABLET    Take by mouth nightly as needed for Sleep. .       ALLERGIES    has No Known Allergies. FAMILY HISTORY    He indicated that his mother is . He indicated that his father is . He indicated that the status of his neg hx is unknown.   family history is not on file. SOCIAL HISTORY     reports that he quit smoking about 28 years ago. His smoking use included cigarettes. He started smoking about 43 years ago. He has a 15.00 pack-year smoking history. He has never used smokeless tobacco. He reports previous alcohol use. He reports that he does not use drugs.     PHYSICAL EXAM      INITIAL VITALS: /81   Pulse 74   Temp 97.9 °F (36.6 °C) (Oral)   Resp 20   Ht 5' 10\" (1.778 m)   Wt 216 lb 14.4 oz (98.4 kg)   SpO2 97%   BMI 31.12 kg/m² Estimated body mass index is 31.12 kg/m² as calculated from the following: **This report has been created using voice recognition software. It may contain minor errors which are inherent in voice recognition technology. **      Final report electronically signed by Dr. Enrico Ghosh on 10/22/2020 7:01 AM      XR CHEST PORTABLE   Final Result   1. Bibasilar atelectasis/infiltrate. 2. Mild stable cardiomegaly. **This report has been created using voice recognition software. It may contain minor errors which are inherent in voice recognition technology. **      Final report electronically signed by Dr. Mikki Sams on 10/21/2020 4:37 PM          LABS:   Labs Reviewed   CULTURE, REFLEXED, URINE - Abnormal; Notable for the following components:       Result Value    Organism Escherichia coli (*)     All other components within normal limits    Narrative:     Source: urine       Site: unknown collect          Current Antibiotics: Cefepime   PROCALCITONIN - Abnormal; Notable for the following components:    Procalcitonin 0.36 (*)     All other components within normal limits   CBC WITH AUTO DIFFERENTIAL - Abnormal; Notable for the following components:    MCHC 31.0 (*)     RDW-CV 18.3 (*)     RDW-SD 51.1 (*)     Segs Absolute 8.6 (*)     Lymphocytes Absolute 0.8 (*)     All other components within normal limits   BASIC METABOLIC PANEL - Abnormal; Notable for the following components:    Chloride 95 (*)     Glucose 256 (*)     BUN 23 (*)     CREATININE 1.3 (*)     All other components within normal limits   HEPATIC FUNCTION PANEL - Abnormal; Notable for the following components:    AST 77 (*)     Total Protein 8.1 (*)     All other components within normal limits   BETA-HYDROXYBUTYRATE - Abnormal; Notable for the following components:    Beta-Hydroxybutyrate 20.14 (*)     All other components within normal limits   COVID-19 - Abnormal; Notable for the following components:    SARS-CoV-2, NAAT DETECTED (*)     All other components within normal limits   GLOMERULAR FILTRATION RATE, ESTIMATED - Abnormal; Notable for the following components:    Est, Glom Filt Rate 67 (*)     All other components within normal limits   URINE WITH REFLEXED MICRO - Abnormal; Notable for the following components:    Glucose, Ur >= 1000 (*)     Ketones, Urine 15 (*)     Blood, Urine TRACE (*)     Protein,  (*)     Nitrite, Urine POSITIVE (*)     Leukocyte Esterase, Urine TRACE (*)     All other components within normal limits   CBC WITH AUTO DIFFERENTIAL - Abnormal; Notable for the following components:    MCHC 30.7 (*)     RDW-CV 18.3 (*)     RDW-SD 52.8 (*)     Segs Absolute 8.5 (*)     Lymphocytes Absolute 0.9 (*)     Immature Grans (Abs) 0.08 (*)     All other components within normal limits   COMPREHENSIVE METABOLIC PANEL W/ REFLEX TO MG FOR LOW K - Abnormal; Notable for the following components:    Glucose 249 (*)     CREATININE 1.5 (*)     BUN 29 (*)     Sodium 134 (*)     CO2 19 (*)     AST 76 (*)     Alb 3.4 (*)     All other components within normal limits   All other unresulted laboratory test above are normal:    Vitals:    Vitals:    10/21/20 1527   BP: 137/87   Pulse: 112   Resp: 21   Temp: 99.5 °F (37.5 °C)   TempSrc: Oral   SpO2: 93%   Weight: 216 lb 14.4 oz (98.4 kg)   Height: 5' 10\" (1.778 m)       EMERGENCY DEPARTMENT COURSE:    Medications   0.9 % sodium chloride infusion (has no administration in time range)       The pt was seen and evaluated by me. Within the department, I observed the pt's vitalsigns to be within acceptable range except for the low-grade fever and mild tachycardia likely from fever and dehydration. Laboratory and Radiological studies were performed, results were reviewed with the patient. Within the department, the pt was treated with IV fluid hydration and DuoNeb nebulizer. The case is referred to the hospitalist services, Dr. Carolina Morel who graciously admitted the patient .  I observed the pt's condition to be hemodynamically stable during the duration of their stay. I explained my proposed course of treatment to the pt, and they were amenable to my decision. CRITICAL CARE:   None. CONSULTS:  Hospitalist Dr. Lake Hams:  None. FINAL IMPRESSION       1. Dyspnea and respiratory abnormalities    2. Pneumonia due to 2019-nCoV    3. Urinary tract infection without hematuria, site unspecified    4. Diarrhea, unspecified type    5. Type 2 diabetes mellitus with other specified complication, unspecified whether long term insulin use ([facility] [address]    6. Chronic obstructive pulmonary disease, unspecified COPD type ([facility]          DISPOSITION/PLAN  PATIENT REFERRED TO:  Patient is admitted to the hospitalist Dr Kip Faith graciously admitted the patient          (Please note that portions of this note were completed with a voice recognition program and electronically transcribed. Efforts were [facility] edit the dictations but occasionally words are mis-transcribed . The transcription may contain errors not detected in proofreading.   This transcription was electronically signed.)     10/25/20 8:09 AM        Jose Sarabia MD      Emergency room physician           Jose Sarabia MD  10/28/20 0848

## 2020-10-21 NOTE — TELEPHONE ENCOUNTER
Also advised wife to contact [facility] as she is concerned about the Entresto , Hydralazine and Lasix causing the weakness and lose of appetite.

## 2020-10-21 NOTE — PROGRESS NOTES
Attempted to call pts wife for telephone visit, for hospital follow up. Pt in ER for weakness. No visit done today.

## 2020-10-21 NOTE — ED NOTES
Pt and wife updated on POC. Patient resting in bed. Respirations easy and unlabored. No distress noted. Call light within reach. Will continue to monitor.      Veronica Lindsay RN  10/21/20 [id number]

## 2020-10-21 NOTE — TELEPHONE ENCOUNTER
Spoke with wife and she canceled appointment for today and she is either calling the squad to take Olman Murdock to the ER or if she can get him to the car she will take him.

## 2020-10-21 NOTE — H&P
HISTORY & PHYSICAL       Patient:  Kevin Jarquin  YOB: 1955    MRN: [id number]     Acct: [de-identified]    PCP: KRYSTAL Douglas CNP    Date of Admission: 10/21/2020    Date of Service: Pt seen/examined on 10/21/20 and Admitted to Inpatient with expected LOS greater than two midnights due to medical therapy. ASSESSMENT and PLAN:    Acute hypoxic respiratory failure likely due to COVID-19 infection with possible pneumonia  -Recent admission for same reason, requiring intubation on last admission 10/3/2020   -Patient hypoxic to 89% in transit to ED  -Currently saturating well on 6 L O2 via nasal cannula, having mild tachypnea and tachycardia. Maintain spO2 at least 93%  -CXR in ER: Bibasilar atelectasis/infiltrate  -procal elevated at 0.36  -Monitor for signs of distress  -Currently no signs of acute distress, no accessory muscle usage or increased work of breathing  -continue home CPAP ordered at night  -Wean O2 as tolerated  -please see below for management of COVID-19 infection   -hold for chest CTA for now due to recent DAI last admission, re-assess tomorrow, if requiring more O2, consider chest CTA    COVID-19 with possible pneumonia  -Chest x-ray with bilateral atelectasis versus infiltrate  -COVID-19 positive in the ED  -procalcitonin elevated at 0.36  -With recent hospitalization and intubation will cover for possible hospital-acquired pneumonia  -Broad-spectrum antibiotic coverage with cefepime and vancomycin. Pharmacy consult to dose vancomycin, appreciate input.  -on 6 lpm O2, will give convalescent plasma, remdesivir and decadron for COVID-19 (PPI for GI prophylaxis, start SSI and Accu-Chek while on Decadron). Most isolated mildly elevated AST, otherwise normal LFTs, will continue to monitor given that we're restarting remdesivir.   Pharmacy consult to dose remdesivir, appreciate input.  -Placed in droplet precautions  -Lovenox for thrombosis prophylaxis.   -We will start zinc, vitamin D, vitamin C, lactobacillus  -check D-dimer and ABG     Nausea, vomiting, diarrhea, decreased appetite likely secondary to COVID-19 infection  -Gentle IV fluids in the ED  -Limited ultrasound of the abdomen to rule out ascites  -Mildly elevated AST  -Supportive care  -c diff pending   -start lactobacillus     4. Sinus tachycardia likely due to hypoxia and COVID-19 infection    -EKG  -tele monitor     5. 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 Asymptomatic bacteriuria  UA on arrival positive nitrite, leukocyte esterase, WBC 25-50  -No UTI symptoms currently  -Denies dysuria, frequency  -UA with trace blood, positive nitrites and leukocyte estrase  -IV cefepime and vancomycin started as above. -Doubt that this is true UTI, as patient was recently treated with 7 days of IV Zosyn  -Monitor symptoms    7. Isolated AST elavtion: LFTs in am     8. Recent discharge from [facility] on 10/13/2020 for severe PNA  -Initially requiring intubation on 10/3/2020 with extubation on 10/8/2020  -Secondary to severe pneumonia with sepsis, underwent IV Zosyn for 7 days and IV Decadron for 10 days. -COVID-19 negative at that time  -Weaned to room air and discharged home on 10/13/2020    9. Chronic HFrEF  -No signs of acute exacerbation currently  -Recent echocardiogram with ejection fraction of 25 to 30% and global hypokinesis of left ventricle  -Continue GDMT, continue Farxiga, Entresto, Aldactone, beta-blocker, lasix   -Currently wearing LifeVest  -pro-BNP normal       10.  Diabetes mellitus type 2 with hyperglycemia  -Last hemoglobin A1c of 6.7 in July 2020  -On Toujeo 75 units twice daily, substitute to lantus 60 units twice daily and medium dose sliding scale insulin  -Urine ketones and beta hydroxybutyrate checked in the ER, no acidosis noted  -Low-carb diet  -Accu-Chek    11. COPD/asthma  -Continue home inhalers  -No wheezing currently    12. Hx of JOSE A  -Continue home CPAP  -Bleed in O2 as necessary    13. Hx of CAD  -Significant history of CAD in the past  -Most recent cardiac cath in January 2020 with no significant CAD found  -GDMT for congestive heart failure    14. Hyperlipidemia  -Continue statin    15. GERD  -Continue PPI    16. Essential hypertension  -Continue home blood pressure medications    Chief Complaint: Fatigue, diarrhea, loss of appetite      History Of Present Illness:      76 y.o. male who presented to [address] [place] [address] with several day history of worsening fatigue, diarrhea, vomiting. Patient was recently discharged on 10/13/2020 from [facility] for severe pneumonia and sepsis. Patient was initially intubated on presentation and extubated 5 days later. Patient was given IV Zosyn x 7 days for severe pneumonia and improved rapidly. Patient was able to be discharged home on 10/13/2020 as he was no longer requiring antibiotic therapy or oxygen therapy. While at home patient states that he became very fatigued and had a very poor appetite. He states that he has been having diarrhea on and off  and productive cough since 10/13/2020 and has been having a hard time keeping food down recently. He states that he is also been having some abdominal pain and bloating with this as well. Patient states that today he became very short of breath while at home and very weak, emergency squad was called and patient was found to be hypoxic down to 89% while in transport to the ED. he was placed on 4 L of O2 via nasal cannula. In the ED patient was swabbed for COVID-19 infection which was positive.   Patient continued to be hypoxic and required increased to 6 L O2 via nasal cannula. Patient also was noted to have an elevated procalcitonin to 0.36, mildly elevated AST to 77, and abnormal urinalysis with positive nitrites and leukocyte estrase and trace blood. His creatinine was mildly elevated at 1.3. Troponin negative, BNP negative. Patient denied any chest pain, swelling in his legs. He admits to shortness of breath, abdominal pain, diarrhea, vomiting, fatigue. Patient was admitted to hospitalist service for further management of his acute hypoxic respiratory failure secondary to COVID-19 infection with possible superimposed hospital-acquired pneumonia. Past Medical History:          Diagnosis Date    Acid reflux     Anxiety     Arthritis     in foot     Asthma     Constipation     COPD (chronic obstructive pulmonary disease) ([facility])     Depression     Diarrhea     Hyperlipidemia     Hypertension     Loss of vision     Migraine     Neuralgia     Shortness of breath     Sleep apnea     Testosterone deficiency in male     Type 2 diabetes mellitus ([facility] [address]        Past Surgical History:          Procedure Laterality Date    CARDIAC CATHETERIZATION      EYE SURGERY Right 08/2019    HERNIA REPAIR         Medications Prior to Admission:      Prior to Admission medications    Medication Sig Start Date End Date Taking?  Authorizing Provider   sacubitril-valsartan (ENTRESTO) 49-51 MG per tablet Take 1 tablet by mouth 2 times daily 10/13/20  Yes Luis E Melgar, DO   metoprolol succinate (TOPROL XL) 50 MG extended release tablet Take 3 tablets by mouth 2 times daily 10/13/20  Yes Luis E Melgar DO   spironolactone (ALDACTONE) 25 MG tablet Take 0.5 tablets by mouth daily 10/14/20  Yes Luis E Melgar DO   dicyclomine (BENTYL) 20 MG tablet Take 1 tablet by mouth every 6 hours as needed (abdominal cramping) 10/13/20  Yes Joselo Melgar, DO   isosorbide mononitrate (IMDUR) 30 MG extended release tablet Take 1 tablet by mouth daily 10/14/20  Yes Luis E Melgar, DO   hydrALAZINE SC injection Inject 1.2 mg into the skin daily   Yes Historical Provider, MD   ammonium lactate (AMLACTIN) 12 % cream Apply topically as needed for Dry Skin Apply topically as needed. Historical Provider, MD   insulin aspart (NOVOLOG FLEXPEN) 100 UNIT/ML injection pen Inject into the skin 3 times daily (before meals) Sliding scale (max 36 units/day)    Historical Provider, MD   zolpidem (AMBIEN) 10 MG tablet Take by mouth nightly as needed for Sleep. Kelsey Richards Historical Provider, MD   Handicap Placard MISC by Does not apply route    Historical Provider, MD   Respiratory Therapy Supplies (NEBULIZER/ADULT MASK) KIT by Does not apply route    Historical Provider, MD   Testosterone (TESTOPEL) 75 MG PLLT by Implant route. Kelsey Richards Historical Provider, MD   blood glucose test strips (TRUE METRIX BLOOD GLUCOSE TEST) strip 1 each by In Vitro route daily As needed. Historical Provider, MD       Allergies:  Patient has no known allergies. Social History:      The patient currently lives at home with wife    TOBACCO:   reports that he quit smoking about 28 years ago. His smoking use included cigarettes. He started smoking about 43 years ago. He has a 15.00 pack-year smoking history. He has never used smokeless tobacco.  ETOH:   reports previous alcohol use. Family History:      Positive as follows:        Problem Relation Age of Onset    Colon Cancer Neg Hx     Colon Polyps Neg Hx     Liver Cancer Neg Hx     Esophageal Cancer Neg Hx     Rectal Cancer Neg Hx     Stomach Cancer Neg Hx        Diet:  DIET CARB CONTROL;    REVIEW OF SYSTEMS:   Pertinent positives as noted in the HPI. All other systems reviewed and negative. Review of Systems - Negative except for stated in HPI. Review of Systems   Constitutional: Positive for fatigue. Negative for chills and fever. HENT: Negative for ear pain, postnasal drip and trouble swallowing. Eyes: Negative for pain and visual disturbance.    Respiratory: Positive for shortness of breath. Negative for cough. Cardiovascular: Negative for chest pain and palpitations. Gastrointestinal: Positive for abdominal pain, diarrhea, nausea and vomiting. Negative for blood in stool and constipation. Genitourinary: Negative for dysuria and urgency. Skin: Negative for rash and wound. Neurological: Negative for dizziness and headaches. Psychiatric/Behavioral: Negative for dysphoric mood. The patient is not nervous/anxious. PHYSICAL EXAM:    BP (!) 148/90   Pulse 110   Temp 99.5 °F (37.5 °C) (Oral)   Resp 28   Ht 5' 10\" (1.778 m)   Wt 216 lb 14.4 oz (98.4 kg)   SpO2 94%   BMI 31.12 kg/m²     General appearance:  No apparent distress, appears stated age and cooperative. Obese, lifevest in place. Saturating well on 6 L O2 via nasal cannula. HEENT:  Normal cephalic, atraumatic without obvious deformity. Pupils equal, round, and reactive to light. Extra ocular muscles intact. Conjunctivae/corneas clear. Neck: Supple, with full range of motion. No jugular venous distention. Trachea midline. Respiratory:  on 6 lpm O2 via NC. Diminished air enrty on lung bases. No rals, no rhonchi, no wheezing. Cardiovascular:  normal rate and rhythm with normal S1/S2 without murmurs, rubs or gallops. Abdomen: Soft, non-tender, mildly distended with normal bowel sounds  Musculoskeletal:  No clubbing, cyanosis or edema bilaterally. Full range of motion without deformity. Skin: Skin color, texture, turgor normal.  No rashes or lesions. Neurologic:  Neurovascularly intact without any focal sensory/motor deficits.  Cranial nerves: II-XII intact, grossly non-focal.  Psychiatric:  Alert and oriented, thought content appropriate, normal insight  Capillary Refill: Brisk,< 3 seconds   Peripheral Pulses: +2 palpable, equal bilaterally  Exam of extremities: peripheral pulses normal, no pedal edema, no clubbing or cyanosis       Labs:     Recent Labs     10/19/20  1247 10/21/20  1600   WBC 8.5 10.0 HGB 14.9 15.7   HCT 49.7 50.7   PLT See Reflexed IPF Result 194     Recent Labs     10/19/20  1242 10/21/20  1600    135   K 3.8 4.8    95*   CO2 23 24   BUN 11 23*   CREATININE 1.41* 1.3*   CALCIUM 8.7 9.5     Recent Labs     10/21/20  1600   AST 77*   ALT 42   BILIDIR 0.3   BILITOT 1.1   ALKPHOS 126     No results for input(s): INR in the last 72 hours. No results for input(s): Adrien Davenport in the last 72 hours. Urinalysis:      Lab Results   Component Value Date    NITRU POSITIVE 10/21/2020    WBCUA 25-50 10/21/2020    BACTERIA MANY 10/21/2020    RBCUA 0-2 10/21/2020    BLOODU TRACE 10/21/2020    SPECGRAV >1.030 10/08/2020    GLUCOSEU >= 1000 10/21/2020       Intake & Output:  No intake/output data recorded. I/O this shift:  In: 150.1 [P.O.:50; I.V.:100.1]  Out: -       Radiology:       XR CHEST PORTABLE   Final Result   1. Bibasilar atelectasis/infiltrate. 2. Mild stable cardiomegaly. **This report has been created using voice recognition software. It may contain minor errors which are inherent in voice recognition technology. **      Final report electronically signed by Dr. Aurea Rodriguez on 10/21/2020 4:37 PM      [address] [place] [address]    (Results Pending)            DVT prophylaxis: [x] Lovenox                                 [] SCDs                                 [] SQ Heparin                                 [] Encourage ambulation           [] Already on Anticoagulation    Code Status: Full Code      Disposition:    [x] Home       [] TCU       [] Rehab       [] Psych       [] SNF       [] [place]       [] Other-        Thank you KRYSTAL Quiroz - SONALI for the opportunity to be involved in this patient's care.     Electronically signed by Cyndie Lopez DO on 10/21/2020 at 7:56 PM

## 2020-10-21 NOTE — ED TRIAGE NOTES
Pt presents to the ED from home via [facility] c/o decreased appetite, fatigue, diarrhea, and vomiting. Pt states he was discharged from [facility] 10/13/20 after he was intubated from pneumonia and respiratory distress. Pt hx CHF, pt wears life vest, diabetes, and COPD. Upon arrival to ED, pt O2 89% on 4L n/c. Oxygen increased to 6L n/c, O2 saturation 93%. Wife at bedside at this time. Pt reports he has a headache rating pain 4/10. Pt states the decreased appetite and fatigue has been going on since he got discharged. Wife reports pt HR remains around 100-105, pt current .

## 2020-10-21 NOTE — TELEPHONE ENCOUNTER
Patient scheduled to see Priyanka Sal on 11-3-2020. Wife call's stating patient has been home for 8 days. Patient is weak, not eating, no appetite, doesn't want to get out of bed, no energy, and he is losing weight. Wife asking if any of his cardiac medications could be the cause?

## 2020-10-21 NOTE — ED NOTES
Bed: 022A  Expected date:   Expected time:   Means of arrival: WAYNE MACE II[facility] [employer]  Comments:     Elvira Lima RN  10/21/20 3313

## 2020-10-21 NOTE — TELEPHONE ENCOUNTER
Spoke with patient's wife Lawanda who stated she is unsure if sxs patient is experiencing are from cardiac medications; states patient has been taking these since inpt on 10-3-20.; new meds: Lasix, Metoprolol, Entresto. Offered appt with [facility] tomorrow 10-22-20 as pt is a new patient for CHF clinic and wife stated patient is too weak to transport to appts. Patient's wife asked if I could return her call in about 10 minutes due to receiving another call from a different doctor.

## 2020-10-21 NOTE — TELEPHONE ENCOUNTER
Spoke with wife and offered appointment this afternoon . Wife states she will try to get patient up and to the office . Instructed wife if patient is too weak to get up she needs to call the squad and have him evaluated in the ER . Wife verbalized understanding .

## 2020-10-21 NOTE — TELEPHONE ENCOUNTER
Patient's wife called stating that she is concerned for her  . Wife states that since Andrei Zurita has been home from the hospital he is weak , not eating , no appetite , doesn't want to get out of bed  , no energy  And he is losing weight . Wife is concerned that it is one of his medications causing the issues . Patient is scheduled to see you on 10/28/20. Please advise .

## 2020-10-22 NOTE — PROGRESS NOTES
Pharmacy Note  Vancomycin Consult    Jolanta Art is a 76 y.o. male started on Vancomycin for pneumonia; consult received from Dr. Pili Bell to manage therapy. Also receiving the following antibiotics: Cefepime. Patient Active Problem List   Diagnosis    Angina of effort ([facility] [address]    COPD (chronic obstructive pulmonary disease) ([facility] [address]    Type 2 diabetes mellitus (HCC)    Acute on chronic systolic heart failure (HCC)    Essential hypertension    Ileus (HCC)    Aspiration pneumonia (HCC)    Pneumonia due to infectious organism    Acute on chronic renal insufficiency    Hyperglycemia    Non-ischemic cardiomyopathy (HCC)    Acute respiratory failure with hypoxia and hypercapnia (HCC)    Acute respiratory failure due to COVID-19 [facility])       Allergies:  Patient has no known allergies. Temp max: 99.5    Recent Labs     10/19/20  1242 10/21/20  1600   BUN 11 23*       Recent Labs     10/19/20  1242 10/21/20  1600   CREATININE 1.41* 1.3*       Recent Labs     10/21/20  1600 10/21/20  2101   WBC 10.0 9.9         Intake/Output Summary (Last 24 hours) at 10/21/2020 2141  Last data filed at 10/21/2020 1947  Gross per 24 hour   Intake 150.1 ml   Output --   Net 150.1 ml       Culture Date Source Results                      Ht Readings from Last 1 Encounters:   10/21/20 5' 10\" (1.778 m)        Wt Readings from Last 1 Encounters:   10/21/20 216 lb 14.4 oz (98.4 kg)         Body mass index is 31.12 kg/m². CrCl: 58 mL/min (using IBW and SCr 1.3)      Assessment/Plan:  Will initiate vancomycin 1500 mg IV every 24 hours. Timing of trough level will be determined based on culture results, renal function, and clinical response. Thank you for the consult. Will continue to follow.

## 2020-10-22 NOTE — PLAN OF CARE
Problem: Airway Clearance - Ineffective  Goal: Achieve or maintain patent airway  10/22/2020 1652 by Yessica Mitchell  Outcome: Ongoing     Problem: Gas Exchange - Impaired  Goal: Absence of hypoxia  10/22/2020 1652 by Yessica Mitchell  Outcome: Ongoing     Problem: Gas Exchange - Impaired  Goal: Promote optimal lung function  10/22/2020 1652 by Yessica Mitchell  Outcome: Ongoing     Problem: Breathing Pattern - Ineffective  Goal: Ability to achieve and maintain a regular respiratory rate  10/22/2020 1652 by Yessica Mitchell  Outcome: Ongoing     Patient currently on HFNC at this time, ill continue to monitor.

## 2020-10-22 NOTE — CARE COORDINATION
10/22/20 1217   Readmission Assessment   Number of Days since last admission? 8-30 days   Previous Disposition Home with Home Health   Who is being Interviewed Patient   What was the patient's/caregiver's perception as to why they think they needed to return back to the hospital?   (Worsening condition.)   Did you visit your Primary Care Physician after you left the hospital, before you returned this time? No   Why weren't you able to visit your PCP? Did not have an appointment; Other (Comment)  (Pt states he attempted to call her office but no one called him back.)   Did you see a specialist, such as Cardiac, Pulmonary, Orthopedic Physician, etc. after you left the hospital? No   Who advised the patient to return to the hospital? Physician   Does the patient report anything that got in the way of taking their medications? No   In our efforts to provide the best possible care to you and others like you, can you think of anything that we could have done to help you after you left the hospital the first time, so that you might not have needed to return so soon?  Other (Comment)  (Pt feels that everything was in place at the time of his previous discharge.)

## 2020-10-22 NOTE — PROGRESS NOTES
Patient received in [phone] [id number] [phone] from ED per cart. Patient on O2 at 6 liters/nasal cannula. Walked from cart to bathroom to bed, dyspnea with exertion. Dr. Mathew Nagy in to see patient. Rails up x 2, call light within reach, bed alarm set. Reviewed orders with patient who voiced understanding.

## 2020-10-22 NOTE — CARE COORDINATION
10/22/20, 8:30 AM EDT  DISCHARGE PLANNING EVALUATION:    Evelio Figueroa       Admitted from: ER 10/21/2020/ Irismaryannshaneu 85 day: 1   Location: [address] [place] [address] Reason for admit: Acute respiratory failure due to COVID-19 ([facility] [U07.1, J96.00] Status: Inpt  Admit order signed?: yes  PMH:  has a past medical history of Acid reflux, Anxiety, Arthritis, Asthma, CHF (congestive heart failure) ([facility] Constipation, COPD (chronic obstructive pulmonary disease) ([facility] Depression, Diarrhea, Hyperlipidemia, Hypertension, Loss of vision, Migraine, Neuralgia, Pneumonia, Shortness of breath, Sleep apnea, Testosterone deficiency in male, and Type 2 diabetes mellitus ([facility] Procedure: 10-22-20 Conv Plasma  Pertinent abnormal Imaging: 10-21-20 CXR  1. Bibasilar atelectasis/infiltrate. 2. Mild stable cardiomegaly.        Medications:  Scheduled Meds:   aspirin  81 mg Oral Daily    baclofen  10 mg Oral Daily    canagliflozin  100 mg Oral QAM AC    finasteride  5 mg Oral Daily    budesonide-formoterol  2 puff Inhalation BID    furosemide  20 mg Oral Daily    hydrALAZINE  10 mg Oral BID    insulin glargine  60 Units Subcutaneous BID    isosorbide mononitrate  30 mg Oral Daily    Liraglutide  1.2 mg Subcutaneous Daily    metoprolol succinate  150 mg Oral BID    modafinil  100 mg Oral Daily    therapeutic multivitamin-minerals  1 tablet Oral Daily    pantoprazole  40 mg Oral QAM AC    pregabalin  75 mg Oral BID    sacubitril-valsartan  1 tablet Oral BID    spironolactone  12.5 mg Oral Daily    tamsulosin  0.4 mg Oral Daily    tiotropium  2 puff Inhalation Daily    sodium chloride  20 mL Intravenous Once    sodium chloride flush  10 mL Intravenous 2 times per day    dexamethasone  6 mg Intravenous Q24H    insulin lispro  0-12 Units Subcutaneous TID WC    insulin lispro  0-6 Units Subcutaneous Nightly    cefepime  2 g Intravenous Q12H    vitamin C  1,000 mg Oral Daily    Vitamin D  1,000 Units Oral Daily    zinc sulfate  50 mg Oral Daily    lactobacillus  1 capsule Oral Daily with breakfast    enoxaparin  40 mg Subcutaneous Q12H    vancomycin  1,500 mg Intravenous Q24H    vancomycin (VANCOCIN) intermittent dosing (placeholder)   Other RX Placeholder    remdesivir IVPB  100 mg Intravenous Q24H     Continuous Infusions:   dextrose        Pertinent Info/Orders/Treatment Plan: To ER with decreased appetite, fatigue, diarrhea and vomiting. Recent discharge on 10-13 after pneumonia including mech ventilation. Hx CHF, wears life vest. Uses home C-Pap. Conv plasma given. IVF at 50.hr,  Cefepime, Vanc,  Dexamethasone, PO Lasix, Remdesivir #2 today. Vits and Zinc. Febrile yesterday afternoon 102.2, this am 97.1. O2 sat 92% on HHF rate 30 and FIO2 60%. Diet: DIET CARB CONTROL;   Smoking status:  reports that he quit smoking about 28 years ago. His smoking use included cigarettes. He started smoking about 43 years ago. He has a 15.00 pack-year smoking history. He has never used smokeless tobacco.   PCP: KRYSTAL Katz CNP  Readmission 30 days or less: Yes, discharged 10-13 after pneumonia. Readmission Risk Score: 25%    Discharge Planning Evaluation  Current Residence:  Private Residence  Living Arrangements:  Alone   Support Systems:  None  Current Services PTA:     Potential Assistance Needed:  N/A  Potential Assistance Purchasing Medications:  No  Does patient want to participate in local refill/ meds to beds program?  Yes  Type of Home Care Services:  None  Patient expects to be discharged to:  home  Expected Discharge date:  10/28/20  Follow Up Appointment: Best Day/ Time: Monday AM    Patient Goals/Plan/Treatment Preferences: Met with pt this morning. From home alone. Prior to becoming ill he was self sufficient. He has current [facility] services with [facility]. Otoniel Carmona He has transportation and gets meds and basic needs without issue. SW following for discharge needs and continuity of services.    Transportation/Food Security/Housekeeping Addressed:  No issues identified.     Evaluation: yes

## 2020-10-22 NOTE — PLAN OF CARE
continue to monitor. Problem: Isolation Precautions - Risk of Spread of Infection  Goal: Prevent transmission of infection  Outcome: Ongoing  Note: Proper PPE and Hand hygiene protocols in place. Patient educated on importance of covering mouth and nose when coughing or sneezing, and keeping the door closed. Will continue to monitor. Problem: Nutrition Deficits  Goal: Optimize nutrtional status  Outcome: Ongoing  Note: Patient states he has not had much of an appetite since falling ill last week. Patient encouraged to drink fluids and eat as tolerated. Will continue to monitor. Problem: Risk for Fluid Volume Deficit  Goal: Maintain normal heart rhythm  Outcome: Ongoing  Note: Patient was sinus tachycardia most of the shift. Patient is now sinus rhythm. Will continue to monitor. Problem: Risk for Fluid Volume Deficit  Goal: Maintain absence of muscle cramping  Outcome: Ongoing  Note: Patient denies any muscle camps. Will continue to monitor. Problem: Risk for Fluid Volume Deficit  Goal: Maintain normal serum potassium, sodium, calcium, phosphorus, and pH  Outcome: Ongoing  Note: Patient has maintained labs within normal limits. Will continue to monitor. Problem: Loneliness or Risk for Loneliness  Goal: Demonstrate positive use of time alone when socialization is not possible  Outcome: Ongoing  Note: Patient wishes not to talk to anyone who may reach out to him while he is in the hospital a this time. Patient would like some time to rest ad focus on getting better. Problem: Fatigue  Goal: Verbalize increase energy and improved vitality  Outcome: Ongoing  Note: Patient as increased weakness and fatigue at this time. Will continue to monitor. Problem: Patient Education: Go to Patient Education Activity  Goal: Patient/Family Education  Outcome: Ongoing  Note: Patient educated on disease process and treatment course.       Problem: Skin Integrity:  Goal: Will show no infection signs and symptoms  Description: Will show no infection signs and symptoms  Outcome: Ongoing     Problem: Skin Integrity:  Goal: Absence of new skin breakdown  Description: Absence of new skin breakdown  Outcome: Ongoing  Note: Patient has remained of new skin breakdown throughout this shift. Patient encouraged to turn frequently to prevent from new skin breakdown. Will continue to monitor. Problem: Pain Control  Intervention: Non pharmacological pain reduction: Positioning  Note: Patient denies any pain at this time. Patient positioned for optimal comfort. Will continue to monitor. Problem: GI  Intervention: Assessment for constipation risk factors including opiods, immobility, etc.  Note: Patient was admitted with diarrhea. Will continue to monitor. Problem:   Intervention: Straight Cath, PRN  Note: Patient has voided adequately. Will continue to monitor. Problem: Discharge Planning:  Intervention: Assess knowledge level of healthcare  Note: Pt plans home at discharge. Care manager and social working helping with discharge needs. Care plan reviewed with patient. Patient verbalize understanding of the plan of care and contribute to goal setting.

## 2020-10-22 NOTE — PROGRESS NOTES
Hospitalist Progress Note    Patient:  Malou Schmidt      Unit/Bed:6A-14/014-A    YOB: 1955    MRN: [id number]       Acct: [de-identified]     PCP: KRYSTAL Chong CNP    Date of Admission: 10/21/2020    Assessment/Plan:    1. COVID-19 infection--on Decadron 6 mg daily, Lovenox 40 mg twice a day; Remdesivir 10/21; on vitamin C, vitamin D, zinc; convalescent plasma 10/21 and will give another dose today  2. Acute hypoxic respiratory failure--on HFNC at 60% 02 and 30L; wean as able; was intubated 10/3-10/8  3. Pneumonia, bibasilar (POA) likely secondary to COVID-19--see #1; Maxipime 10/21; Vanc 10/21; was intubated 10/3-10/8  4. Possible UTI (POA) with gram (-) bacilli--Maxipime 10/21; await final cx; asymptomatic  5. Mild DAI--monitor closely  6. Nausea, vomiting, diarrhea--C diff toxin (-); add Immodium PRN  7. Diabetes mellitus type 2--on Lantus along with sliding scale insulin; hemoglobin A1c was 6.7 July 2020; monitor  8. Essential hypertension, uncontrolled--Lasix, Hydralazine, BB, Aldactone; monitor  9. Chronic systolic heart failure--EF noted to be 25 to 30%; on Aldactone, Entresto, BB, Lasix, Hydralazine  10. COPD/asthma--Spiriva, Symbicort, Ventolin  11. Hyperlipidemia--not on statin  12. JOSE A--CPAP  13. GERD--PPI  14. Obesity with BMI 31.12    Expected discharge date:  TBD    Disposition:    [x] Home       [] TCU       [] Rehab       [] Psych       [] SNF       [] [place]       [] Other-    Chief Complaint: fatigue, diarrhea, loss of appetite    Hospital Course: per initial H&P from 10/21: \"65 y.o. male who presented to [facility] with several day history of worsening fatigue, diarrhea, vomiting. Patient was recently discharged on 10/13/2020 from [facility] for severe pneumonia and sepsis. Patient was initially intubated on presentation and extubated 5 days later.   Patient was given IV Zosyn x 7 days for severe pneumonia and improved rapidly. Patient was able to be discharged home on 10/13/2020 as he was no longer requiring antibiotic therapy or oxygen therapy. While at home patient states that he became very fatigued and had a very poor appetite. He states that he has been having diarrhea on and off  and productive cough since 10/13/2020 and has been having a hard time keeping food down recently. He states that he is also been having some abdominal pain and bloating with this as well. Patient states that today he became very short of breath while at home and very weak, emergency squad was called and patient was found to be hypoxic down to 89% while in transport to the ED. he was placed on 4 L of O2 via nasal cannula. In the ED patient was swabbed for COVID-19 infection which was positive. Patient continued to be hypoxic and required increased to 6 L O2 via nasal cannula. Patient also was noted to have an elevated procalcitonin to 0.36, mildly elevated AST to 77, and abnormal urinalysis with positive nitrites and leukocyte estrase and trace blood. His creatinine was mildly elevated at 1.3. Troponin negative, BNP negative. Patient denied any chest pain, swelling in his legs. He admits to shortness of breath, abdominal pain, diarrhea, vomiting, fatigue. Patient was admitted to hospitalist service for further management of his acute hypoxic respiratory failure secondary to COVID-19 infection with possible superimposed hospital-acquired pneumonia. \"    10/22-->on HFNC; still with diarrhea      Subjective (past 24 hours): c/o diarrhea; feels weak and still c/o shortness of breath     Medications:  Reviewed    Infusion Medications    sodium chloride 50 mL/hr at 10/21/20 1600    dextrose       Scheduled Medications    aspirin  81 mg Oral Daily    baclofen  10 mg Oral Daily    canagliflozin  100 mg Oral QAM AC    finasteride  5 mg Oral Daily    budesonide-formoterol  2 puff Inhalation BID    furosemide  20 mg Oral Daily    clear.  Neck: Supple, with full range of motion. No jugular venous distention. Trachea midline. Respiratory:  Normal respiratory effort. Diminished to auscultation, bilaterally without Rales/Wheezes/Rhonchi. Cardiovascular: Regular rate and rhythm with normal S1/S2 without murmurs, rubs or gallops. Abdomen: Soft, non-tender, non-distended with normal bowel sounds. Musculoskeletal: passive and active ROM x 4 extremities. Skin: Skin color, texture, turgor normal.    Neurologic:  Neurovascularly intact without any focal sensory/motor deficits. Cranial nerves: II-XII intact, grossly non-focal.  Psychiatric: Alert and oriented, thought content appropriate  Capillary Refill: Brisk,< 3 seconds   Peripheral Pulses: +2 palpable, equal bilaterally       Labs:   Recent Labs     10/19/20  1247 10/21/20  1600 10/21/20  2101   WBC 8.5 10.0 9.9   HGB 14.9 15.7 15.4   HCT 49.7 50.7 50.1   PLT See Reflexed IPF Result 194 193     Recent Labs     10/19/20  1242 10/21/20  1600 10/21/20  2101    135 136   K 3.8 4.8 5.1  5.1    95* 98   CO2 23 24 23   BUN 11 23* 22   CREATININE 1.41* 1.3* 1.2   CALCIUM 8.7 9.5 9.2     Recent Labs     10/21/20  1600 10/21/20  2101   AST 77* 78*   ALT 42 39   BILIDIR 0.3  --    BILITOT 1.1 1.1   ALKPHOS 126 127*     Recent Labs     10/21/20  2101   INR 0.99     No results for input(s): [facility] in the last 72 hours. Recent Labs     10/21/20  1600   PROCAL 0.36*       Microbiology:    Urine with gram (-) bacilli  COVID 19 (+)  C diff (-)  Legionella (P)  Strep pneumoniae (P)    Urinalysis:      Lab Results   Component Value Date    NITRU POSITIVE 10/21/2020    WBCUA 25-50 10/21/2020    BACTERIA MANY 10/21/2020    RBCUA 0-2 10/21/2020    BLOODU TRACE 10/21/2020    SPECGRAV >1.030 10/08/2020    GLUCOSEU >= 1000 10/21/2020       Radiology:  US ABDOMEN LIMITED   Final Result   Unremarkable. No ascites. **This report has been created using voice recognition software.

## 2020-10-22 NOTE — CARE COORDINATION
DISCHARGE/PLANNING EVALUATION  10/22/20, 11:38 AM EDT    Reason for Referral: \"Current with [facility]\"  Mental Status: Patient is alert and oriented  Decision Making: Patient makes own decisions  Family/Social/Home Environment: Spoke with patient, assessment completed. Patient is a readmit from 10/3-10/13. He lives alone at [employer], he and wife are . Apartment is handicapped accessible. He is current with [facility], nursing, PT & OT. In process of signing up for [address], C/Marcos Boss [phone]. Current Services including food security, transportation and housekeeping: Patient states needs are met, current with [facility]  Current Equipment:none  Payment Source: [facility] Medicare and Medicaid  Concerns or Barriers to Discharge: Patient wants to continue with [facility] at discharge and passport. C/Marcos Boss asked if patient could begin Medicaid application process so it will keep the passport application going. Post acute provider list with quality measures, geographic area and applicable managed care information provided. Questions regarding selection process answered: no, current with [facility]    Teach Back Method used with pateint regarding care plan and discharge planning. Patient  verbalize understanding of the plan of care and contribute to goal setting. Patient goals, treatment preferences and discharge plan: Patient plans home alone and resume [facility], nursing, PT & OT. Spoke with Vielka Hobson at [facility], confirmed services.   Spoke with Julia Rodney at [employer], requests Medicaid application to be started (for [employer])    Electronically signed by ANGELA Cain on 10/22/2020 at 11:38 AM

## 2020-10-23 NOTE — PLAN OF CARE
sodium, calcium, phosphorus, and pH  Outcome: Ongoing  Note: AM labwork. Problem: Loneliness or Risk for Loneliness  Goal: Demonstrate positive use of time alone when socialization is not possible  Outcome: Ongoing  Note: Hourly rounding with nursing staff. Able to express needs appropriately. Problem: Fatigue  Goal: Verbalize increase energy and improved vitality  Outcome: Ongoing  Note: Reports fatigue this shift, attempting to rest.     Problem: Patient Education: Go to Patient Education Activity  Goal: Patient/Family Education  Outcome: Ongoing  Note: Patient educated on medications purpose and side effect this shift. All questions answered. Will update family in AM with condition from overnight. No further questions from patient at this time. Problem: Skin Integrity:  Goal: Will show no infection signs and symptoms  Description: Will show no infection signs and symptoms  Outcome: Ongoing  Note: Patient with fever this shift. On IV ATB's. AM labwork. Will continue to monitor. Goal: Absence of new skin breakdown  Description: Absence of new skin breakdown  Outcome: Ongoing  Note: Patient able to turn self in bed. Encouraged to reposition during purposefully hourly rounding. Educated patient on importance of weight distribution to prevent skin breakdown. No additional skin breakdown noted this shift. See skin assessment. Problem: DISCHARGE BARRIERS  Goal: Patient's continuum of care needs are met  Outcome: Ongoing  Note: Patient from home with family. Plan to return home with [facility] at d/c. No discharge orders at this time. Problem: Falls - Risk of:  Goal: Will remain free from falls  Description: Will remain free from falls  Outcome: Ongoing  Note:   Patient free from falls this shift. Patient continues on falling star program. Encouraged patient to wear non-skid slippers when ambulating. Patient is 1-2 assist. Side rails up x2. Continuing hourly checks, 5 p's monitored.  Uses call light appropriately. Call light within reach. Goal: Absence of physical injury  Description: Absence of physical injury  Outcome: Ongoing  Note: Free from physical injury this shift. Care plan reviewed with patient. Patient verbalizes understanding of the plan of care and contribute to goal setting.

## 2020-10-23 NOTE — PLAN OF CARE
Problem: Airway Clearance - Ineffective  Goal: Achieve or maintain patent airway  Outcome: Ongoing  Note: Patient continues on high flow oxygen. Weaning as able. Problem: Gas Exchange - Impaired  Goal: Absence of hypoxia  Outcome: Ongoing  Note: Pox remains above 92%. Continuous pox remains. Problem: Body Temperature -  Risk of, Imbalanced  Goal: Ability to maintain a body temperature within defined limits  Outcome: Ongoing  Note: Patient afebrile. Problem: Isolation Precautions - Risk of Spread of Infection  Goal: Prevent transmission of infection  Outcome: Ongoing  Note: Droplet plus precautions continue. Problem: Nutrition Deficits  Goal: Optimize nutrtional status  Outcome: Ongoing  Note: Poor nutritional intake noted. Problem: Fatigue  Goal: Verbalize increase energy and improved vitality  Outcome: Ongoing  Note: Patient continues to complain of fatigue. Problem: Falls - Risk of:  Goal: Will remain free from falls  Description: Will remain free from falls  Outcome: Ongoing  Note: Fall risk wrist band and fall sign in place. Patient demonstrates proper use at this time. Problem: Impaired respiratory status  Goal: Clear lung sounds  Description: Clear lung sounds  10/23/2020 0743 by Helene Martinez RCP  Outcome: Ongoing  Note: maintenance     Problem: Nutrition  Goal: Optimal nutrition therapy  10/23/2020 1403 by Connie Tello RD, LD  Outcome: Ongoing     Care plan reviewed with patient. Patient verbalizes understanding of the plan of care and contributes to goal setting.

## 2020-10-23 NOTE — CARE COORDINATION
10/23/20, 12:47 PM EDT    DISCHARGE ON [address] day: 2  Location: [facility]/014-A Reason for admit: Acute [facility] [U07.1, J96.00]   Procedure: No  Treatment Plan of Care: Febrile overnight TMax 101.5. Currently 98.1. On HHF 60L/70%. O2 sat 91%. Cefipime Vanc. Dexamethasone. Lovenox. Lasix. Remdesivir #3. Vits and Zinc.  Barriers to Discharge: Medical stability. PCP: KRYSTAL Hogan CNP  Readmission Risk Score: 30%  Patient Goals/Plan/Treatment Preferences: From home alone. Has current [facility]. Will follow for increased needs.

## 2020-10-23 NOTE — PROGRESS NOTES
Comprehensive Nutrition Assessment    Type and Reason for Visit:  Initial, Positive Nutrition Screen, Consult(poor oral intake, appetite)    Nutrition Recommendations/Plan: Recommend diet as tolerated. Will send Glucerna TID. Recommend Probiotic. Continue MVI. Nutrition Assessment:    Pt. nutritionally compromised AEB poor po intake over the past several weeks, weight loss d/t COVID+. At risk for further nutrition compromise r/t increased nutrient needs for wound healing, recent pneumonia, sepsis and underlying medical condition (hx CHF, COPD, DM). Nutrition recommendations/interventions as per above. Malnutrition Assessment:  Malnutrition Status:  Insufficient data    Context:  Acute Illness     Findings of the 6 clinical characteristics of malnutrition:  Energy Intake:  7 - 50% or less of estimated energy requirements for 5 or more days  Weight Loss:  (-7.2% in less than 1 month (but ? partially d/t edema))     Body Fat Loss:  Unable to assess     Muscle Mass Loss:  Unable to assess    Fluid Accumulation:  Unable to assess     Strength:  Not Performed    Estimated Daily Nutrient Needs:  Energy (kcal):  9487-8570 kcals (18-20); Weight Used for Energy Requirements:  (98 kgm 10/21)     Protein (g):  90+ grams (1.2+); Weight Used for Protein Requirements:  Ideal(75 kgm)        Nutrition Related Findings:  COVID +, spoke with pt. on phone; reports very poor appetite and intake since discharged 10/13; states hasn't been able to eat much of anything; SOB; 10/23: Potassium 5.5, Glucose 266, BUN 41, Cr 1.7, Potassium 5.5; Rx includes Imodium, ATB, Vitamin C, D, MVI, Latnus, Bentyl      Wounds:  (toe - wound (type not noted))       Current Nutrition Therapies:    DIET CARB CONTROL;   Dietary Nutrition Supplements: Diabetic Oral Supplement    Anthropometric Measures:  · Height: 5' 10\" (177.8 cm)  · Current Body Weight: 216 lb 14.4 oz (98.4 kg)(10/21, no edema)   · Admission Body Weight: 216 lb 14.4 oz (98.4 kg)(10/21, no edema)    · Usual Body Weight: (per pt ~233# 10/3; per EMR 7/1/20: 229#, 10/3: 233# 11 oz;)     · Ideal Body Weight: 166 lbs; % Ideal Body Weight     · BMI: 31.1  · BMI Categories: Obese Class 1 (BMI 30.0-34. 9)       Nutrition Diagnosis:   · Inadequate oral intake related to catabolic illness, inadequate protein-energy intake as evidenced by poor intake prior to admission, weight loss      Nutrition Interventions:   Food and/or Nutrient Delivery:  Continue Current Diet, Start Oral Nutrition Supplement  Nutrition Education/Counseling:  Education initiated(10/23 Encouraged po, ONS intake at best efforts.)   Coordination of Nutrition Care:  Continued Inpatient Monitoring    Goals:  Pt. will tolerate and consume 75% or more of meals during LOS       Nutrition Monitoring and Evaluation:   Behavioral-Environmental Outcomes:  Knowledge or Skill   Food/Nutrient Intake Outcomes:  Diet Advancement/Tolerance, Food and Nutrient Intake, Supplement Intake  Physical Signs/Symptoms Outcomes:  Biochemical Data, GI Status, Fluid Status or Edema, Nutrition Focused Physical Findings, Skin, Weight     Discharge Planning:     Too soon to determine     Electronically signed by Kaylen Moy RD, LD on 10/23/20 at 2:04 PM EDT    Contact: [phone]

## 2020-10-23 NOTE — PLAN OF CARE
Problem: Nutrition  Goal: Optimal nutrition therapy  Outcome: Ongoing  Nutrition Problem #1: Inadequate oral intake  Intervention: Food and/or Nutrient Delivery: Continue Current Diet, Start Oral Nutrition Supplement  Nutritional Goals: Pt. will tolerate and consume 75% or more of meals during LOS

## 2020-10-23 NOTE — PROGRESS NOTES
Hospitalist Progress Note    Patient:  Brandy Chappell      Unit/Bed:6A-14/014-A    YOB: 1955    MRN: [id number]       Acct: [de-identified]     PCP: KRYSTAL Lee CNP    Date of Admission: 10/21/2020    Assessment/Plan:    1. COVID-19 infection--on Decadron 6 mg daily, Lovenox 40 mg twice a day; Remdesivir 10/21; on vitamin C, vitamin D, zinc; convalescent plasma 10/21, 10/22; remains on high flow  2. Acute hypoxic respiratory failure--on HFNC at 70% 02 and 60L; wean as able; was intubated 10/3-10/8  3. Pneumonia, bibasilar (POA) likely secondary to COVID-19--see #1; Maxipime 10/21; Vanc 10/21; was intubated 10/3-10/8  4. Possible UTI (POA) with E. coli--Maxipime (S) 10/21; asymptomatic  5. DAI on CKD stage III--monitor closely as creatinine went up to 1.7 from 1.5  6. Hyperkalemia--we will recheck now as he is on Entresto along with Aldactone  7. Nausea, vomiting, diarrhea--C diff toxin (-); Immodium PRN  8. Diabetes mellitus type 2--on Lantus along with sliding scale insulin; hemoglobin A1c was 6.7 July 2020; monitor  9. Essential hypertension, uncontrolled--Lasix, Hydralazine, BB, Aldactone; monitor  10. Chronic systolic heart failure--EF noted to be 25 to 30%; on Aldactone, Entresto, BB, Lasix, Hydralazine; LifeVest in place  11. COPD/asthma--Spiriva, Symbicort, Ventolin; requiring high flow oxygen at this time  12. Hyperlipidemia--not on statin  13. JOSE A--CPAP  14. GERD--PPI  15. Obesity with BMI 31.12    Expected discharge date:  TBD    Disposition:    [x] Home       [] TCU       [] Rehab       [] Psych       [] SNF       [] [facility]       [] Other-    Chief Complaint: fatigue, diarrhea, loss of appetite    Hospital Course: per initial H&P from 10/21: \"65 y.o. male who presented to [address] [place] [address] with several day history of worsening fatigue, diarrhea, vomiting.   Patient was recently discharged on 10/13/2020 from [facility] for severe pneumonia Reviewed    Infusion Medications    dextrose       Scheduled Medications    aspirin  81 mg Oral Daily    baclofen  10 mg Oral Daily    canagliflozin  100 mg Oral QAM AC    finasteride  5 mg Oral Daily    budesonide-formoterol  2 puff Inhalation BID    furosemide  20 mg Oral Daily    hydrALAZINE  10 mg Oral BID    insulin glargine  60 Units Subcutaneous BID    isosorbide mononitrate  30 mg Oral Daily    Liraglutide  1.2 mg Subcutaneous Daily    metoprolol succinate  150 mg Oral BID    modafinil  100 mg Oral Daily    therapeutic multivitamin-minerals  1 tablet Oral Daily    pantoprazole  40 mg Oral QAM AC    pregabalin  75 mg Oral BID    sacubitril-valsartan  1 tablet Oral BID    spironolactone  12.5 mg Oral Daily    tamsulosin  0.4 mg Oral Daily    tiotropium  2 puff Inhalation Daily    sodium chloride  20 mL Intravenous Once    sodium chloride flush  10 mL Intravenous 2 times per day    dexamethasone  6 mg Intravenous Q24H    insulin lispro  0-12 Units Subcutaneous TID WC    insulin lispro  0-6 Units Subcutaneous Nightly    cefepime  2 g Intravenous Q12H    vitamin C  1,000 mg Oral Daily    Vitamin D  1,000 Units Oral Daily    zinc sulfate  50 mg Oral Daily    lactobacillus  1 capsule Oral Daily with breakfast    enoxaparin  40 mg Subcutaneous Q12H    vancomycin  1,500 mg Intravenous Q24H    vancomycin (VANCOCIN) intermittent dosing (placeholder)   Other RX Placeholder    remdesivir IVPB  100 mg Intravenous Q24H     PRN Meds: loperamide, albuterol, dicyclomine, polyethylene glycol, acetaminophen **OR** acetaminophen, sodium chloride flush, promethazine **OR** ondansetron, glucose, dextrose, glucagon (rDNA), dextrose, sodium chloride      Intake/Output Summary (Last 24 hours) at 10/23/2020 1008  Last data filed at 10/23/2020 0807  Gross per 24 hour   Intake 1858.11 ml   Output 600 ml   Net 1258.11 ml       Diet:  DIET CARB CONTROL;     Exam:  BP (!) 152/77   Pulse 87   Temp 99 °F (37.2 °C) (Oral)   Resp 22   Ht 5' 10\" (1.778 m)   Wt 216 lb 14.4 oz (98.4 kg)   SpO2 94%   BMI 31.12 kg/m²     General appearance: No apparent distress, appears older than stated age and cooperative. Chronically ill appearing. HEENT: Pupils equal, round, and reactive to light. Conjunctivae/corneas clear. Neck: Supple, with full range of motion. No jugular venous distention. Trachea midline. Respiratory:  Normal respiratory effort. Diminished to auscultation, bilaterally without Rales/Wheezes/Rhonchi. Cardiovascular: Regular rate and rhythm with normal S1/S2 without murmurs, rubs or gallops. LifeVest in place  Abdomen: Soft, non-tender, non-distended with normal bowel sounds. Musculoskeletal: passive and active ROM x 4 extremities. Skin: Skin color, texture, turgor normal.    Neurologic:  Neurovascularly intact without any focal sensory/motor deficits. Cranial nerves: II-XII intact, grossly non-focal.  Psychiatric: Alert and oriented, thought content appropriate  Capillary Refill: Brisk,< 3 seconds   Peripheral Pulses: +2 palpable, equal bilaterally       Labs:   Recent Labs     10/21/20  2101 10/22/20  0915 10/23/20  0749   WBC 9.9 9.8 8.9   HGB 15.4 15.9 15.4   HCT 50.1 51.8 50.0    209 198     Recent Labs     10/21/20  2101 10/22/20  0915 10/23/20  0749    134* 136   K 5.1  5.1 5.1 5.5*   CL 98 99 99   CO2 23 19* 22*   BUN 22 29* 41*   CREATININE 1.2 1.5* 1.7*   CALCIUM 9.2 9.1 8.9     Recent Labs     10/21/20  1600 10/21/20  2101 10/22/20  0915 10/23/20  0749   AST 77* 78* 76* 85*   ALT 42 39 40 37   BILIDIR 0.3  --  0.3 0.3   BILITOT 1.1 1.1 1.2 1.2   ALKPHOS 126 127* 116 111     Recent Labs     10/21/20  2101 10/22/20  0915 10/23/20  0749   INR 0.99 0.95 1.00     No results for input(s): Rogelio Ken in the last 72 hours.   Recent Labs     10/21/20  1600   PROCAL 0.36*       Microbiology:    Urine with E. coli  COVID 19 (+)  C diff (-)  Legionella (-)  Strep pneumoniae

## 2020-10-23 NOTE — PROGRESS NOTES
[address] [place] [address]  INPATIENT PHYSICAL THERAPY  EVALUATION  STR[facility] 6A [facility] - 6A-14/014-A    Time In: 5479  Time Out: 1205  Timed Code Treatment Minutes: 8 Minutes  Minutes: 17          Date: 10/23/2020  Patient Name: Marcus Blanco,  Gender:  male        MRN: [id number]  : 1955  (76 y.o.)      Referring Practitioner: Abdiel JC  Diagnosis: Acute Respiratory failure due to COVID -19  Additional Pertinent Hx: From admission note [birth year]-65 y.o. male who presented to [address] [place] [address] with several day history of worsening fatigue, diarrhea, vomiting. Patient was recently discharged on 10/13/2020 from [facility] for severe pneumonia and sepsis. Patient was initially intubated on presentation and extubated 5 days later. Patient was given IV Zosyn x 7 days for severe pneumonia and improved rapidly. Patient was able to be discharged home on 10/13/2020 as he was no longer requiring antibiotic therapy or oxygen therapy. While at home patient states that he became very fatigued and had a very poor appetite. He states that he has been having diarrhea on and off  and productive cough since 10/13/2020 and has been having a hard time keeping food down recently. He states that he is also been having some abdominal pain and bloating with this as well. Patient states that today he became very short of breath while at home and very weak, emergency squad was called and patient was found to be hypoxic down to 89% while in transport to the ED. he was placed on 4 L of O2 via nasal cannula. In the ED patient was swabbed for COVID-19 infection which was positive. Patient continued to be hypoxic and required increased to 6 L O2 via nasal cannula. Patient also was noted to have an elevated procalcitonin to 0.36, mildly elevated AST to 77, and abnormal urinalysis with positive nitrites and leukocyte estrase and trace blood. His creatinine was mildly elevated at 1.3.   Troponin negative, BNP negative. Patient denied any chest pain, swelling in his legs. He admits to shortness of breath, abdominal pain, diarrhea, vomiting, fatigue. Patient was admitted to hospitalist service for further management of his acute hypoxic respiratory failure secondary to COVID-19 infection with possible superimposed hospital-acquired pneumonia. Restrictions/Precautions:  Restrictions/Precautions: Fall Risk, Isolation, General Precautions  Position Activity Restriction  Other position/activity restrictions: COVID +, high flow , O2 sats 90-92  but tachy 144 on Oct 23. Subjective:  Chart Reviewed: Yes  Patient assessed for rehabilitation services?: Yes  Family / Caregiver Present: No  Subjective: Pt in bed  on high flow. The straps were adjusted by myself as per pt request to make tighter. O2 sats stayed 90-92, however pt was tachy throughout from 108 to 144 with bed ex. General:  Overall Orientation Status: Within Normal Limits  Follows Commands: Within Functional Limits    Vision: Within Functional Limits    Hearing: Within functional limits         Pain: 0/10:     Social/Functional History:    Lives With: Alone  Type of Home: Apartment  Home Layout: One level  Home Access: Elevator  Home Equipment: 4 wheeled walker             ADL Assistance: Independent     Ambulation Assistance: Independent  Transfer Assistance: Independent          Additional Comments: Pt uses rollator  at home and had [facility] therapy    OBJECTIVE:  Range of Motion:  Bilateral Lower Extremity: WFL    Strength:  Bilateral Lower Extremity: Impaired - generalized weakness 4/5    Balance:  NT    Bed Mobility:  Not Tested    Transfers:  Not Tested    Mobility NT due to tachy at rest and increased with exercises   Exercise:  Patient was guided in 1 set(s) 10 reps of exercise to both lower extremities. Ankle pumps, Glut sets, Quad sets, Heelslides and Hip abduction/adduction.   Exercises were completed for increased independence with

## 2020-10-23 NOTE — PLAN OF CARE
Problem: Impaired respiratory status  Goal: Clear lung sounds  Description: Clear lung sounds  Outcome: Ongoing  Note: maintenance

## 2020-10-24 NOTE — PROGRESS NOTES
Updated pt's wife Jenaro Davidson.     Electronically signed by Arsen Durand RN on 10/24/2020 at 6:10 AM

## 2020-10-24 NOTE — PROGRESS NOTES
Hospitalist Progress Note    Patient:  Lizette Luis      Unit/Bed:6A-14/014-A    YOB: 1955    MRN: [id number]       Acct: [de-identified]     PCP: KRYSTAL Ann CNP    Date of Admission: 10/21/2020    Assessment/Plan:    1. COVID-19 infection--on Decadron 6 mg daily, Lovenox 40 mg twice a day; Remdesivir 10/21; on vitamin C, vitamin D, zinc; convalescent plasma 10/21, 10/22; remains on high flow at 80% FiO2 and 60 L  2. Acute hypoxic respiratory failure--on HFNC at 80% 02 and 60L; wean as able; was intubated 10/3-10/8; he is talking in complete sentences relates to increase in shortness of breath with exertion when he goes to the restroom  3. Pneumonia, bibasilar (POA) likely secondary to COVID-19--see #1; Maxipime 10/21; Vanc 10/21-10/24; was intubated 10/3-10/8  4. Possible UTI (POA) with E. coli--Maxipime (S) 10/21; asymptomatic  5. DAI on CKD stage III--monitor closely as creatinine went up to 1.7 from 1.5; looks like baseline is around 1.4 area; had to be very cautious with fluids secondary to his systolic heart failure  6. Hyperkalemia--resolved   7. Nausea, vomiting, diarrhea--C diff toxin (-); Immodium PRN; resolved  8. Diabetes mellitus type 2--on Lantus along with sliding scale insulin; hemoglobin A1c was 6.7 July 2020; monitor  9. Essential hypertension, uncontrolled--Lasix, Hydralazine, BB, Aldactone; monitor  10. Chronic systolic heart failure--EF noted to be 25 to 30%; on Aldactone, Entresto, BB, Lasix, Hydralazine; LifeVest in place  11. COPD/asthma--Spiriva, Symbicort, Ventolin; requiring high flow oxygen at this time  12. Hyperlipidemia--not on statin  13. JOSE A--CPAP  14. GERD--PPI  15.  Obesity with BMI 31.12    Expected discharge date:  TBD    Disposition:    [x] Home       [] TCU       [] Rehab       [] Psych       [] SNF       [] [facility]       [] Other-    Chief Complaint: fatigue, diarrhea, loss of appetite    Hospital Course: per initial H&P from 10/21: \"78 y.o. male who presented to [address] [place] [address] with several day history of worsening fatigue, diarrhea, vomiting. Patient was recently discharged on 10/13/2020 from [facility] for severe pneumonia and sepsis. Patient was initially intubated on presentation and extubated 5 days later. Patient was given IV Zosyn x 7 days for severe pneumonia and improved rapidly. Patient was able to be discharged home on 10/13/2020 as he was no longer requiring antibiotic therapy or oxygen therapy. While at home patient states that he became very fatigued and had a very poor appetite. He states that he has been having diarrhea on and off  and productive cough since 10/13/2020 and has been having a hard time keeping food down recently. He states that he is also been having some abdominal pain and bloating with this as well. Patient states that today he became very short of breath while at home and very weak, emergency squad was called and patient was found to be hypoxic down to 89% while in transport to the ED. he was placed on 4 L of O2 via nasal cannula. In the ED patient was swabbed for COVID-19 infection which was positive. Patient continued to be hypoxic and required increased to 6 L O2 via nasal cannula. Patient also was noted to have an elevated procalcitonin to 0.36, mildly elevated AST to 77, and abnormal urinalysis with positive nitrites and leukocyte estrase and trace blood. His creatinine was mildly elevated at 1.3. Troponin negative, BNP negative. Patient denied any chest pain, swelling in his legs. He admits to shortness of breath, abdominal pain, diarrhea, vomiting, fatigue. Patient was admitted to hospitalist service for further management of his acute hypoxic respiratory failure secondary to COVID-19 infection with possible superimposed hospital-acquired pneumonia. \"    10/22-->on HFNC; still with diarrhea      10/23--> patient is much more alert today, no complaints of diarrhea, states his breathing is improving; he remains on high flow    10/24--> remains on high flow oxygen at 80% FiO2 and 60 L and currently satting 94%; states overall he is feeling better however \"gets quite winded \"when he goes to the restroom; eating better; PT/OT are on his case    Subjective (past 24 hours): States overall he is feeling better, he is eating better and states that his shortness of breath has improved however still quite short of breath with exertion    Medications:  Reviewed    Infusion Medications    dextrose       Scheduled Medications    HYDROcodone-chlorpheniramine  5 mL Oral Nightly    aspirin  81 mg Oral Daily    baclofen  10 mg Oral Daily    canagliflozin  100 mg Oral QAM AC    finasteride  5 mg Oral Daily    budesonide-formoterol  2 puff Inhalation BID    furosemide  20 mg Oral Daily    hydrALAZINE  10 mg Oral BID    insulin glargine  60 Units Subcutaneous BID    isosorbide mononitrate  30 mg Oral Daily    Liraglutide  1.2 mg Subcutaneous Daily    metoprolol succinate  150 mg Oral BID    modafinil  100 mg Oral Daily    therapeutic multivitamin-minerals  1 tablet Oral Daily    pantoprazole  40 mg Oral QAM AC    pregabalin  75 mg Oral BID    sacubitril-valsartan  1 tablet Oral BID    spironolactone  12.5 mg Oral Daily    tamsulosin  0.4 mg Oral Daily    tiotropium  2 puff Inhalation Daily    sodium chloride  20 mL Intravenous Once    sodium chloride flush  10 mL Intravenous 2 times per day    dexamethasone  6 mg Intravenous Q24H    insulin lispro  0-12 Units Subcutaneous TID WC    insulin lispro  0-6 Units Subcutaneous Nightly    cefepime  2 g Intravenous Q12H    vitamin C  1,000 mg Oral Daily    Vitamin D  1,000 Units Oral Daily    zinc sulfate  50 mg Oral Daily    lactobacillus  1 capsule Oral Daily with breakfast    enoxaparin  40 mg Subcutaneous Q12H    [Held by provider] vancomycin  1,500 mg Intravenous Q24H    vancomycin (VANCOCIN) intermittent dosing (placeholder)   Other RX Placeholder    remdesivir IVPB  100 mg Intravenous Q24H     PRN Meds: loperamide, albuterol, dicyclomine, polyethylene glycol, acetaminophen **OR** acetaminophen, sodium chloride flush, promethazine **OR** ondansetron, glucose, dextrose, glucagon (rDNA), dextrose, sodium chloride      Intake/Output Summary (Last 24 hours) at 10/24/2020 0922  Last data filed at 10/24/2020 6926  Gross per 24 hour   Intake 613.33 ml   Output 850 ml   Net -236.67 ml       Diet:  DIET CARB CONTROL; Dietary Nutrition Supplements: Diabetic Oral Supplement    Exam:  /85   Pulse 88   Temp 97.4 °F (36.3 °C) (Oral)   Resp 18   Ht 5' 10\" (1.778 m)   Wt 216 lb 14.4 oz (98.4 kg)   SpO2 94%   BMI 31.12 kg/m²     General appearance: No apparent distress, appears older than stated age and cooperative. Chronically ill appearing. HEENT: Pupils equal, round, and reactive to light. Conjunctivae/corneas clear. Neck: Supple, with full range of motion. No jugular venous distention. Trachea midline. Respiratory:  Normal respiratory effort. Diminished to auscultation, bilaterally without Rales/Wheezes/Rhonchi. Able to speak in complete sentences  Cardiovascular: Regular rate and rhythm with normal S1/S2 without murmurs, rubs or gallops. LifeVest in place  Abdomen: Soft, non-tender, non-distended with normal bowel sounds. Musculoskeletal: passive and active ROM x 4 extremities. Skin: Skin color, texture, turgor normal.    Neurologic:  Neurovascularly intact without any focal sensory/motor deficits.  Cranial nerves: II-XII intact, grossly non-focal.  Psychiatric: Alert and oriented, thought content appropriate  Capillary Refill: Brisk,< 3 seconds   Peripheral Pulses: +2 palpable, equal bilaterally       Labs:   Recent Labs     10/22/20  0915 10/23/20  0749 10/24/20  0304   WBC 9.8 8.9 9.7   HGB 15.9 15.4 15.2   HCT 51.8 50.0 48.9    198 180     Recent Labs     10/22/20  0915 10/23/20  0705 10/23/20  1813 10/24/20  0304   * 136  --  133*   K 5.1 5.5* 5.3* 4.7   CL 99 99  --  98   CO2 19* 22*  --  21*   BUN 29* 41*  --  44*   CREATININE 1.5* 1.7*  --  1.7*   CALCIUM 9.1 8.9  --  8.6     Recent Labs     10/22/20  0915 10/23/20  0749 10/24/20  0304   AST 76* 85* 77*   ALT 40 37 35   BILIDIR 0.3 0.3 0.3   BILITOT 1.2 1.2 1.1   ALKPHOS 116 111 115     Recent Labs     10/22/20  0915 10/23/20  0749 10/24/20  0304   INR 0.95 1.00 1.05     No results for input(s): Nneka Singh in the last 72 hours. Recent Labs     10/21/20  1600   PROCAL 0.36*       Microbiology:    Urine with E. coli  COVID 19 (+)  C diff (-)  Legionella (-)  Strep pneumoniae (-)    Urinalysis:      Lab Results   Component Value Date    NITRU POSITIVE 10/21/2020    WBCUA 25-50 10/21/2020    BACTERIA MANY 10/21/2020    RBCUA 0-2 10/21/2020    BLOODU TRACE 10/21/2020    SPECGRAV >1.030 10/08/2020    GLUCOSEU >= 1000 10/21/2020       Radiology:  US ABDOMEN LIMITED   Final Result   Unremarkable. No ascites. **This report has been created using voice recognition software. It may contain minor errors which are inherent in voice recognition technology. **      Final report electronically signed by Dr. Ganesh Webster on 10/22/2020 7:01 AM      XR CHEST PORTABLE   Final Result   1. Bibasilar atelectasis/infiltrate. 2. Mild stable cardiomegaly. **This report has been created using voice recognition software. It may contain minor errors which are inherent in voice recognition technology. **      Final report electronically signed by Dr. Connie Carmona on 10/21/2020 4:37 PM          DVT prophylaxis: [x] Lovenox 40 mg BID                                 [] SCDs                                 [] SQ Heparin                                 [] Encourage ambulation           [] Already on Anticoagulation     Code Status: Full Code    Tele:   [x] yes SR HR 87             [] no    Active Hospital Problems    Diagnosis Date Noted    Pneumonia due to COVID-19 virus [U07.1, J12.89]     Nausea vomiting and diarrhea [R11.2, R19.7]     Sinus tachycardia [R00.0]     Elevated AST (SGOT) [R74.01]     History of pneumonia [Z87.01]     History of COPD [Z87.09]     History of asthma [Z87.09]     Hx of coronary artery disease [Z86.79]     Hyperlipidemia [E78.5]     Hx of gastroesophageal reflux (GERD) [Z87.19]     Acute respiratory failure due to COVID-19 (HCC) [U07.1, J96.00] 52/82/0201    Systolic CHF, acute on chronic [facility]) [I50.23]        Electronically signed by KRYSTAL Trinidad CNP on 10/24/2020 at 9:53 AM

## 2020-10-24 NOTE — PROGRESS NOTES
Pharmacy Vancomycin Consult     Vancomycin Day: 3  Current Dosing: vancomycin 1500 mg iv every 24 hours    Temp max:  99    Recent Labs     10/22/20  0915 10/23/20  0749   BUN 29* 41*       Recent Labs     10/22/20  0915 10/23/20  0749   CREATININE 1.5* 1.7*       Recent Labs     10/22/20  0915 10/23/20  0749   WBC 9.8 8.9       Ht Readings from Last 1 Encounters:   10/21/20 5' 10\" (1.778 m)        Wt Readings from Last 1 Encounters:   10/21/20 216 lb 14.4 oz (98.4 kg)         Body mass index is 31.12 kg/m². Estimated Creatinine Clearance: 50 mL/min (A) (based on SCr of 1.7 mg/dL (H)). Trough: 36.9    Assessment/Plan:  Asked lab to credit the vanc trough as it was drawn an hour into the infusion. Will need to recheck a trough tomorrow night. Will place the vancomycin dose on hold so that it is not given before the trough again.     Prerna ROSA AmstutzPharmD  10/24/2020   1:52 AM

## 2020-10-24 NOTE — PLAN OF CARE
Care plan reviewed with patient and RN. Patient and RN verbalize understanding of the plan of care and contribute to goal setting.

## 2020-10-24 NOTE — PLAN OF CARE
Problem: Airway Clearance - Ineffective  Goal: Achieve or maintain patent airway  10/23/2020 2220 by Aure Whittaker RN  Outcome: Ongoing  Note: Patient is able to maintain a patent airway. Pt is on High Flow. Problem: Gas Exchange - Impaired  Goal: Absence of hypoxia  10/23/2020 2220 by Aure Whittaker RN  Outcome: Ongoing  Note: Pt's 02 fluctuates between 88 to 91 percent. Problem: Gas Exchange - Impaired  Goal: Promote optimal lung function  Outcome: Ongoing  Note: Instructed patient to cough and deep breathe. Problem: Breathing Pattern - Ineffective  Goal: Ability to achieve and maintain a regular respiratory rate  Outcome: Ongoing  Note: Pt is tachypneic at times. Problem: Body Temperature -  Risk of, Imbalanced  Goal: Ability to maintain a body temperature within defined limits  10/23/2020 2220 by Aure Whittaker RN  Outcome: Ongoing  Note: Body temperature is within normal limits. Problem: Body Temperature -  Risk of, Imbalanced  Goal: Will regain or maintain usual level of consciousness  Outcome: Ongoing  Note: Pt is alert and oriented x4. Problem: Isolation Precautions - Risk of Spread of Infection  Goal: Prevent transmission of infection  10/23/2020 2220 by Aure Whittaker RN  Outcome: Ongoing  Note: Patient isolation for Covid. Droplet plus precautions in place. Proper PPE being administered. Problem: Nutrition Deficits  Goal: Optimize nutrtional status  10/23/2020 2220 by Aure Whittaker RN  Outcome: Ongoing     Problem: Risk for Fluid Volume Deficit  Goal: Maintain normal heart rhythm  Outcome: Ongoing  Note: Pt runs tachycardic on bedside tele monitor.       Problem: Loneliness or Risk for Loneliness  Goal: Demonstrate positive use of time alone when socialization is not possible  Outcome: Ongoing     Problem: Fatigue  Goal: Verbalize increase energy and improved vitality  10/23/2020 2220 by Aure Whittaker RN  Outcome: Ongoing  Note: Pt denies fatigue. Problem: Patient Education: Go to Patient Education Activity  Goal: Patient/Family Education  Outcome: Ongoing  Note: Educated pt on PM  meds. Problem: Skin Integrity:  Goal: Will show no infection signs and symptoms  Description: Will show no infection signs and symptoms  Outcome: Ongoing  Note: Pt shows no signs and symptoms. Problem: Skin Integrity:  Goal: Absence of new skin breakdown  Description: Absence of new skin breakdown  Outcome: Ongoing  Note: No new skin break down this shift. Problem: DISCHARGE BARRIERS  Goal: Patient's continuum of care needs are met  Outcome: Ongoing  Note: Plan is to return home with wife. Problem: Falls - Risk of:  Goal: Will remain free from falls  Description: Will remain free from falls  10/23/2020 2220 by Divya Mcmillan RN  Outcome: Ongoing  Note: Patient absent of falls this shift, fall band intact, bed alarm set, falling star magnet in place. Problem: Falls - Risk of:  Goal: Absence of physical injury  Description: Absence of physical injury  Outcome: Ongoing  Note: No physical injury this shift.        Electronically signed by Divya Mcmillan RN on 10/23/2020 at 10:27 PM

## 2020-10-25 NOTE — PLAN OF CARE
Problem: Airway Clearance - Ineffective  Goal: Achieve or maintain patent airway  10/25/2020 0252 by Shelby Murry RN  Outcome: Ongoing  Note: Airway remains clear. Problem: Body Temperature -  Risk of, Imbalanced  Goal: Ability to maintain a body temperature within defined limits  10/25/2020 0252 by Shelby Murry RN  Outcome: Ongoing  Note: Patient afebrile this shift. Problem: Isolation Precautions - Risk of Spread of Infection  Goal: Prevent transmission of infection  10/25/2020 0252 by Shelby Murry RN  Outcome: Ongoing  Note: Patient in droplet plus precautions. Problem: Nutrition Deficits  Goal: Optimize nutrtional status  10/25/2020 0252 by Shelby Murry RN  Outcome: Ongoing  Note: Patient tolerating general diet well. Problem: DISCHARGE BARRIERS  Goal: Patient's continuum of care needs are met  10/25/2020 0252 by Shelby Murry RN  Outcome: Ongoing  Note: Patient plans to return home at discharge. Problem: Falls - Risk of:  Goal: Will remain free from falls  Description: Will remain free from falls  10/25/2020 0252 by Shelby Murry RN  Outcome: Ongoing  Note: Patient free from falls this shift. Patient ambulates with standby assist.  Bed alarm on for patient safety. Care plan reviewed with patient. Patient verbalizes understanding of the plan of care and contribute to goal setting.

## 2020-10-25 NOTE — PROGRESS NOTES
Hospitalist Progress Note    Patient:  Palma Leyden      Unit/Bed:6A-14/014-A    YOB: 1955    MRN: [id number]       Acct: [de-identified]     PCP: PRIYANK [facility], APRN - CNP    Date of Admission: 10/21/2020    Assessment/Plan:    1. COVID-19 infection--on Decadron 6 mg daily, Lovenox 40 mg twice a day; Remdesivir 10/21; on vitamin C, vitamin D, zinc; convalescent plasma 10/21, 10/22; remains on high flow at 75% FiO2 and 60 L  2. Acute hypoxic respiratory failure--on HFNC at 75% 02 and 60L; wean as able; was intubated 10/3-10/8; he is talking in complete sentences relates to increase in shortness of breath with exertion when he goes to the restroom   3. Pneumonia, bibasilar (POA) likely secondary to COVID-19--see #1; Maxipime 10/21; Vanc 10/21-10/24; was intubated 10/3-10/8  4. Possible UTI (POA) with E. coli--Maxipime (S) 10/21; asymptomatic  5. DAI on CKD stage III--improvement; looks like baseline is around 1.4 area; have to be very cautious with fluids secondary to his systolic heart failure  6. Hyperkalemia--resolved   7. Nausea, vomiting, diarrhea--C diff toxin (-); Immodium PRN; resolved  8. Diabetes mellitus type 2--on Lantus along with sliding scale insulin; hemoglobin A1c was 6.7 July 2020; monitor  9. Essential hypertension, uncontrolled--Lasix, Hydralazine, BB, Aldactone; monitor  10. Chronic systolic heart failure--EF noted to be 25 to 30%; on Aldactone, Entresto, BB, Lasix, Hydralazine; LifeVest in place  11. COPD/asthma--Spiriva, Symbicort, Ventolin; requiring high flow oxygen at this time  12. Hyperlipidemia--not on statin  13. JOSE A--CPAP  14. GERD--PPI  15.  Obesity with BMI 31.12    Expected discharge date:  TBD    Disposition:    [x] Home       [] TCU       [] Rehab       [] Psych       [] SNF       [] [place]       [] Other-    Chief Complaint: fatigue, diarrhea, loss of appetite    Hospital Course: per initial H&P from 10/21: \"65 y.o. male who presented to [facility] [address] with several day history of worsening fatigue, diarrhea, vomiting. Patient was recently discharged on 10/13/2020 from [facility] for severe pneumonia and sepsis. Patient was initially intubated on presentation and extubated 5 days later. Patient was given IV Zosyn x 7 days for severe pneumonia and improved rapidly. Patient was able to be discharged home on 10/13/2020 as he was no longer requiring antibiotic therapy or oxygen therapy. While at home patient states that he became very fatigued and had a very poor appetite. He states that he has been having diarrhea on and off  and productive cough since 10/13/2020 and has been having a hard time keeping food down recently. He states that he is also been having some abdominal pain and bloating with this as well. Patient states that today he became very short of breath while at home and very weak, emergency squad was called and patient was found to be hypoxic down to 89% while in transport to the ED. he was placed on 4 L of O2 via nasal cannula. In the ED patient was swabbed for COVID-19 infection which was positive. Patient continued to be hypoxic and required increased to 6 L O2 via nasal cannula. Patient also was noted to have an elevated procalcitonin to 0.36, mildly elevated AST to 77, and abnormal urinalysis with positive nitrites and leukocyte estrase and trace blood. His creatinine was mildly elevated at 1.3. Troponin negative, BNP negative. Patient denied any chest pain, swelling in his legs. He admits to shortness of breath, abdominal pain, diarrhea, vomiting, fatigue. Patient was admitted to hospitalist service for further management of his acute hypoxic respiratory failure secondary to COVID-19 infection with possible superimposed hospital-acquired pneumonia. \"    10/22-->on HFNC; still with diarrhea      10/23--> patient is much more alert today, no complaints of diarrhea, states his breathing is improving; he remains on high flow    10/24--> remains on high flow oxygen at 80% FiO2 and 60 L and currently satting 94%; states overall he is feeling better however \"gets quite winded \"when he goes to the restroom; eating better; PT/OT are on his case    10/25--> remains on high flow oxygen at 75% and 60 L; he states he is forcing himself to eat; afebrile; creatinine improved to 1.5 today; sodium 134;     Subjective (past 24 hours): States overall he is feeling better, he is eating better and states that his shortness of breath has improved however still quite short of breath with exertion    Medications:  Reviewed    Infusion Medications    dextrose       Scheduled Medications    HYDROcodone-chlorpheniramine  5 mL Oral Nightly    aspirin  81 mg Oral Daily    baclofen  10 mg Oral Daily    canagliflozin  100 mg Oral QAM AC    finasteride  5 mg Oral Daily    budesonide-formoterol  2 puff Inhalation BID    furosemide  20 mg Oral Daily    hydrALAZINE  10 mg Oral BID    insulin glargine  60 Units Subcutaneous BID    isosorbide mononitrate  30 mg Oral Daily    Liraglutide  1.2 mg Subcutaneous Daily    metoprolol succinate  150 mg Oral BID    modafinil  100 mg Oral Daily    therapeutic multivitamin-minerals  1 tablet Oral Daily    pantoprazole  40 mg Oral QAM AC    pregabalin  75 mg Oral BID    sacubitril-valsartan  1 tablet Oral BID    spironolactone  12.5 mg Oral Daily    tamsulosin  0.4 mg Oral Daily    tiotropium  2 puff Inhalation Daily    sodium chloride  20 mL Intravenous Once    sodium chloride flush  10 mL Intravenous 2 times per day    dexamethasone  6 mg Intravenous Q24H    insulin lispro  0-12 Units Subcutaneous TID     insulin lispro  0-6 Units Subcutaneous Nightly    cefepime  2 g Intravenous Q12H    vitamin C  1,000 mg Oral Daily    Vitamin D  1,000 Units Oral Daily    zinc sulfate  50 mg Oral Daily    lactobacillus  1 capsule Oral Daily with breakfast    enoxaparin  40 mg Subcutaneous Q12H    remdesivir IVPB  100 mg Intravenous Q24H     PRN Meds: loperamide, albuterol, dicyclomine, polyethylene glycol, acetaminophen **OR** acetaminophen, sodium chloride flush, promethazine **OR** ondansetron, glucose, dextrose, glucagon (rDNA), dextrose, sodium chloride      Intake/Output Summary (Last 24 hours) at 10/25/2020 0803  Last data filed at 10/24/2020 1409  Gross per 24 hour   Intake 442.78 ml   Output --   Net 442.78 ml       Diet:  DIET CARB CONTROL; Dietary Nutrition Supplements: Diabetic Oral Supplement    Exam:  /81   Pulse 74   Temp 97.9 °F (36.6 °C) (Oral)   Resp 20   Ht 5' 10\" (1.778 m)   Wt 216 lb 14.4 oz (98.4 kg)   SpO2 97%   BMI 31.12 kg/m²     General appearance: No apparent distress, appears older than stated age and cooperative. Chronically ill appearing. HEENT: Pupils equal, round, and reactive to light. Conjunctivae/corneas clear. Neck: Supple, with full range of motion. No jugular venous distention. Trachea midline. Respiratory:  Normal respiratory effort. Diminished to auscultation, bilaterally without Rales/Wheezes/Rhonchi. Able to speak in complete sentences  Cardiovascular: Regular rate and rhythm with normal S1/S2 without murmurs, rubs or gallops. LifeVest in place  Abdomen: Soft, non-tender, non-distended with normal bowel sounds. Musculoskeletal: passive and active ROM x 4 extremities. Skin: Skin color, texture, turgor normal.    Neurologic:  Neurovascularly intact without any focal sensory/motor deficits.  Cranial nerves: II-XII intact, grossly non-focal.  Psychiatric: Alert and oriented, thought content appropriate  Capillary Refill: Brisk,< 3 seconds   Peripheral Pulses: +2 palpable, equal bilaterally       Labs:   Recent Labs     10/22/20  0915 10/23/20  0749 10/24/20  0304   WBC 9.8 8.9 9.7   HGB 15.9 15.4 15.2   HCT 51.8 50.0 48.9    198 180     Recent Labs     10/22/20  0915 10/23/20  0749 10/23/20  1813 10/24/20  0304   * 136  --  133* K 5.1 5.5* 5.3* 4.7   CL 99 99  --  98   CO2 19* 22*  --  21*   BUN 29* 41*  --  44*   CREATININE 1.5* 1.7*  --  1.7*   CALCIUM 9.1 8.9  --  8.6     Recent Labs     10/22/20  0915 10/23/20  0749 10/24/20  0304   AST 76* 85* 77*   ALT 40 37 35   BILIDIR 0.3 0.3 0.3   BILITOT 1.2 1.2 1.1   ALKPHOS 116 111 115     Recent Labs     10/22/20  0915 10/23/20  0749 10/24/20  0304   INR 0.95 1.00 1.05     No results for input(s): [place] in the last 72 hours. No results for input(s): PROCAL in the last 72 hours. Microbiology:    Urine with E. coli  COVID 19 (+)  C diff (-)  Legionella (-)  Strep pneumoniae (-)    Urinalysis:      Lab Results   Component Value Date    NITRU POSITIVE 10/21/2020    WBCUA 25-50 10/21/2020    BACTERIA MANY 10/21/2020    RBCUA 0-2 10/21/2020    BLOODU TRACE 10/21/2020    SPECGRAV >1.030 10/08/2020    GLUCOSEU >= 1000 10/21/2020       Radiology:  US ABDOMEN LIMITED   Final Result   Unremarkable. No ascites. **This report has been created using voice recognition software. It may contain minor errors which are inherent in voice recognition technology. **      Final report electronically signed by Dr. Farshad Martin on 10/22/2020 7:01 AM      XR CHEST PORTABLE   Final Result   1. Bibasilar atelectasis/infiltrate. 2. Mild stable cardiomegaly. **This report has been created using voice recognition software. It may contain minor errors which are inherent in voice recognition technology. **      Final report electronically signed by Dr. Kiah Nguyen on 10/21/2020 4:37 PM          DVT prophylaxis: [x] Lovenox 40 mg BID                                 [] SCDs                                 [] SQ Heparin                                 [] Encourage ambulation           [] Already on Anticoagulation     Code Status: Full Code    Tele:   [x] yes SR HR 84             [] no    Active Hospital Problems    Diagnosis Date Noted    Pneumonia due to COVID-19 virus [U07.1, J12.89]     Nausea vomiting and diarrhea [R11.2, R19.7]     Sinus tachycardia [R00.0]     Elevated AST (SGOT) [R74.01]     History of pneumonia [Z87.01]     History of COPD [Z87.09]     History of asthma [Z87.09]     Hx of coronary artery disease [Z86.79]     Hyperlipidemia [E78.5]     Hx of gastroesophageal reflux (GERD) [Z87.19]     Acute respiratory failure due to COVID-19 (HCC) [U07.1, J96.00] 80/36/7246    Systolic CHF, acute on chronic [facility]) [I50.23]        Electronically signed by KRYSTAL Romero CNP on 10/25/2020 at 8:03 AM

## 2020-10-25 NOTE — PROGRESS NOTES
I called Lawanda, his wife, updated her on the patient (husbands) status. Answered all questions to her satisfaction.

## 2020-10-26 NOTE — PROGRESS NOTES
[facility]  PHYSICAL THERAPY MISSED TREATMENT NOTE  STRZ 6A [facility]OLA    Date: 10/26/2020  Patient Name: Lee Guillermo        MRN: [id number]   : 1955  (76 y.o.)  Gender: male   Referring Practitioner: Shauna RICE-CNP  Diagnosis: Acute Respiratory failure due to [address] TREATMENT:  Hold treatment per nursing request.  Pt had low blood sugar at 47 when attempted this morning and needed to eat. At second attempt in pm, pt's RN reports that pt is on O2 at 40 lpm and 90% FiO2 and drops into mid 80's just trying to sit up. Will check back tomorrow. Ginny Meza.  Abdullahi Becker, [place] [address]

## 2020-10-26 NOTE — PROGRESS NOTES
Pt called out  To go to bathroom. Pt had high flow oxygen out of nose. Pt disoriented and sats in 62s.   Oxygen put back on and turned up to 100% and sats came up to 92% and pt oriented x 4..  Pt instructed to take deep breaths and rest

## 2020-10-26 NOTE — PROGRESS NOTES
Midline insertion Procedure Note    Lawrence Chatterjee   Admitted- 10/21/2020  3:20 PM  Admission diagnosis- Acute respiratory failure due to COVID-19 ([facility] [U07.1, J96.00]      Attending Physician- KRYSTAL Lipscomb *  Ordering Physician-same  Indication for Insertion: Poor Vascular Access    Catheter Insertion Date- 10/26/2020   Catheter Brand-BioFlo   Lot Number- [id number]  Gauge-5  Lumen-dual    Insertion Site- ANGELITO Basilic  Vein Diameter- 3 mm  Catheter Length- 15 cm  Internal Length- 15 cm  Exposed Catheter Length- 0cm   Midline Tip Terminates in the Axillary- Yes  Upper Arm Circumference- 33cm  Easy insertion- Yes  Able to Aspirate blood- Yes  Easy Flush- Yes    Midline insertion successful- Yes  Ultrasound- yes    Okay To Use Midline- Yes    Electronically signed by Ayleen Waters RN on 10/26/2020 at 3:01 PM

## 2020-10-26 NOTE — PROGRESS NOTES
Walked into patient's room because this RN noticed SPO2 at 60% and found patient with his high flow nasal cannula torn off and broken. Placed patient on non rebreather until respiratory arrived with new nasal cannula for high flow.  Pt returned to heated high flow nasal cannula at 75% at 60L and tolerated well

## 2020-10-26 NOTE — PROGRESS NOTES
negative. Patient denied any chest pain, swelling in his legs. He admits to shortness of breath, abdominal pain, diarrhea, vomiting, fatigue. Patient was admitted to hospitalist service for further management of his acute hypoxic respiratory failure secondary to COVID-19 infection with possible superimposed hospital-acquired pneumonia    Restrictions/Precautions:  Restrictions/Precautions: Fall Risk, Isolation, General Precautions  Position Activity Restriction  Other position/activity restrictions: COVID +, high flow , O2 sats 90-92  . Subjective  Chart Reviewed: Yes, Orders, Progress Notes, History and Physical  Patient assessed for rehabilitation services?: Yes    Subjective: [facility] RN approving session but stating to complete in bed activity only d/t decrease in sats with minimal activity while being on high settings of high flow O2. Upon therapist arrival, pt was siting on [facility] with RN made aware. Pt stating he feels well but seeminlgy gets anxious throughout session with increased diffiuclty breathing. Pt calms down with cues fo rbreathing tech and relaxation    Pain:  Pain Assessment  Patient Currently in Pain: Denies    Social/Functional History:  Lives With: Alone  Type of Home: Apartment  Home Layout: One level  Home Access: Elevator  Home Equipment: 4 wheeled walker           ADL Assistance: Independent  Ambulation Assistance: Independent  Transfer Assistance: Independent          Additional Comments: Pt uses rollator  at home and had [facility] therapy    VISION:WNL    HEARING:  WNL    COGNITION: Decreased Insight and Decreased Safety Awareness    RANGE OF MOTION:  Bilateral Upper Extremity:  WNL    STRENGTH:  Bilateral Upper Extremity:  WNL    ADL:   Grooming: Stand By Assistance. seated  Lower Extremity Dressing: Moderate Assistance. donning new shorts  Toileting: Stand By Assistance. for hygiene in a seated position   Toilet Transfer: Minimal Assistance. from [facility].     BALANCE:  Standing Balance: Minimal Assistance. during clothign management. Pt with a decrease in O2 sats to 87% during equiring cues for breathing tech     BED MOBILITY:  Sit to Supine: Minimal Assistance      TRANSFERS:  Sit to Stand:  Minimal Assistance. Stand to Sit: Minimal Assistance. FUNCTIONAL MOBILITY:  Assistive Device: hand held assist   Assist Level:  Minimal Assistance. Distance: x 3 feet from [facility] to bed  Cues for safety during. Pt with decrease in O2 sats and increased SOB required increased time at eOB with cues for breathing tech and to relax to slow breathing down        Activity Tolerance:  Patient tolerance of  treatment: fair. Increased SOB with decrease in O2 sats while on high flow. Pt requiring frequent cues for breathing tech and to relax      Assessment:  Assessment: Pt COVID + demo increased SOB and decrease in O2 sats. Pt on high flow O2 at this time. Pt requiring increased assistance with all ADL asks d/t decreased endruance with decrease in O2 sats while on high flow. Pt would benefit from skilled OT Services to increase his endurance and to be more indep with ADL tasks to return home with spouse. Performance deficits / Impairments: Decreased functional mobility , Decreased endurance, Decreased ADL status, Decreased balance, Decreased strength, Decreased safe awareness  Prognosis: Fair  REQUIRES OT FOLLOW UP: Yes  Decision Making: Medium Complexity    Treatment Initiated: Treatment and education initiated within context of evaluation. Evaluation time included review of current medical information, gathering information related to past medical, social and functional history, completion of standardized testing, formal and informal observation of tasks, assessment of data and development of plan of care and goals. Treatment time included skilled education and facilitation of tasks to increase safety and independence with ADL's for improved functional independence and quality of life.     Discharge

## 2020-10-26 NOTE — PROGRESS NOTES
Hospitalist Progress Note    Patient:  Lashonda Miller      Unit/Bed:6A-14/014-A    YOB: 1955    MRN: [id number]       Acct: [de-identified]     PCP: KRYSTAL Crowder CNP    Date of Admission: 10/21/2020    Assessment/Plan:    1. COVID-19 infection--on Decadron 6 mg daily, Lovenox 40 mg twice a day; Remdesivir 10/21; on vitamin C, vitamin D, zinc; convalescent plasma 10/21, 10/22; remains on high flow at 95% FiO2 and 60 L; wean as able  2. Acute hypoxic respiratory failure--on HFNC at 95% 02 and 60L; wean as able; was intubated 10/3-10/8; he is talking in complete sentences   3. Pneumonia, bibasilar (POA) likely secondary to COVID-19--see #1; Maxipime 10/21; Vanc 10/21-10/24; was intubated 10/3-10/8  4. Possible UTI (POA) with E. coli--Maxipime (S) 10/21; asymptomatic  5. DAI on CKD stage III--improvement; looks like baseline is around 1.4 area; have to be very cautious with fluids secondary to his systolic heart failure  6. Hyperkalemia--resolved   7. Nausea, vomiting, diarrhea--C diff toxin (-); Immodium PRN; resolved  8. Diabetes mellitus type 2--on Lantus along with sliding scale insulin; hemoglobin A1c was 6.7 July 2020; since patient was hypoglycemic we will decrease his nighttime dose of Lantus and monitor   9. Essential hypertension, uncontrolled--Lasix, Hydralazine, BB, Aldactone; monitor  10. Chronic systolic heart failure--EF noted to be 25 to 30%; on Aldactone, Entresto, BB, Lasix, Hydralazine; LifeVest in place  11. COPD/asthma--Spiriva, Symbicort, Ventolin; requiring high flow oxygen at this time  12. Hyperlipidemia--not on statin  13. JOSE A--CPAP  14. GERD--PPI  15.  Obesity with BMI 30.25    Expected discharge date:  TBD    Disposition:    [x] Home       [] TCU       [] Rehab       [] Psych       [] SNF       [] [place]       [] Other-    Chief Complaint: fatigue, diarrhea, loss of appetite    Hospital Course: per initial H&P from 10/21: \"65 y.o. male who presented to [facility] with several day history of worsening fatigue, diarrhea, vomiting. Patient was recently discharged on 10/13/2020 from [facility] for severe pneumonia and sepsis. Patient was initially intubated on presentation and extubated 5 days later. Patient was given IV Zosyn x 7 days for severe pneumonia and improved rapidly. Patient was able to be discharged home on 10/13/2020 as he was no longer requiring antibiotic therapy or oxygen therapy. While at home patient states that he became very fatigued and had a very poor appetite. He states that he has been having diarrhea on and off  and productive cough since 10/13/2020 and has been having a hard time keeping food down recently. He states that he is also been having some abdominal pain and bloating with this as well. Patient states that today he became very short of breath while at home and very weak, emergency squad was called and patient was found to be hypoxic down to 89% while in transport to the ED. he was placed on 4 L of O2 via nasal cannula. In the ED patient was swabbed for COVID-19 infection which was positive. Patient continued to be hypoxic and required increased to 6 L O2 via nasal cannula. Patient also was noted to have an elevated procalcitonin to 0.36, mildly elevated AST to 77, and abnormal urinalysis with positive nitrites and leukocyte estrase and trace blood. His creatinine was mildly elevated at 1.3. Troponin negative, BNP negative. Patient denied any chest pain, swelling in his legs. He admits to shortness of breath, abdominal pain, diarrhea, vomiting, fatigue. Patient was admitted to hospitalist service for further management of his acute hypoxic respiratory failure secondary to COVID-19 infection with possible superimposed hospital-acquired pneumonia. \"    10/22-->on HFNC; still with diarrhea      10/23--> patient is much more alert today, no complaints of diarrhea, states his breathing is improving; he remains on high flow    10/24--> remains on high flow oxygen at 80% FiO2 and 60 L and currently satting 94%; states overall he is feeling better however \"gets quite winded \"when he goes to the restroom; eating better; PT/OT are on his case    10/25--> remains on high flow oxygen at 75% and 60 L; he states he is forcing himself to eat; afebrile; creatinine improved to 1.5 today; sodium 134     10/26--> still short of breath however today on high flow at FiO2 95% and 60 L his sat was 99%; did drop down to 88% on 90% FiO2 in the night; he was hypoglycemic this morning down to 47 which was treated so we will decrease his Lantus and continue to monitor    Subjective (past 24 hours): States overall he is feeling better, states he still gets winded quite easily    Medications:  Reviewed    Infusion Medications    dextrose       Scheduled Medications    aspirin  81 mg Oral Daily    baclofen  10 mg Oral Daily    finasteride  5 mg Oral Daily    budesonide-formoterol  2 puff Inhalation BID    furosemide  20 mg Oral Daily    hydrALAZINE  10 mg Oral BID    insulin glargine  60 Units Subcutaneous BID    isosorbide mononitrate  30 mg Oral Daily    metoprolol succinate  150 mg Oral BID    modafinil  100 mg Oral Daily    therapeutic multivitamin-minerals  1 tablet Oral Daily    pantoprazole  40 mg Oral QAM AC    pregabalin  75 mg Oral BID    sacubitril-valsartan  1 tablet Oral BID    spironolactone  12.5 mg Oral Daily    tamsulosin  0.4 mg Oral Daily    tiotropium  2 puff Inhalation Daily    sodium chloride  20 mL Intravenous Once    sodium chloride flush  10 mL Intravenous 2 times per day    dexamethasone  6 mg Intravenous Q24H    insulin lispro  0-12 Units Subcutaneous TID WC    insulin lispro  0-6 Units Subcutaneous Nightly    cefepime  2 g Intravenous Q12H    vitamin C  1,000 mg Oral Daily    Vitamin D  1,000 Units Oral Daily    zinc sulfate  50 mg Oral Daily    lactobacillus  1 capsule Oral Daily with breakfast    enoxaparin  40 mg Subcutaneous Q12H     PRN Meds: loperamide, albuterol, dicyclomine, polyethylene glycol, acetaminophen **OR** acetaminophen, sodium chloride flush, promethazine **OR** ondansetron, glucose, dextrose, glucagon (rDNA), dextrose, sodium chloride      Intake/Output Summary (Last 24 hours) at 10/26/2020 0617  Last data filed at 10/26/2020 1575  Gross per 24 hour   Intake 1008.68 ml   Output --   Net 1008.68 ml       Diet:  DIET CARB CONTROL; Dietary Nutrition Supplements: Diabetic Oral Supplement    Exam:  /68   Pulse 76   Temp 98.4 °F (36.9 °C) (Oral)   Resp 24   Ht 5' 10\" (1.778 m)   Wt 210 lb 12.8 oz (95.6 kg)   SpO2 92%   BMI 30.25 kg/m²     General appearance: No apparent distress, appears older than stated age and cooperative. Chronically ill appearing. HEENT: Pupils equal, round, and reactive to light. Conjunctivae/corneas clear. Neck: Supple, with full range of motion. No jugular venous distention. Trachea midline. Respiratory:  Normal respiratory effort. Diminished to auscultation but improved air exchange today without Rales/Wheezes/Rhonchi. Able to speak in complete sentences  Cardiovascular: Regular rate and rhythm with normal S1/S2 without murmurs, rubs or gallops. LifeVest in place  Abdomen: Soft, non-tender, round with normal bowel sounds. Musculoskeletal: passive and active ROM x 4 extremities. Skin: Skin color, texture, turgor normal.    Neurologic:  Neurovascularly intact without any focal sensory/motor deficits.  Cranial nerves: II-XII intact, grossly non-focal.  Psychiatric: Alert and oriented, thought content appropriate  Capillary Refill: Brisk,< 3 seconds   Peripheral Pulses: +2 palpable, equal bilaterally       Labs:   Recent Labs     10/23/20  0749 10/24/20  0304 10/25/20  0832   WBC 8.9 9.7 9.9   HGB 15.4 15.2 15.1   HCT 50.0 48.9 49.4    180 180     Recent Labs     10/23/20  0749 10/23/20  1813 10/24/20  0304 10/25/20  0832     --  133* 134*   K 5.5* 5.3* 4.7 4.7   CL 99  --  98 104   CO2 22*  --  21* 17*   BUN 41*  --  44* 50*   CREATININE 1.7*  --  1.7* 1.5*   CALCIUM 8.9  --  8.6 9.1     Recent Labs     10/23/20  0749 10/24/20  0304 10/25/20  0832   AST 85* 77* 64*   ALT 37 35 40   BILIDIR 0.3 0.3 0.3   BILITOT 1.2 1.1 1.4*   ALKPHOS 111 115 149*     Recent Labs     10/23/20  0749 10/24/20  0304 10/25/20  0832   INR 1.00 1.05 0.94     Microbiology:    Urine with E. coli  COVID 19 (+)  C diff (-)  Legionella (-)  Strep pneumoniae (-)    Urinalysis:      Lab Results   Component Value Date    NITRU POSITIVE 10/21/2020    WBCUA 25-50 10/21/2020    BACTERIA MANY 10/21/2020    RBCUA 0-2 10/21/2020    BLOODU TRACE 10/21/2020    SPECGRAV >1.030 10/08/2020    GLUCOSEU >= 1000 10/21/2020       Radiology:  US ABDOMEN LIMITED   Final Result   Unremarkable. No ascites. **This report has been created using voice recognition software. It may contain minor errors which are inherent in voice recognition technology. **      Final report electronically signed by Dr. Neal Alejo on 10/22/2020 7:01 AM      XR CHEST PORTABLE   Final Result   1. Bibasilar atelectasis/infiltrate. 2. Mild stable cardiomegaly. **This report has been created using voice recognition software. It may contain minor errors which are inherent in voice recognition technology. **      Final report electronically signed by Dr. Liana Yadav on 10/21/2020 4:37 PM          DVT prophylaxis: [x] Lovenox 40 mg BID                                 [] SCDs                                 [] SQ Heparin                                 [] Encourage ambulation           [] Already on Anticoagulation     Code Status: Full Code    Tele:   [x] yes SR HR 68             [] no    Active Hospital Problems    Diagnosis Date Noted    Pneumonia due to COVID-19 virus [U07.1, J12.89]     Nausea vomiting and diarrhea [R11.2, R19.7]     Sinus tachycardia [R00.0]     Elevated AST (SGOT) [R74.01]     History of pneumonia [Z87.01]     History of COPD [Z87.09]     History of asthma [Z87.09]     Hx of coronary artery disease [Z86.79]     Hyperlipidemia [E78.5]     Hx of gastroesophageal reflux (GERD) [Z87.19]     Acute respiratory failure due to COVID-19 ([facility] [address] [U07.1, J96.00] 35/23/1675    Systolic CHF, acute on chronic [facility]) [I50.23]        Electronically signed by KRYSTAL Olmedo CNP on 10/26/2020 at 6:17 AM

## 2020-10-26 NOTE — CARE COORDINATION
10/26/20, 8:38 AM EDT    DISCHARGE ON [address] day: 5  Location: -14/014-A Reason for admit: Acute respiratory failure due to COVID-19 ([facility] [[email or web address] [U07.1, J96.00]   Procedure: Plasma 10-21 and 10-22  Treatment Plan of Care: Afebrile. On HHF 60/95% with O2 sat 98%. Cefepime, Dexamethasone, Lasix, Lovenox, Vits and Zinc. Remdesivir completed yesterday. PICC line ordered for today as difficulty keeping line with pt. Barriers to Discharge: Respiratory stability. PCP: KRYSTAL Lee - CNP  Readmission Risk Score: 29%  Patient Goals/Plan/Treatment Preferences: From home alone,  from wife. Plans return home alone with [facility] but cont to follow for additional needs.

## 2020-10-26 NOTE — PROGRESS NOTES
Attempted IV insertion three times with no success. Informed Pako Nelson RN patient may need a Midline.

## 2020-10-27 NOTE — FLOWSHEET NOTE
[id number] [place] [id number]  Notice of Patient Passing      Patient Name- Kelle Soliman Number- [de-identified]   Attending Physician- KRYSTAL Amezcua *    Admitted on-10/21/2020  3:20 PM   patient was:   Absence of vital signs. Absence of neurological response. Confirmed time of death at 56. Physician or On-call Physician notified of time of death- yes    Family present at time of death- Family notified of code and arrived shortly after   Spiritual care present at time of death- arrived to be with family    Physician was notified and orders were obtained to release the body. Post-Mortem documentation completed; form printed, signed, and given to admitting.     Mel Dewitt RN Nursing Supervisor/ Manager  10/27/20   5:44 AM

## 2020-10-27 NOTE — FLOWSHEET NOTE
[place] [address] PROGRESS NOTE      Patient: Gabyb Haider  Room #: 1L-32/081-R            YOB: 1955  Age: 76 y.o. Gender: male            Admit Date & Time: 10/21/2020  3:20 PM    Assessment:  Pt. Is a 76year old male. Patient is admitted on 6A Covid-19 unit and coded during early morning hour.  received a call from [address] [place] [address] requesting for spiritual support for family.  arrived and met patient's wife with the nurse and and the tending doctor also came to update patient's wife on what has happened and told her that patient has . Patient's wife is tearful during this encounter. Interventions:  I offered comfort and consolation. I read calming death related scriptures and offered prayer with the 's wife. I also assisted her with filing out the primary contact for bereavement packet form. I provided active listening and encouragement. Outcomes:  Patient's wife Massimo Burroughs showed gratitude for the prayer and kind words of support. She also said she appreciate the care all the staff members have provided for her  and the family. Plan:  1. [place] will continue to hold Jonathan Velez and her family up in prayer as they make  arrangements for her  Holly Arroyo). May his soul rest in peace.      Electronically signed by Kaylen Sandoval on 10/27/2020 at [address]  [phone]

## 2020-10-27 NOTE — PROCEDURES
ICU PROCEDURE - ENDOTRACHEAL INTUBATION    Martha Arthur     MRN#: [id number]  10/27/20      Acct [email or web address] Seen on TV     : 1955      INDICATION: Code Blue    TIME OUT: taken    Permission obtained, risks/benefits reviewed:    ANESTHESIA:   []Ketamine  []Ativan  [] Morphine  []Propofol  []Other medications:      ESTIMATED BLOOD LOSS:  None. COMPLICATIONS:  []N/A  [] Other:    LARYNGOSCOPIC AIRWAY GRADE (CORMACK-LEHANE):[]1  []2a  []2b [x]3  []4        INTUBATION EQUIPMENT USED:  [x] Direct laryngoscope only    OUTCOME: Successful placement of #  7.5  Taperguard Evac endotracheal via   [x]Oral route    INSERTION DEPTH:  24    cm from   [x]lip           CONFIRMATION OF TUBE POSITION:   [x]Capnography - Strong & repeatable exhaled CO2 detection   [x]Multiple point auscultation   [x]SpO2 response   []STAT X-ray   []Bronchoscopic assessment    UNUSUAL FINDINGS:    PROCEDURE:     Using direct laryngoscopy, the vocal cords were visualized and the endotracheal tube was placed through the cords under direct vision. Good breath sounds were auscultated bilaterally without sounds over abdomen. Appropriate strong & repeatable exhaled CO2 detection was confirmed.        Electronically signed by KRYSTAL Haider CNP on 10/27/2020 at 4:43 AM

## 2021-01-13 NOTE — DISCHARGE SUMMARY
Hospital Medicine Death Summary      Patient Identification:   Kai Johnson   : 1955  MRN: [id number]   Account: [de-identified]      Patient's PCP: KRYSTAL Katz CNP    Admit Date: 10/21/2020     Discharge Date: 10/27/2020      Admitting Physician: KRYSTAL Muse CNP     Discharging Nurse Practitioner: KRYSTAL Muse CNP     Discharge Diagnoses with Assessment/Plan:  1. Cardiopulmonary arrest  2. COVID-19 infection-  3. Acute hypoxic respiratory failure-  4. Pneumonia, bibasilar (POA) likely secondary to COVID-19-  5. Possible UTI (POA) with E. coli-  6. DAI on CKD stage III  7. Hyperkalemia   8. Nausea, vomiting, diarrhea-  9. Diabetes mellitus type 2  10. Chronic systolic heart failure    The patient was seen and examined on day of discharge and this discharge summary is in conjunction with any daily progress note from day of discharge. Hospital Course:   Kai Johnson is a 76 y.o. male admitted to [facility] on 10/21/2020 for fever, diarrhea, loss of appetite; per initial H&P from 10/21: \"65 y. o. male who presented to [facility] with several day history of worsening fatigue, diarrhea, vomiting.  Patient was recently discharged on 10/13/2020 from [facility] for severe pneumonia and sepsis.  Patient was initially intubated on presentation and extubated 5 days later.  Patient was given IV Zosyn x 7 days for severe pneumonia and improved rapidly.  Patient was able to be discharged home on 10/13/2020 as he was no longer requiring antibiotic therapy or oxygen therapy.  While at home patient states that he became very fatigued and had a very poor appetite.  He states that he has been having diarrhea on and off  and productive cough since 10/13/2020 and has been having a hard time keeping food down recently.  He states that he is also been having some abdominal pain and bloating with this as well.  Patient states that today he became very short of breath while at home and very weak, emergency squad was called and patient was found to be hypoxic down to 89% while in transport to the ED. he was placed on 4 L of O2 via nasal cannula.  In the ED patient was swabbed for COVID-19 infection which was positive.  Patient continued to be hypoxic and required increased to 6 L O2 via nasal cannula.  Patient also was noted to have an elevated procalcitonin to 0.36, mildly elevated AST to 77, and abnormal urinalysis with positive nitrites and leukocyte estrase and trace blood.  His creatinine was mildly elevated at 1.3.  Troponin negative, BNP negative.  Patient denied any chest pain, swelling in his legs.  He admits to shortness of breath, abdominal pain, diarrhea, vomiting, fatigue.  Patient was admitted to hospitalist service for further management of his acute hypoxic respiratory failure secondary to COVID-19 infection with possible superimposed hospital-acquired pneumonia. \"     10/22-->on HFNC; still with diarrhea       10/23--> patient is much more alert today, no complaints of diarrhea, states his breathing is improving; he remains on high flow     10/24--> remains on high flow oxygen at 80% FiO2 and 60 L and currently satting 94%; states overall he is feeling better however \"gets quite winded \"when he goes to the restroom; eating better; PT/OT are on his case     10/25--> remains on high flow oxygen at 75% and 60 L; he states he is forcing himself to eat; afebrile; creatinine improved to 1.5 today; sodium 134    10/26--> patient had a arrest with telemetry showing PEA, code was ran however patient  on 10/27/2020 at 0401       Condition at Discharge:     Time Spent on discharge is more than 15 minutes in the examination, evaluation, counseling and review of medications and discharge plan. Signed: Thank you PRIYANK [facility], APRN - CNP for the opportunity to be involved in this patient's care.     Electronically signed by Emory Charles, APRN - CNP on 1/13/2021 at 1:31 PM

## 2022-02-25 NOTE — TELEPHONE ENCOUNTER
Thank you. Patient requests all Lab, Cardiology, and Radiology Results on their Discharge Instructions

## 2022-03-29 NOTE — PROGRESS NOTES
[facility]     Patient: Yang Monge, 76 y.o.   : 1955    Pt of Dr. Khadra Llamas   Patient presents with    6 Month Follow-Up     COPD 6 mo f/u withPFT 2-24-20     Other     DNQ   Neck 18 in   Mal IV        HPI  Radha Soria is here for 6 month  follow up for COPD with PFT completed back in 2020  C/o SOB with stairs, does have to climb 6 flights to get to his apartment,normally takes elevator but recently tried to do stairs for more activity,  this has become more of a struggle than in past.   Has had few episodes of severe SOB, \"felt like hear attack\" relieved in few min with albuterol and wearing CPAP   6 MWT 2020- no hypoxia  Neg COVID test 2020  35-40% EF on ECHO, referred to cardiology, abn. EKG, underwent cath in 2020- clean cath, cancelled f/u due to COVID   Using his Advair 250/50 and Incruse daily, seldomly using his rescue inhaler. ,   Does have component of asthma, noticed allergy symptoms x 1 month with nasal congestion, sinus pressure, sneezing. Not on any antihistamines. never had problems with allergies in past.     Past Medical hx   PMH:  Past Medical History:   Diagnosis Date    Acid reflux     Anxiety     Arthritis     in foot     Asthma     Change in bowel habits     Constipation     COPD (chronic obstructive pulmonary disease) (HCC)     COPD (chronic obstructive pulmonary disease) (HCC)     Depression     Diarrhea     Heartburn     Hyperlipidemia     Hypertension     Hypogonadism male     Loss of vision     Migraine     Neuralgia     Shortness of breath     Sleep apnea     Testosterone deficiency in male     Type 2 diabetes mellitus ([facility] [address]      SURGICAL HISTORY:  Past Surgical History:   Procedure Laterality Date    CARDIAC CATHETERIZATION      EYE SURGERY Right 2019    HERNIA REPAIR       SOCIAL HISTORY:  Social History     Tobacco Use    Smoking status: Former Smoker times daily. Dacia Ganser metFORMIN (GLUCOPHAGE-XR) 500 MG extended release tablet Take 500 mg by mouth 2 times daily       metoprolol (LOPRESSOR) 100 MG tablet Take 100 mg by mouth 2 times daily      modafinil (PROVIGIL) 100 MG tablet Take 100 mg by mouth daily. Dacia Ganser omeprazole (PRILOSEC) 20 MG delayed release capsule Take 20 mg by mouth every other day       pioglitazone (ACTOS) 30 MG tablet Take 30 mg by mouth daily      Respiratory Therapy Supplies (NEBULIZER/ADULT MASK) KIT by Does not apply route      Testosterone (TESTOPEL) 75 MG PLLT by Implant route. Dacia Ganser insulin glargine (TOUJEO SOLOSTAR) 300 UNIT/ML injection pen Inject into the skin nightly      blood glucose test strips (TRUE METRIX BLOOD GLUCOSE TEST) strip 1 each by In Vitro route daily As needed.  albuterol sulfate  (90 Base) MCG/ACT inhaler Inhale 2 puffs into the lungs every 6 hours as needed for Wheezing      Liraglutide (VICTOZA) 18 MG/3ML SOPN SC injection Inject 1.2 mg into the skin daily       No current facility-administered medications for this visit. Dipesh GOMES   Review of Systems   Constitutional: Negative for activity change, appetite change, chills, fever and unexpected weight change. HENT: Positive for congestion and sneezing. Eyes: Negative. Respiratory: Positive for shortness of breath. Negative for cough and wheezing. Cardiovascular: Positive for chest pain. Negative for palpitations and leg swelling. Gastrointestinal: Negative for abdominal pain, diarrhea, nausea and vomiting. Genitourinary: Negative. Musculoskeletal: Negative. Skin: Negative. Allergic/Immunologic: Positive for environmental allergies. Neurological: Negative. Hematological: Does not bruise/bleed easily. Psychiatric/Behavioral: Negative for sleep disturbance and suicidal ideas.         Physical exam   BP (!) 150/92 (Site: Left Upper Arm, Position: Sitting, Cuff Size: Medium Adult)   Pulse 80   Temp 98 °F (36.7 °C)                      No Known Allergies      OVERNIGHT EVENTS: NPO for MRI today, no weakness    ICU Vital Signs Last 24 Hrs  T(C): 37 (29 Mar 2022 05:00), Max: 37.2 (29 Mar 2022 02:00)  T(F): 98.6 (29 Mar 2022 05:00), Max: 98.9 (29 Mar 2022 02:00)  HR: 69 (29 Mar 2022 06:00) (58 - 94)  BP: 124/73 (28 Mar 2022 20:30) (107/61 - 135/81)  BP(mean): 85 (28 Mar 2022 20:30) (71 - 98)  ABP: 106/66 (29 Mar 2022 06:00) (100/71 - 114/71)  ABP(mean): 86 (29 Mar 2022 06:00) (0 - 97)  RR: 31 (29 Mar 2022 06:00) (23 - 34)  SpO2: 99% (29 Mar 2022 06:00) (97% - 100%)        EXAM:  awake, alert, affect appropriate  ANGELA x4  L lower facial asymmety-stable to improved  PERRL        MEDICATIONS  (STANDING):  acetaminophen   IV Intermittent - Peds. 275 milliGRAM(s) IV Intermittent every 6 hours  dexAMETHasone IV Intermittent - Pediatric 2 milliGRAM(s) IV Intermittent every 8 hours  dextrose 5% + sodium chloride 0.9% with potassium chloride 20 mEq/L. - Pediatric 1000 milliLiter(s) (55 mL/Hr) IV Continuous <Continuous>  famotidine IV Intermittent - Peds 9.2 milliGRAM(s) IV Intermittent every 12 hours  lactulose Oral Liquid - Peds 4 Gram(s) Oral once  polyethylene glycol 3350 Oral Powder - Peds 8.5 Gram(s) Oral daily  polyethylene glycol 3350 Oral Powder - Peds 8.5 Gram(s) Oral daily  senna Oral Liquid - Peds 3.75 milliLiter(s) Oral daily  sodium chloride 0.9% -  250 milliLiter(s) (3 mL/Hr) IV Continuous <Continuous>    MEDICATIONS  (PRN):  morphine  IV Intermittent - Peds 0.93 milliGRAM(s) IV Intermittent every 6 hours PRN Mild Pain (1 - 3)  ondansetron IV Intermittent - Peds 2.8 milliGRAM(s) IV Intermittent every 8 hours PRN Nausea and/or Vomiting        133<L>  |  99  |  5<L>  ----------------------------<  125<H>  4.1   |  23  |  0.30    Ca    9.5      28 Mar 2022 00:52  Phos  4.0       Mg     2.00         TPro  7.1  /  Alb  4.3  /  TBili  0.2  /  DBili  x   /  AST  23  /  ALT  7   /  AlkPhos  169      CBC Full  -  ( 28 Mar 2022 00:52 )  WBC Count : 9.85 K/uL  RBC Count : 4.07 M/uL  Hemoglobin : 11.8 g/dL  Hematocrit : 36.5 %  Platelet Count - Automated : 689 K/uL  Mean Cell Volume : 89.7 fL  Mean Cell Hemoglobin : 29.0 pg  Mean Cell Hemoglobin Concentration : 32.3 gm/dL  Auto Neutrophil # : 6.68 K/uL  Auto Lymphocyte # : 2.57 K/uL  Auto Monocyte # : 0.51 K/uL  Auto Eosinophil # : 0.05 K/uL  Auto Basophil # : 0.01 K/uL  Auto Neutrophil % : 67.8 %  Auto Lymphocyte % : 26.1 %  Auto Monocyte % : 5.2 %  Auto Eosinophil % : 0.5 %  Auto Basophil % : 0.1 %    PT/INR - ( 28 Mar 2022 00:52 )   PT: 12.9 sec;   INR: 1.11 ratio         PTT - ( 28 Mar 2022 00:52 )  PTT:33.4 sec    Type & Screen (in past 72hrs): Type + Screen (22 @ 10:48)    ABO Interpretation: O    Rh Interpretation: Positive    Antibody Screen: Negative          Comment: Oral  Resp 16   Ht 5' 10\" (1.778 m)   Wt 229 lb (103.9 kg)   SpO2 100% Comment: on RA  BMI 32.86 kg/m²        Physical Exam  Vitals signs and nursing note reviewed. Constitutional:       General: He is not in acute distress. Appearance: He is well-developed. Interventions: Face mask in place. Comments: Face mask on due to COVID 19    HENT:      Mouth/Throat:      Lips: Pink. Mouth: Mucous membranes are moist.      Pharynx: Oropharynx is clear. No oropharyngeal exudate or posterior oropharyngeal erythema. Eyes:      Conjunctiva/sclera: Conjunctivae normal.   Neck:      Vascular: No JVD. Cardiovascular:      Rate and Rhythm: Normal rate and regular rhythm. Heart sounds: No murmur. No friction rub. Pulmonary:      Effort: Pulmonary effort is normal. No accessory muscle usage or respiratory distress. Breath sounds: Normal air entry. Examination of the right-upper field reveals wheezing. Examination of the left-upper field reveals wheezing. Examination of the right-middle field reveals wheezing. Examination of the left-middle field reveals wheezing. Examination of the right-lower field reveals wheezing. Examination of the left-lower field reveals wheezing. Wheezing present. No rhonchi or rales. Comments: Exp wheezes in all lung fields, with good aeration   Chest:      Chest wall: No tenderness. Musculoskeletal:      Right lower leg: No edema. Left lower leg: No edema. Skin:     General: Skin is warm and dry. Capillary Refill: Capillary refill takes less than 2 seconds. Nails: There is no clubbing. Neurological:      Mental Status: He is alert. Psychiatric:         Mood and Affect: Mood normal.         Behavior: Behavior normal.         Thought Content:  Thought content normal.         Judgment: Judgment normal.          Test results   Lung Nodule Screening     [] Qualifies    [x]Does not qualify   [] Declined    [] Completed            Stable PFT with slight reduction in DLCO from previous     SARS-CoV-2 Not Detected  NotDetecte Final       Cardiac Cath 1/29/20     CONCLUSION:  1. No significant coronary artery disease was noted with nondominant  right system. 2.  Normal LV function. 3.  No complication.     RECOMMENDATIONS:  At this point, continuing medical treatment, risk  factor modification is recommended. Evaluation for other causes of the  patient's symptoms primarily pulmonary causes and sleep apnea is  recommended along with risk factor modification and medical management. Fractional Exhaled Nitric Oxide Level today 10, indicating No Th2 driven is well controlled on current ICS. Electronically signed by KRYSTAL Aranda CNP on 7/1/2020     Assessment      Diagnosis Orders   1. Shortness of breath  POCT Nitric Oxide   2. Mixed restrictive and obstructive lung disease ([address]  POCT Nitric Oxide   3. Angina of effort ([address]     4. Seasonal allergic rhinitis due to other allergic trigger         SOB with exertional activity due to COPD/ asthma , obesity and de-conditioning. Plan   -Recommend continued activity, work up to doing full amount of stairs, only take 1-2 flights and then grab elevator, continue to work up conditioning to do more. reality with COPD is he may always have some degree of SOB with strenuous activity.   Utilize ZUNILDA as needed    -do strongly recommend he reschedule a f/u with cardiology for further recommendations as well  -work on wt loss  -continue Advair, normal FENO no indication for additional corticosteroids  -continue Incruse  -add OTC Claritin PRN     Will see Júnior Kimbrough in: 6 months, sooner if any worsening pulmonary symptoms     Electronically signed by KRYSTAL Aranda CNP on 7/1/2020 at 2:09 PM

## 2025-06-28 NOTE — PLAN OF CARE
Dysuria is the medical term for painful or uncomfortable urination, often described as a burning or stinging sensation. It’s a symptom--not a condition itself--and can be caused by a variety of underlying issues.    ?? Common Causes  Urinary Tract Infections (UTIs) - Most common cause, especially in women 1.  Sexually Transmitted Infections (STIs) - Such as chlamydia, gonorrhea, or herpes 2.  Urethritis - Inflammation of the urethra, often due to infection or irritation.  Bladder or Kidney Infections - Can cause more severe symptoms, including fever and back pain 2.  Vaginitis - Inflammation of the vagina, often due to yeast infections, bacterial vaginosis, or trichomoniasis.  Prostatitis - Inflammation of the prostate in men, often causing pelvic pain and urinary symptoms.  Irritants - Such as soaps, bubble baths, or spermicides.  Kidney stones - Can cause sharp pain and blood in the urine 3.    ?? Symptoms  Burning or stinging during urination  Pain at the start or end of urination  Frequent urge to urinate  Cloudy or foul-smelling urine  Lower abdominal or pelvic pain  Blood in the urine (hematuria)    ?? Diagnosis  A healthcare provider may:  Review your medical and sexual history  Perform a physical exam  Order a urinalysis or urine culture  Test for STIs  Conduct imaging (e.g., ultrasound) if needed 1    ?? Treatment  Depends on the cause:  Antibiotics for bacterial infections (UTIs, STIs)  Antifungals for yeast infections  Avoidance of irritants  Increased fluid intake  Pain relievers (e.g., phenazopyridine) for symptom relief   Problem: Impaired respiratory status  Goal: Clear lung sounds  Description: Clear lung sounds  Outcome: Ongoing